# Patient Record
Sex: MALE | Race: WHITE | NOT HISPANIC OR LATINO | Employment: OTHER | URBAN - METROPOLITAN AREA
[De-identification: names, ages, dates, MRNs, and addresses within clinical notes are randomized per-mention and may not be internally consistent; named-entity substitution may affect disease eponyms.]

---

## 2017-08-09 ENCOUNTER — ALLSCRIPTS OFFICE VISIT (OUTPATIENT)
Dept: OTHER | Facility: OTHER | Age: 62
End: 2017-08-09

## 2017-09-26 RX ORDER — ESOMEPRAZOLE MAGNESIUM 40 MG/1
40 CAPSULE, DELAYED RELEASE ORAL
COMMUNITY
End: 2018-05-02 | Stop reason: SDUPTHER

## 2017-09-26 RX ORDER — ATORVASTATIN CALCIUM 40 MG/1
40 TABLET, FILM COATED ORAL DAILY
COMMUNITY
End: 2018-04-26 | Stop reason: SDUPTHER

## 2017-09-26 RX ORDER — ASPIRIN 81 MG/1
81 TABLET ORAL DAILY
COMMUNITY
End: 2018-04-26 | Stop reason: SDUPTHER

## 2017-10-03 ENCOUNTER — ANESTHESIA EVENT (OUTPATIENT)
Dept: PERIOP | Facility: HOSPITAL | Age: 62
End: 2017-10-03
Payer: COMMERCIAL

## 2017-10-03 NOTE — ANESTHESIA PREPROCEDURE EVALUATION
Review of Systems/Medical History  Patient summary reviewed    No history of anesthetic complications     Cardiovascular  Exercise tolerance: good,  Hyperlipidemia,    Pulmonary  Negative pulmonary ROS ,        GI/Hepatic    GERD ,        Negative  ROS        Endo/Other  Negative endo/other ROS      GYN  Negative gynecology ROS          Hematology  Negative hematology ROS      Musculoskeletal  Negative musculoskeletal ROS        Neurology  Negative neurology ROS      Psychology   Negative psychology ROS        finished prep last night @ 1900    Physical Exam    Airway    Mallampati score: II  TM Distance: >3 FB  Neck ROM: full     Dental   No notable dental hx     Cardiovascular  Cardiovascular exam normal    Pulmonary  Pulmonary exam normal     Other Findings        Anesthesia Plan  ASA Score- 2       Anesthesia Type- IV sedation with anesthesia        Induction- intravenous      Informed Consent  Anesthetic plan and risks discussed with patient

## 2017-10-04 ENCOUNTER — GENERIC CONVERSION - ENCOUNTER (OUTPATIENT)
Dept: OTHER | Facility: OTHER | Age: 62
End: 2017-10-04

## 2017-10-04 ENCOUNTER — HOSPITAL ENCOUNTER (OUTPATIENT)
Facility: HOSPITAL | Age: 62
Setting detail: OUTPATIENT SURGERY
Discharge: HOME | End: 2017-10-04
Attending: INTERNAL MEDICINE | Admitting: INTERNAL MEDICINE
Payer: COMMERCIAL

## 2017-10-04 ENCOUNTER — ANESTHESIA (OUTPATIENT)
Dept: PERIOP | Facility: HOSPITAL | Age: 62
End: 2017-10-04
Payer: COMMERCIAL

## 2017-10-04 VITALS
TEMPERATURE: 97.8 F | BODY MASS INDEX: 30.49 KG/M2 | WEIGHT: 212.96 LBS | SYSTOLIC BLOOD PRESSURE: 139 MMHG | HEART RATE: 77 BPM | RESPIRATION RATE: 20 BRPM | OXYGEN SATURATION: 96 % | HEIGHT: 70 IN | DIASTOLIC BLOOD PRESSURE: 63 MMHG

## 2017-10-04 DIAGNOSIS — Z12.11 ENCOUNTER FOR SCREENING FOR MALIGNANT NEOPLASM OF COLON: ICD-10-CM

## 2017-10-04 PROCEDURE — 88305 TISSUE EXAM BY PATHOLOGIST: CPT | Performed by: INTERNAL MEDICINE

## 2017-10-04 RX ORDER — PROPOFOL 10 MG/ML
INJECTION, EMULSION INTRAVENOUS AS NEEDED
Status: DISCONTINUED | OUTPATIENT
Start: 2017-10-04 | End: 2017-10-04 | Stop reason: SURG

## 2017-10-04 RX ORDER — SODIUM CHLORIDE, SODIUM LACTATE, POTASSIUM CHLORIDE, CALCIUM CHLORIDE 600; 310; 30; 20 MG/100ML; MG/100ML; MG/100ML; MG/100ML
125 INJECTION, SOLUTION INTRAVENOUS CONTINUOUS
Status: DISCONTINUED | OUTPATIENT
Start: 2017-10-04 | End: 2017-10-04 | Stop reason: HOSPADM

## 2017-10-04 RX ADMIN — SODIUM CHLORIDE, POTASSIUM CHLORIDE, SODIUM LACTATE AND CALCIUM CHLORIDE: 600; 310; 30; 20 INJECTION, SOLUTION INTRAVENOUS at 08:10

## 2017-10-04 RX ADMIN — PROPOFOL 30 MG: 10 INJECTION, EMULSION INTRAVENOUS at 08:20

## 2017-10-04 RX ADMIN — SODIUM CHLORIDE, POTASSIUM CHLORIDE, SODIUM LACTATE AND CALCIUM CHLORIDE 125 ML/HR: 600; 310; 30; 20 INJECTION, SOLUTION INTRAVENOUS at 07:23

## 2017-10-04 RX ADMIN — PROPOFOL 140 MG: 10 INJECTION, EMULSION INTRAVENOUS at 08:15

## 2017-10-04 NOTE — OP NOTE
**** GI/ENDOSCOPY REPORT ****     PATIENT NAME: Micky Quick ------ VISIT ID:  Patient ID:   [id number] YOB: 1955     INTRODUCTION: Colonoscopy - A 63 male patient presents for an outpatient   Colonoscopy at [facility]  PREVIOUS COLONOSCOPY: 7 yrs     INDICATIONS: Surveillance  Screening for family history of colorectal   cancer, including the patient's mother (diagnosed at age 61)  CONSENT:  The benefits, risks, and alternatives to the procedure were   discussed and informed consent was obtained from the patient  PREPARATION: EKG, pulse, pulse oximetry and blood pressure were monitored   throughout the procedure  The patient was identified by myself both   verbally and by visual inspection of ID band  Airway Assessment   Classification: Airway class 2 - Visualization of the soft palate, fauces   and uvula  ASA Classification: Class 2 - Patient has mild to moderate   systemic disturbance that may or may not be related to the disorder   requiring surgery  MEDICATIONS: Anesthesia-check records     PROCEDURE:  The endoscope was passed without difficulty through the anus   under direct visualization and advanced to the cecum, confirmed by   appendiceal orifice and ileocecal valve  The scope was withdrawn and the   mucosa was carefully examined  The quality of the preparation was   excellent  Cecal Intubation Time: 2 minutes(s) Scope Withdrawal Time: 7   minutes(s)     RECTAL EXAM: Normal rectal exam      FINDINGS:  A single polyp, measuring less than 5 mm in size, was found in   the hepatic flexure  The polyp was completely removed by hot biopsy   polypectomy  The polyp was retrieved  A single diminutive polyp was found   in the rectosigmoid junction  The polyp was completely removed by hot   biopsy polypectomy  The polyp was retrieved  Otherwise, the colon appeared   to be normal      COMPLICATIONS: There were no complications       IMPRESSIONS: A single polyp found in the hepatic flexure; removed by hot   biopsy polypectomy  A single diminutive polyp found in the rectosigmoid   junction; removed by hot biopsy polypectomy  RECOMMENDATIONS: Follow-up on the results of the biopsy specimens  Colonoscopy recommended in 5 years  ESTIMATED BLOOD LOSS: None  PATHOLOGY SPECIMENS: Single polyp completely removed by hot biopsy   polypectomy   Single polyp completely removed by hot biopsy polypectomy   PROCEDURE CODES: : Colonoscopy with removal of tumor(s), polyp(s), or   other lesion(s) by hot biopsy forceps or bipolar cautery     ICD-9 Codes: V16 0 Family history of malignant neoplasm of   gastrointestinal tract 211 3 Benign neoplasm of colon 211 3 Benign   neoplasm of colon     ICD-10 Codes: Z80 0 Family history of malignant neoplasm of digestive   organs K63 5 Polyp of colon K63 5 Polyp of colon     PERFORMED BY: Dr Katie Castleman, M D  [facility]  on 10/04/2017  Version 1, electronically signed by MELINDA Prieto , D O  on   10/04/2017 at 08:38

## 2017-10-04 NOTE — ANESTHESIA POSTPROCEDURE EVALUATION
Post-Op Assessment Note      CV Status:  Stable    Mental Status:  Alert and awake    Hydration Status:  Euvolemic    PONV Controlled:  Controlled    Airway Patency:  Patent    Post Op Vitals Reviewed: Yes          Staff: CRNA           /62 (10/04/17 0828)    Temp      Pulse 69 (10/04/17 0828)   Resp 18 (10/04/17 0828)    SpO2 100 % (10/04/17 0828)

## 2018-01-15 VITALS
SYSTOLIC BLOOD PRESSURE: 118 MMHG | HEART RATE: 76 BPM | BODY MASS INDEX: 31.51 KG/M2 | DIASTOLIC BLOOD PRESSURE: 88 MMHG | HEIGHT: 70 IN | WEIGHT: 220.13 LBS

## 2018-01-15 DIAGNOSIS — R73.01 IMPAIRED FASTING GLUCOSE: ICD-10-CM

## 2018-01-15 DIAGNOSIS — Z12.5 ENCOUNTER FOR SCREENING FOR MALIGNANT NEOPLASM OF PROSTATE: ICD-10-CM

## 2018-01-15 DIAGNOSIS — E78.5 HYPERLIPIDEMIA: ICD-10-CM

## 2018-04-26 DIAGNOSIS — I10 ESSENTIAL HYPERTENSION: Primary | ICD-10-CM

## 2018-04-26 DIAGNOSIS — E78.5 HYPERLIPIDEMIA, UNSPECIFIED HYPERLIPIDEMIA TYPE: Primary | ICD-10-CM

## 2018-04-26 RX ORDER — ATORVASTATIN CALCIUM 40 MG/1
TABLET, FILM COATED ORAL
Qty: 90 TABLET | Refills: 0 | Status: SHIPPED | OUTPATIENT
Start: 2018-04-26 | End: 2018-05-02 | Stop reason: SDUPTHER

## 2018-04-26 RX ORDER — ASPIRIN 81 MG/1
TABLET ORAL
Qty: 90 TABLET | Refills: 2 | Status: SHIPPED | OUTPATIENT
Start: 2018-04-26 | End: 2019-04-29 | Stop reason: SDUPTHER

## 2018-05-02 ENCOUNTER — OFFICE VISIT (OUTPATIENT)
Dept: FAMILY MEDICINE CLINIC | Facility: CLINIC | Age: 63
End: 2018-05-02
Payer: COMMERCIAL

## 2018-05-02 VITALS
BODY MASS INDEX: 31.41 KG/M2 | TEMPERATURE: 96.8 F | OXYGEN SATURATION: 96 % | HEART RATE: 82 BPM | SYSTOLIC BLOOD PRESSURE: 136 MMHG | DIASTOLIC BLOOD PRESSURE: 78 MMHG | HEIGHT: 70 IN | WEIGHT: 219.4 LBS

## 2018-05-02 DIAGNOSIS — Z13.1 DIABETES MELLITUS SCREENING: ICD-10-CM

## 2018-05-02 DIAGNOSIS — R03.0 PREHYPERTENSION: ICD-10-CM

## 2018-05-02 DIAGNOSIS — Z12.5 PROSTATE CANCER SCREENING: ICD-10-CM

## 2018-05-02 DIAGNOSIS — K21.9 GASTROESOPHAGEAL REFLUX DISEASE WITHOUT ESOPHAGITIS: Primary | ICD-10-CM

## 2018-05-02 DIAGNOSIS — Z11.59 NEED FOR HEPATITIS C SCREENING TEST: ICD-10-CM

## 2018-05-02 DIAGNOSIS — E78.5 HYPERLIPIDEMIA, UNSPECIFIED HYPERLIPIDEMIA TYPE: ICD-10-CM

## 2018-05-02 PROCEDURE — 99203 OFFICE O/P NEW LOW 30 MIN: CPT | Performed by: FAMILY MEDICINE

## 2018-05-02 RX ORDER — ESOMEPRAZOLE MAGNESIUM 40 MG/1
40 CAPSULE, DELAYED RELEASE ORAL DAILY
Qty: 90 CAPSULE | Refills: 1 | Status: SHIPPED | OUTPATIENT
Start: 2018-05-02 | End: 2018-05-15 | Stop reason: ALTCHOICE

## 2018-05-02 RX ORDER — ATORVASTATIN CALCIUM 40 MG/1
40 TABLET, FILM COATED ORAL DAILY
Qty: 90 TABLET | Refills: 1 | Status: SHIPPED | OUTPATIENT
Start: 2018-05-02 | End: 2019-01-28 | Stop reason: SDUPTHER

## 2018-05-02 NOTE — PROGRESS NOTES
Assessment/Plan:    No problem-specific Assessment & Plan notes found for this encounter  Diagnoses and all orders for this visit:    Gastroesophageal reflux disease without esophagitis  Stable on chronic medications counseled in regards to side effects of prolong use of PPI  -     esomeprazole (NexIUM) 40 MG capsule; Take 1 capsule (40 mg total) by mouth daily    Hyperlipidemia, unspecified hyperlipidemia type  Stable continue Lipitor   -     atorvastatin (LIPITOR) 40 mg tablet; Take 1 tablet (40 mg total) by mouth daily  -     Lipid panel; Future    Prehypertension  He was advised to keep a low salt diet and also increase diet and exercise and lose weight to maintain normotension  Diabetes mellitus screening  -     Comprehensive metabolic panel; Future  -     HEMOGLOBIN A1C W/ EAG ESTIMATION; Future    Prostate cancer screening  -     PSA Total, Diagnostic; Future      Follow up in 6 months to 1 year or as needed    Subjective:      Patient ID: Halie Trammell is a 66 y o  male  GERD  Paitent complains of heartburn  This has been associated with heartburn  He denies abdominal bloating, chest pain, choking on food, cough, difficulty swallowing and dysphagia  Symptoms have been present for several years  He denies dysphagia  He has not lost weight  He denies melena, hematochezia, hematemesis, and coffee ground emesis  Medical therapy in the past has included proton pump inhibitors  Pre-Hypertension  Patient is here for evaluation of elevated blood pressures  Cardiac symptoms: none  Patient denies chest pain and chest pressure/discomfort  Cardiovascular risk factors: advanced age (older than 54 for men, 72 for women), male gender and obesity (BMI >= 30 kg/m2)  Use of agents associated with hypertension: none  History of target organ damage: none  Hyperlipidemia  Halie Trammell is a 66 y o  male who presents for evaluation of dyslipidemia  A repeat fasting lipid profile was not done   The patient does not use medications that may worsen dyslipidemias (corticosteroids, progestins, anabolic steroids, diuretics, beta-blockers, amiodarone, cyclosporine, olanzapine)  The following portions of the patient's history were reviewed and updated as appropriate:   He  has a past medical history of GERD (gastroesophageal reflux disease); Hyperlipidemia; and Seasonal allergies  He   Patient Active Problem List    Diagnosis Date Noted    Gastroesophageal reflux disease without esophagitis 05/02/2018    Hyperlipidemia 05/02/2018    Prehypertension 05/02/2018    Diabetes mellitus screening 05/02/2018    Prostate cancer screening 05/02/2018     He  has a past surgical history that includes Cholecystectomy; Sinus surgery; EAR SURGERY; Colonoscopy (N/A, 10/4/2017); and Cochlear implant  His family history includes Colon cancer in his mother; Heart attack in his father; Hypertension in his brother, father, and sister; Other in his mother  He  reports that he quit smoking about 43 years ago  He has never used smokeless tobacco  He reports that he drinks about 1 8 oz of alcohol per week   He reports that he does not use drugs  Current Outpatient Prescriptions   Medication Sig Dispense Refill    aspirin (ECOTRIN LOW STRENGTH) 81 mg EC tablet TAKE 1 TABLET DAILY 90 tablet 2    atorvastatin (LIPITOR) 40 mg tablet Take 1 tablet (40 mg total) by mouth daily 90 tablet 1    esomeprazole (NexIUM) 40 MG capsule Take 1 capsule (40 mg total) by mouth daily 90 capsule 1     No current facility-administered medications for this visit        Current Outpatient Prescriptions on File Prior to Visit   Medication Sig    aspirin (ECOTRIN LOW STRENGTH) 81 mg EC tablet TAKE 1 TABLET DAILY    [DISCONTINUED] atorvastatin (LIPITOR) 40 mg tablet TAKE 1 TABLET DAILY    [DISCONTINUED] esomeprazole (NexIUM) 40 MG capsule Take 40 mg by mouth every morning before breakfast     No current facility-administered medications on file prior to visit  He has No Known Allergies       Review of Systems   Constitutional: Negative for activity change, appetite change, fatigue and fever  HENT: Negative for congestion and ear discharge  Respiratory: Negative for cough and shortness of breath  Cardiovascular: Negative for chest pain and palpitations  Gastrointestinal: Negative for diarrhea and nausea  Musculoskeletal: Negative for arthralgias and back pain  Skin: Negative for color change and rash  Neurological: Negative for dizziness and headaches  Psychiatric/Behavioral: Negative for agitation and behavioral problems  Objective:      /78   Pulse 82   Temp (!) 96 8 °F (36 °C)   Ht 5' 10" (1 778 m)   Wt 99 5 kg (219 lb 6 4 oz)   SpO2 96%   BMI 31 48 kg/m²          Physical Exam   Constitutional: He is oriented to person, place, and time  He appears well-developed and well-nourished  No distress  Eyes: Pupils are equal, round, and reactive to light  No scleral icterus  Cardiovascular: Normal rate, regular rhythm and normal heart sounds  No murmur heard  Pulmonary/Chest: Effort normal and breath sounds normal  No respiratory distress  He has no wheezes  Abdominal: Soft  Bowel sounds are normal  He exhibits no distension  There is no tenderness  Neurological: He is alert and oriented to person, place, and time  Skin: Skin is warm and dry  No rash noted  He is not diaphoretic

## 2018-05-09 LAB
ALBUMIN SERPL-MCNC: 4.4 G/DL (ref 3.6–4.8)
ALBUMIN/GLOB SERPL: 2.1 {RATIO} (ref 1.2–2.2)
ALP SERPL-CCNC: 75 IU/L (ref 39–117)
ALT SERPL-CCNC: 40 IU/L (ref 0–44)
AMBIG ABBREV DEFAULT: NORMAL
AMBIG ABBREV DEFAULT: NORMAL
AST SERPL-CCNC: 26 IU/L (ref 0–40)
BILIRUB SERPL-MCNC: 0.7 MG/DL (ref 0–1.2)
BUN SERPL-MCNC: 15 MG/DL (ref 8–27)
BUN/CREAT SERPL: 13 (ref 10–24)
CALCIUM SERPL-MCNC: 9.4 MG/DL (ref 8.6–10.2)
CHLORIDE SERPL-SCNC: 104 MMOL/L (ref 96–106)
CHOLEST SERPL-MCNC: 148 MG/DL (ref 100–199)
CO2 SERPL-SCNC: 25 MMOL/L (ref 18–29)
CREAT SERPL-MCNC: 1.19 MG/DL (ref 0.76–1.27)
EST. AVERAGE GLUCOSE BLD GHB EST-MCNC: 120 MG/DL
GLOBULIN SER-MCNC: 2.1 G/DL (ref 1.5–4.5)
GLUCOSE SERPL-MCNC: 109 MG/DL (ref 65–99)
HBA1C MFR BLD HPLC: 5.8 %
HBA1C MFR BLD: 5.8 % (ref 4.8–5.6)
HCV AB S/CO SERPL IA: <0.1 S/CO RATIO (ref 0–0.9)
HDLC SERPL-MCNC: 40 MG/DL
LDLC SERPL CALC-MCNC: 89 MG/DL (ref 0–99)
POTASSIUM SERPL-SCNC: 4.8 MMOL/L (ref 3.5–5.2)
PROT SERPL-MCNC: 6.5 G/DL (ref 6–8.5)
PSA SERPL-MCNC: 2.1 NG/ML (ref 0–4)
SL AMB COMMENTS: NORMAL
SL AMB EGFR AFRICAN AMERICAN: 76 ML/MIN/1.73
SL AMB EGFR NON AFRICAN AMERICAN: 66 ML/MIN/1.73
SL AMB VLDL CHOLESTEROL CALC: 19 MG/DL (ref 5–40)
SODIUM SERPL-SCNC: 145 MMOL/L (ref 134–144)
TRIGL SERPL-MCNC: 93 MG/DL (ref 0–149)

## 2018-05-10 ENCOUNTER — OFFICE VISIT (OUTPATIENT)
Dept: FAMILY MEDICINE CLINIC | Facility: CLINIC | Age: 63
End: 2018-05-10
Payer: COMMERCIAL

## 2018-05-10 VITALS
BODY MASS INDEX: 31.12 KG/M2 | DIASTOLIC BLOOD PRESSURE: 76 MMHG | OXYGEN SATURATION: 96 % | SYSTOLIC BLOOD PRESSURE: 134 MMHG | TEMPERATURE: 97.9 F | HEIGHT: 70 IN | HEART RATE: 68 BPM | RESPIRATION RATE: 16 BRPM | WEIGHT: 217.4 LBS

## 2018-05-10 DIAGNOSIS — R10.13 EPIGASTRIC PAIN: Primary | ICD-10-CM

## 2018-05-10 PROCEDURE — 99214 OFFICE O/P EST MOD 30 MIN: CPT | Performed by: FAMILY MEDICINE

## 2018-05-10 RX ORDER — RANITIDINE 150 MG/1
150 CAPSULE ORAL 2 TIMES DAILY
Qty: 28 CAPSULE | Refills: 1 | Status: SHIPPED | OUTPATIENT
Start: 2018-05-10 | End: 2018-05-15 | Stop reason: ALTCHOICE

## 2018-05-10 NOTE — PROGRESS NOTES
Assessment/Plan:    No problem-specific Assessment & Plan notes found for this encounter  Diagnoses and all orders for this visit:    Epigastric pain  Unclear etiology, given his distant history of Ulcer and after discussing risks and benefits of medication along with side effects will start Zantac at this time to be taken along Nexium  Blood work and stool studies ordered  Also U/S of abdomen  If symptoms worsen of severe pain, hematemesis develops advised to go to the ER immediately  -     Lipase; Future  -     Amylase; Future  -     H  pylori antigen, stool; Future  -     US abdomen complete; Future  -     Comprehensive metabolic panel; Future  -     CBC and differential; Future  -     ranitidine (ZANTAC) 150 MG capsule; Take 1 capsule (150 mg total) by mouth 2 (two) times a day for 14 days      Follow up in 1 week or as needed    Subjective:      Patient ID: Karthikeyan Saeed is a 66 y o  male  Abdominal Pain  Patient complains of abdominal pain  The pain is described as sharp, shooting and stabbing, and is 6/10 in intensity  The patient is experiencing epigastric pain without radiation  Onset was 3 days ago  Symptoms have been gradually worsening  Aggravating factors: eating  Alleviating factors: none  Associated symptoms: nausea  The patient denies anorexia, arthralgias, belching, chills, diarrhea, dysuria, fever, hematochezia, hematuria and melena  He has a distant history of duodenal ulcers in the past as well as acid reflux disease and takes Nexium daily for his symptoms  The following portions of the patient's history were reviewed and updated as appropriate:   He  has a past medical history of GERD (gastroesophageal reflux disease); Hyperlipidemia; and Seasonal allergies    He   Patient Active Problem List    Diagnosis Date Noted    Epigastric pain 05/10/2018    Gastroesophageal reflux disease without esophagitis 05/02/2018    Hyperlipidemia 05/02/2018    Prehypertension 05/02/2018  Diabetes mellitus screening 05/02/2018    Prostate cancer screening 05/02/2018     He  has a past surgical history that includes Cholecystectomy; Sinus surgery; EAR SURGERY; Colonoscopy (N/A, 10/4/2017); and Cochlear implant  His family history includes Colon cancer in his mother; Heart attack in his father; Hypertension in his brother, father, and sister; Other in his mother  He  reports that he quit smoking about 43 years ago  He has never used smokeless tobacco  He reports that he drinks about 1 8 oz of alcohol per week   He reports that he does not use drugs  Current Outpatient Prescriptions   Medication Sig Dispense Refill    aspirin (ECOTRIN LOW STRENGTH) 81 mg EC tablet TAKE 1 TABLET DAILY 90 tablet 2    atorvastatin (LIPITOR) 40 mg tablet Take 1 tablet (40 mg total) by mouth daily 90 tablet 1    esomeprazole (NexIUM) 40 MG capsule Take 1 capsule (40 mg total) by mouth daily 90 capsule 1    ranitidine (ZANTAC) 150 MG capsule Take 1 capsule (150 mg total) by mouth 2 (two) times a day for 14 days 28 capsule 1     No current facility-administered medications for this visit  Current Outpatient Prescriptions on File Prior to Visit   Medication Sig    aspirin (ECOTRIN LOW STRENGTH) 81 mg EC tablet TAKE 1 TABLET DAILY    atorvastatin (LIPITOR) 40 mg tablet Take 1 tablet (40 mg total) by mouth daily    esomeprazole (NexIUM) 40 MG capsule Take 1 capsule (40 mg total) by mouth daily     No current facility-administered medications on file prior to visit  He has No Known Allergies       Review of Systems   Constitutional: Negative for activity change, appetite change, fatigue and fever  HENT: Negative for congestion and ear discharge  Respiratory: Negative for cough and shortness of breath  Cardiovascular: Negative for chest pain and palpitations  Gastrointestinal: Positive for abdominal pain and nausea   Negative for abdominal distention, blood in stool, constipation, diarrhea and vomiting  Musculoskeletal: Negative for arthralgias and back pain  Skin: Negative for color change and rash  Neurological: Negative for dizziness and headaches  Psychiatric/Behavioral: Negative for agitation and behavioral problems  Objective:      /76 (BP Location: Left arm, Patient Position: Sitting, Cuff Size: Standard)   Pulse 68   Temp 97 9 °F (36 6 °C) (Tympanic)   Resp 16   Ht 5' 10" (1 778 m)   Wt 98 6 kg (217 lb 6 4 oz)   SpO2 96%   BMI 31 19 kg/m²          Physical Exam   Constitutional: He is oriented to person, place, and time  He appears well-developed and well-nourished  No distress  Eyes: Pupils are equal, round, and reactive to light  No scleral icterus  Cardiovascular: Normal rate, regular rhythm and normal heart sounds  No murmur heard  Pulmonary/Chest: Effort normal and breath sounds normal  No respiratory distress  He has no wheezes  Abdominal: Soft  Bowel sounds are normal  He exhibits no distension  There is tenderness  Neurological: He is alert and oriented to person, place, and time  Skin: Skin is warm and dry  No rash noted  He is not diaphoretic  Psychiatric: He has a normal mood and affect

## 2018-05-11 ENCOUNTER — HOSPITAL ENCOUNTER (OUTPATIENT)
Dept: RADIOLOGY | Age: 63
Discharge: HOME | End: 2018-05-11
Payer: COMMERCIAL

## 2018-05-11 DIAGNOSIS — R10.13 EPIGASTRIC PAIN: ICD-10-CM

## 2018-05-11 PROCEDURE — 76700 US EXAM ABDOM COMPLETE: CPT

## 2018-05-15 ENCOUNTER — OFFICE VISIT (OUTPATIENT)
Dept: GASTROENTEROLOGY | Facility: CLINIC | Age: 63
End: 2018-05-15
Payer: COMMERCIAL

## 2018-05-15 VITALS
HEIGHT: 70 IN | HEART RATE: 84 BPM | WEIGHT: 212 LBS | BODY MASS INDEX: 30.35 KG/M2 | DIASTOLIC BLOOD PRESSURE: 70 MMHG | SYSTOLIC BLOOD PRESSURE: 128 MMHG

## 2018-05-15 DIAGNOSIS — R10.30 LOWER ABDOMINAL PAIN: Primary | ICD-10-CM

## 2018-05-15 DIAGNOSIS — R10.13 DYSPEPSIA: ICD-10-CM

## 2018-05-15 DIAGNOSIS — R10.13 EPIGASTRIC PAIN: Primary | ICD-10-CM

## 2018-05-15 DIAGNOSIS — R17 SERUM TOTAL BILIRUBIN ELEVATED: ICD-10-CM

## 2018-05-15 LAB
ALBUMIN SERPL-MCNC: 4.8 G/DL (ref 3.6–4.8)
ALBUMIN/GLOB SERPL: 2.1 {RATIO} (ref 1.2–2.2)
ALP SERPL-CCNC: 89 IU/L (ref 39–117)
ALT SERPL-CCNC: 37 IU/L (ref 0–44)
AMBIG ABBREV DEFAULT: NORMAL
AMYLASE SERPL-CCNC: 64 U/L (ref 31–124)
AST SERPL-CCNC: 24 IU/L (ref 0–40)
BASOPHILS # BLD AUTO: 0 X10E3/UL (ref 0–0.2)
BASOPHILS NFR BLD AUTO: 0 %
BILIRUB SERPL-MCNC: 1.3 MG/DL (ref 0–1.2)
BUN SERPL-MCNC: 13 MG/DL (ref 8–27)
BUN/CREAT SERPL: 12 (ref 10–24)
CALCIUM SERPL-MCNC: 9.3 MG/DL (ref 8.6–10.2)
CHLORIDE SERPL-SCNC: 97 MMOL/L (ref 96–106)
CO2 SERPL-SCNC: 26 MMOL/L (ref 18–29)
CREAT SERPL-MCNC: 1.13 MG/DL (ref 0.76–1.27)
EOSINOPHIL # BLD AUTO: 0.4 X10E3/UL (ref 0–0.4)
EOSINOPHIL NFR BLD AUTO: 3 %
ERYTHROCYTE [DISTWIDTH] IN BLOOD BY AUTOMATED COUNT: 13.8 % (ref 12.3–15.4)
GLOBULIN SER-MCNC: 2.3 G/DL (ref 1.5–4.5)
GLUCOSE SERPL-MCNC: 116 MG/DL (ref 65–99)
H PYLORI AG STL QL IA: NEGATIVE
HCT VFR BLD AUTO: 51.8 % (ref 37.5–51)
HGB BLD-MCNC: 17.3 G/DL (ref 13–17.7)
IMM GRANULOCYTES # BLD: 0 X10E3/UL (ref 0–0.1)
IMM GRANULOCYTES NFR BLD: 0 %
LIPASE SERPL-CCNC: 35 U/L (ref 13–78)
LYMPHOCYTES # BLD AUTO: 2.5 X10E3/UL (ref 0.7–3.1)
LYMPHOCYTES NFR BLD AUTO: 23 %
MCH RBC QN AUTO: 30 PG (ref 26.6–33)
MCHC RBC AUTO-ENTMCNC: 33.4 G/DL (ref 31.5–35.7)
MCV RBC AUTO: 90 FL (ref 79–97)
MONOCYTES # BLD AUTO: 1.2 X10E3/UL (ref 0.1–0.9)
MONOCYTES NFR BLD AUTO: 11 %
NEUTROPHILS # BLD AUTO: 6.7 X10E3/UL (ref 1.4–7)
NEUTROPHILS NFR BLD AUTO: 63 %
PLATELET # BLD AUTO: 277 X10E3/UL (ref 150–379)
POTASSIUM SERPL-SCNC: 4.1 MMOL/L (ref 3.5–5.2)
PROT SERPL-MCNC: 7.1 G/DL (ref 6–8.5)
RBC # BLD AUTO: 5.76 X10E6/UL (ref 4.14–5.8)
SL AMB EGFR AFRICAN AMERICAN: 81 ML/MIN/1.73
SL AMB EGFR NON AFRICAN AMERICAN: 70 ML/MIN/1.73
SODIUM SERPL-SCNC: 139 MMOL/L (ref 134–144)
WBC # BLD AUTO: 10.9 X10E3/UL (ref 3.4–10.8)

## 2018-05-15 PROCEDURE — 99214 OFFICE O/P EST MOD 30 MIN: CPT | Performed by: NURSE PRACTITIONER

## 2018-05-15 RX ORDER — DEXLANSOPRAZOLE 60 MG/1
60 CAPSULE, DELAYED RELEASE ORAL DAILY
Qty: 39 CAPSULE | Refills: 6 | Status: SHIPPED | OUTPATIENT
Start: 2018-05-15 | End: 2018-05-31 | Stop reason: SDUPTHER

## 2018-05-15 RX ORDER — DICYCLOMINE HCL 20 MG
20 TABLET ORAL EVERY 6 HOURS
Qty: 30 TABLET | Refills: 0 | Status: SHIPPED | OUTPATIENT
Start: 2018-05-15 | End: 2019-04-15 | Stop reason: ALTCHOICE

## 2018-05-15 RX ORDER — FAMOTIDINE 40 MG/1
40 TABLET, FILM COATED ORAL DAILY
Qty: 30 TABLET | Refills: 6 | Status: SHIPPED | OUTPATIENT
Start: 2018-05-15 | End: 2018-05-31 | Stop reason: SDUPTHER

## 2018-05-15 NOTE — PATIENT INSTRUCTIONS
Patient with distant history of peptic ulcer and has been on  PPI therapy for 20 + years    He has tried omeprazole and failed, pantoprazole and failed and esomeprazole and failed  He has failed ranitidine and has not trialed pepcid  Elevated bilirubin and dyspepsia- Recent ultrasound showed a gallbladder which he had removed many years ago and so we will repeat  Continue probiotic  He leaves for family vacation for 1 week to [place] on Saturday and will obtain EGD afterwards  GERD diet instructions given

## 2018-05-15 NOTE — PROGRESS NOTES
Assessment/Plan:    Patient with distant history of peptic ulcer and has been on  PPI therapy for 20 + years  He has tried omeprazole and failed, pantoprazole and failed and esomeprazole and failed  He has failed ranitidine and has not trialed pepcid    Elevated bilirubin and dyspepsia- Recent ultrasound showed a gallbladder which he had removed many years ago and so we will repeat ultrasound    Continue probiotic    He leaves for family vacation for 1 week to [place] on Saturday and will obtain EGD afterwards    GERD diet instructions given           Problem List Items Addressed This Visit     Epigastric pain - Primary    Relevant Medications    dexlansoprazole (DEXILANT) 60 MG capsule    famotidine (PEPCID) 40 MG tablet    aluminum hydroxide-magnesium carbonate (GAVISCON)  mg/15 mL oral suspension    Other Relevant Orders    US abdomen complete    Dyspepsia    Relevant Medications    dexlansoprazole (DEXILANT) 60 MG capsule    famotidine (PEPCID) 40 MG tablet    aluminum hydroxide-magnesium carbonate (GAVISCON)  mg/15 mL oral suspension    Other Relevant Orders    US abdomen complete    Serum total bilirubin elevated    Relevant Orders    US abdomen complete            Subjective:      Patient ID: Jaun Delgado is a 66 y o  male  68-year-old male With past medical history of peptic ulcer disease diagnosed when patient was 15, chronic GERD and PPI usage for the past 25 years  He has been on omeprazole, pantoprazole, ranitidine  He has never been on Dexilant  2 weeks patient ago was doing intensive Labor outside during a warm day until he came in for dinner  His dinner consisted of stuffed shells, sausage, meatballs, bread and butter  He then developed epigastric pain, nausea, heartburn, abdominal bloating  He vomited during that time but not coffee-ground  He had taken over-the-counter antacids and Pepto-Bismol without relief   Patient then developed this pain postprandially over the next few days although not as bad as the initial pain and saw his PCP who started him on ranitidine and did an abdominal ultrasound  The ultrasound showed a normal gallbladder but patient had gallbladder removed a few years ago for stones  He also had bloodwork which showed a Mildly elevated WBC of 10 9 with a normal H&H,  and a bilirubin total of 1 3 with normal AST and AST  His amylase and lipase were normal    The patient states that he has a lingering right upper quadrant/epigastric discomfort minutes worsens with food to the point where he has lost 8 pounds in the past 2 weeks  He continues to have some mild nausea throughout the day and in the morning, heartburn and epigastric bloating  He has no diarrhea, constipation, melena, hematochezia  His last colonoscopy was in October 2017 and he had a small benign polyp  He goes to [place] on Saturday for a one-week vacation  He will get an EGD when he comes back and I will repeat the ultrasound before he goes        The following portions of the patient's history were reviewed and updated as appropriate:   He  has a past medical history of GERD (gastroesophageal reflux disease); Hyperlipidemia; and Seasonal allergies  He   Patient Active Problem List    Diagnosis Date Noted    Dyspepsia 05/15/2018    Serum total bilirubin elevated 05/15/2018    Epigastric pain 05/10/2018    Gastroesophageal reflux disease without esophagitis 05/02/2018    Hyperlipidemia 05/02/2018    Prehypertension 05/02/2018    Diabetes mellitus screening 05/02/2018    Prostate cancer screening 05/02/2018     He  has a past surgical history that includes Cholecystectomy; Sinus surgery; EAR SURGERY; Colonoscopy (N/A, 10/4/2017); and Cochlear implant  His family history includes Colon cancer in his mother; Heart attack in his father; Hypertension in his brother, father, and sister; Other in his mother  He  reports that he quit smoking about 43 years ago   He has never used smokeless tobacco  He reports that he drinks about 1 8 oz of alcohol per week   He reports that he does not use drugs  Current Outpatient Prescriptions   Medication Sig Dispense Refill    aspirin (ECOTRIN LOW STRENGTH) 81 mg EC tablet TAKE 1 TABLET DAILY 90 tablet 2    atorvastatin (LIPITOR) 40 mg tablet Take 1 tablet (40 mg total) by mouth daily 90 tablet 1    aluminum hydroxide-magnesium carbonate (GAVISCON)  mg/15 mL oral suspension Take 15 mL by mouth 4 (four) times daily (after meals and at bedtime) 1 Bottle 0    dexlansoprazole (DEXILANT) 60 MG capsule Take 1 capsule (60 mg total) by mouth daily 39 capsule 6    famotidine (PEPCID) 40 MG tablet Take 1 tablet (40 mg total) by mouth daily for 30 days 30 tablet 6     No current facility-administered medications for this visit  Current Outpatient Prescriptions on File Prior to Visit   Medication Sig    aspirin (ECOTRIN LOW STRENGTH) 81 mg EC tablet TAKE 1 TABLET DAILY    atorvastatin (LIPITOR) 40 mg tablet Take 1 tablet (40 mg total) by mouth daily    [DISCONTINUED] esomeprazole (NexIUM) 40 MG capsule Take 1 capsule (40 mg total) by mouth daily    [DISCONTINUED] ranitidine (ZANTAC) 150 MG capsule Take 1 capsule (150 mg total) by mouth 2 (two) times a day for 14 days     No current facility-administered medications on file prior to visit  He has No Known Allergies       Review of Systems   Constitutional: Positive for appetite change and unexpected weight change  Negative for activity change, chills, diaphoresis, fatigue and fever  HENT: Negative for trouble swallowing and voice change  Respiratory: Negative  Cardiovascular: Negative  Gastrointestinal: Positive for abdominal distention, abdominal pain and nausea  Negative for anal bleeding, blood in stool, constipation, diarrhea, rectal pain and vomiting  Musculoskeletal: Negative  Skin: Positive for rash           Objective:      /70   Pulse 84   Ht 5' 10" (1 778 m)   Wt 96 2 kg (212 lb)   BMI 30 42 kg/m²          Physical Exam   Constitutional: He appears well-developed and well-nourished  Eyes: No scleral icterus  Abdominal: He exhibits distension  He exhibits no mass  There is tenderness  There is no rebound and no guarding  Gallbladder surgical scar on abdomen   Lymphadenopathy:     He has no cervical adenopathy  Skin: Skin is warm and dry

## 2018-05-18 ENCOUNTER — TELEPHONE (OUTPATIENT)
Dept: GASTROENTEROLOGY | Facility: CLINIC | Age: 63
End: 2018-05-18

## 2018-05-18 ENCOUNTER — HOSPITAL ENCOUNTER (OUTPATIENT)
Dept: ULTRASOUND IMAGING | Facility: HOSPITAL | Age: 63
Discharge: HOME | End: 2018-05-18

## 2018-05-18 DIAGNOSIS — R10.13 EPIGASTRIC PAIN: ICD-10-CM

## 2018-05-18 DIAGNOSIS — R17 SERUM TOTAL BILIRUBIN ELEVATED: ICD-10-CM

## 2018-05-18 DIAGNOSIS — R10.13 DYSPEPSIA: ICD-10-CM

## 2018-05-30 ENCOUNTER — ANESTHESIA EVENT (OUTPATIENT)
Dept: PERIOP | Facility: HOSPITAL | Age: 63
End: 2018-05-30
Payer: COMMERCIAL

## 2018-05-31 ENCOUNTER — ANESTHESIA (OUTPATIENT)
Dept: PERIOP | Facility: HOSPITAL | Age: 63
End: 2018-05-31
Payer: COMMERCIAL

## 2018-05-31 ENCOUNTER — HOSPITAL ENCOUNTER (OUTPATIENT)
Facility: HOSPITAL | Age: 63
Setting detail: OUTPATIENT SURGERY
Discharge: HOME | End: 2018-05-31
Attending: INTERNAL MEDICINE | Admitting: INTERNAL MEDICINE
Payer: COMMERCIAL

## 2018-05-31 VITALS
DIASTOLIC BLOOD PRESSURE: 73 MMHG | TEMPERATURE: 97.6 F | SYSTOLIC BLOOD PRESSURE: 120 MMHG | WEIGHT: 213 LBS | HEART RATE: 71 BPM | OXYGEN SATURATION: 96 % | BODY MASS INDEX: 30.49 KG/M2 | RESPIRATION RATE: 13 BRPM | HEIGHT: 70 IN

## 2018-05-31 DIAGNOSIS — R10.13 EPIGASTRIC PAIN: ICD-10-CM

## 2018-05-31 DIAGNOSIS — R10.13 DYSPEPSIA: ICD-10-CM

## 2018-05-31 DIAGNOSIS — R10.30 LOWER ABDOMINAL PAIN: ICD-10-CM

## 2018-05-31 PROCEDURE — 88305 TISSUE EXAM BY PATHOLOGIST: CPT | Performed by: PATHOLOGY

## 2018-05-31 PROCEDURE — 43239 EGD BIOPSY SINGLE/MULTIPLE: CPT | Performed by: INTERNAL MEDICINE

## 2018-05-31 PROCEDURE — 88342 IMHCHEM/IMCYTCHM 1ST ANTB: CPT | Performed by: PATHOLOGY

## 2018-05-31 RX ORDER — PROPOFOL 10 MG/ML
INJECTION, EMULSION INTRAVENOUS AS NEEDED
Status: DISCONTINUED | OUTPATIENT
Start: 2018-05-31 | End: 2018-05-31 | Stop reason: SURG

## 2018-05-31 RX ORDER — SODIUM CHLORIDE, SODIUM LACTATE, POTASSIUM CHLORIDE, CALCIUM CHLORIDE 600; 310; 30; 20 MG/100ML; MG/100ML; MG/100ML; MG/100ML
125 INJECTION, SOLUTION INTRAVENOUS CONTINUOUS
Status: DISCONTINUED | OUTPATIENT
Start: 2018-05-31 | End: 2018-05-31 | Stop reason: HOSPADM

## 2018-05-31 RX ORDER — DEXLANSOPRAZOLE 60 MG/1
60 CAPSULE, DELAYED RELEASE ORAL DAILY
Qty: 90 CAPSULE | Refills: 3 | Status: SHIPPED | OUTPATIENT
Start: 2018-05-31 | End: 2019-03-29 | Stop reason: SDUPTHER

## 2018-05-31 RX ORDER — LIDOCAINE HYDROCHLORIDE 10 MG/ML
INJECTION, SOLUTION INFILTRATION; PERINEURAL AS NEEDED
Status: DISCONTINUED | OUTPATIENT
Start: 2018-05-31 | End: 2018-05-31 | Stop reason: SURG

## 2018-05-31 RX ORDER — FAMOTIDINE 40 MG/1
40 TABLET, FILM COATED ORAL DAILY
Qty: 90 TABLET | Refills: 3 | Status: SHIPPED | OUTPATIENT
Start: 2018-05-31 | End: 2019-04-15 | Stop reason: ALTCHOICE

## 2018-05-31 RX ADMIN — LIDOCAINE HYDROCHLORIDE 100 MG: 10 INJECTION, SOLUTION INFILTRATION; PERINEURAL at 09:07

## 2018-05-31 RX ADMIN — PROPOFOL 20 MG: 10 INJECTION, EMULSION INTRAVENOUS at 09:08

## 2018-05-31 RX ADMIN — PROPOFOL 100 MG: 10 INJECTION, EMULSION INTRAVENOUS at 09:07

## 2018-05-31 RX ADMIN — SODIUM CHLORIDE, SODIUM LACTATE, POTASSIUM CHLORIDE, AND CALCIUM CHLORIDE 125 ML/HR: .6; .31; .03; .02 INJECTION, SOLUTION INTRAVENOUS at 08:15

## 2018-05-31 RX ADMIN — PROPOFOL 30 MG: 10 INJECTION, EMULSION INTRAVENOUS at 09:10

## 2018-05-31 NOTE — OP NOTE
**** GI/ENDOSCOPY REPORT ****     PATIENT NAME: Roxanne Lake - VISIT ID:  Patient ID: [id number]   YOB: 1955     INTRODUCTION: Esophagogastroduodenoscopy - A 66 male patient presents for   an outpatient Esophagogastroduodenoscopy at [facility]  INDICATIONS: Pain located in the epigastrium  Dyspepsia  Hx  PUD     CONSENT: The benefits, risks, and alternatives to the procedure were   discussed and informed consent was obtained from the patient  PREPARATION:  EKG, pulse, pulse oximetry and blood pressure were monitored   throughout the procedure  MEDICATIONS:asa 2     PROCEDURE:  The endoscope was passed without difficulty through the mouth   under direct visualization and advanced to the 3rd portion of the   duodenum  The scope was withdrawn and the mucosa was carefully examined  FINDINGS:   Esophagus: The esophagus appeared to be normal  The Z line was   visualized at 41 cm from the entry site  Stomach: Multiple small polyps   was found in the antrum and fundus  A biopsy was taken from the polyps in   the antrum and fundus  The body of the stomach, cardia, incisura, and   pylorus appeared to be normal  A biopsy was taken from the antrum and body   of the stomach  Duodenum: The duodenal bulb, 2nd portion of the duodenum,   and 3rd portion of the duodenum appeared to be normal      COMPLICATIONS: There were no complications  IMPRESSIONS: Normal esophagus  Z line visualized  Polyps found in the   antrum and fundus  Biopsy taken  Normal body of the stomach, cardia,   incisura, and pylorus  Biopsy taken  Normal duodenal bulb, 2nd portion of   the duodenum, and 3rd portion of the duodenum  RECOMMENDATIONS: Follow-up on the results of the biopsy specimens in 1   week  Continue current medications  ESTIMATED BLOOD LOSS: Insignificant  PATHOLOGY SPECIMENS: Biopsy taken from antrum and fundus  Associated   finding: Polyp   Random biopsy taken from the body of the stomach  PROCEDURE CODES: 40313 - EGD flexible; with biopsy     ICD-9 Codes: 789 06 Abdominal pain, epigastric 536 8 Dyspepsia and other   specified disorders of function of stomach 211 1 Benign neoplasm of stomach     ICD-10 Codes: R10 13 Epigastric pain K30 Functional dyspepsia D37 1   Neoplasm of uncertain behavior of stomach     PERFORMED BY: MELINDA Ferrera  on 05/31/2018  Version 1, electronically signed by MELINDA Portillo , D O  on   05/31/2018 at 09:16

## 2018-05-31 NOTE — ANESTHESIA POSTPROCEDURE EVALUATION
Post-Op Assessment Note      CV Status:  Stable    Mental Status:  Lethargic    Hydration Status:  Stable and euvolemic    PONV Controlled:  None    Airway Patency:  Patent    Post Op Vitals Reviewed: Yes          Staff: CRNA       Comments: vss          BP   118/73   Temp      Pulse  74   Resp 18   SpO2 95

## 2018-05-31 NOTE — DISCHARGE INSTRUCTIONS
Upper Endoscopy   WHAT YOU NEED TO KNOW:   An upper endoscopy is also called an upper gastrointestinal (GI) endoscopy, or an esophagogastroduodenoscopy (EGD)  You may feel bloated, gassy, or have some abdominal discomfort after your procedure  Your throat may be sore for 24 to 36 hours  You may burp or pass gas from air that is still inside your body  DISCHARGE INSTRUCTIONS:   Call 911 if:   · You have sudden chest pain or trouble breathing  Seek care immediately if:   · You feel dizzy or faint  · You have trouble swallowing  · You have severe throat pain  · Your bowel movements are very dark or black  · Your abdomen is hard and firm and you have severe pain  · You vomit blood  Contact your healthcare provider if:   · You feel full or bloated and cannot burp or pass gas  · You have not had a bowel movement for 3 days after your procedure  · You have neck pain  · You have a fever or chills  · You have nausea or are vomiting  · You have a rash or hives  · You have questions or concerns about your endoscopy  Relieve a sore throat:  Suck on throat lozenges or crushed ice  Gargle with a small amount of warm salt water  Mix 1 teaspoon of salt and 1 cup of warm water to make salt water  Relieve gas and discomfort from bloating:  Lie on your right side with a heating pad on your abdomen  Take short walks to help pass gas  Eat small meals until bloating is relieved  Rest after your procedure:  Do not drive or make important decisions until the day after your procedure  Return to your normal activity as directed  You can usually return to work the day after your procedure  Follow up with your healthcare provider as directed:  Write down your questions so you remember to ask them during your visits  © 2017 [employer]  Information is for End User's use only and may not be sold, redistributed or otherwise used for commercial purposes   All illustrations and images included in [employer] are the copyrighted property of [employer] , [employer]  or [employer]  The above information is an  only  It is not intended as medical advice for individual conditions or treatments  Talk to your doctor, nurse or pharmacist before following any medical regimen to see if it is safe and effective for you

## 2018-05-31 NOTE — ANESTHESIA PREPROCEDURE EVALUATION
Review of Systems/Medical History  Patient summary reviewed        Cardiovascular  Hyperlipidemia,    Pulmonary  Negative pulmonary ROS        GI/Hepatic    GERD ,        Negative  ROS        Endo/Other  Negative endo/other ROS      GYN  Negative gynecology ROS          Hematology  Negative hematology ROS      Musculoskeletal  Negative musculoskeletal ROS        Neurology  Negative neurology ROS      Psychology   Negative psychology ROS              Physical Exam    Airway    Mallampati score: II  TM Distance: >3 FB  Neck ROM: full     Dental       Cardiovascular  Cardiovascular exam normal    Pulmonary  Pulmonary exam normal     Other Findings        Anesthesia Plan  ASA Score- 2     Anesthesia Type- IV sedation with anesthesia with ASA Monitors  Additional Monitors:   Airway Plan:         Plan Factors-    Induction- intravenous  Postoperative Plan-     Informed Consent- Anesthetic plan and risks discussed with patient  I personally reviewed this patient with the CRNA  Discussed and agreed on the Anesthesia Plan with the CRNA  Jigna Wilburn

## 2018-06-07 ENCOUNTER — TELEPHONE (OUTPATIENT)
Dept: GASTROENTEROLOGY | Facility: CLINIC | Age: 63
End: 2018-06-07

## 2018-09-07 ENCOUNTER — TELEPHONE (OUTPATIENT)
Dept: GASTROENTEROLOGY | Facility: CLINIC | Age: 63
End: 2018-09-07

## 2018-12-04 ENCOUNTER — OFFICE VISIT (OUTPATIENT)
Dept: FAMILY MEDICINE CLINIC | Facility: CLINIC | Age: 63
End: 2018-12-04
Payer: COMMERCIAL

## 2018-12-04 VITALS
HEART RATE: 82 BPM | DIASTOLIC BLOOD PRESSURE: 80 MMHG | OXYGEN SATURATION: 99 % | TEMPERATURE: 98.9 F | SYSTOLIC BLOOD PRESSURE: 138 MMHG | BODY MASS INDEX: 31.92 KG/M2 | WEIGHT: 223 LBS | HEIGHT: 70 IN | RESPIRATION RATE: 18 BRPM

## 2018-12-04 DIAGNOSIS — M54.6 ACUTE RIGHT-SIDED THORACIC BACK PAIN: Primary | ICD-10-CM

## 2018-12-04 PROCEDURE — 3008F BODY MASS INDEX DOCD: CPT | Performed by: FAMILY MEDICINE

## 2018-12-04 PROCEDURE — 99214 OFFICE O/P EST MOD 30 MIN: CPT | Performed by: FAMILY MEDICINE

## 2018-12-04 RX ORDER — TIZANIDINE 4 MG/1
4 TABLET ORAL
Qty: 30 TABLET | Refills: 1 | Status: SHIPPED | OUTPATIENT
Start: 2018-12-04 | End: 2019-04-15 | Stop reason: ALTCHOICE

## 2018-12-04 RX ORDER — IBUPROFEN 600 MG/1
600 TABLET ORAL EVERY 8 HOURS PRN
Qty: 30 TABLET | Refills: 1 | Status: SHIPPED | OUTPATIENT
Start: 2018-12-04 | End: 2019-04-15

## 2018-12-04 NOTE — PROGRESS NOTES
Assessment/Plan:    No problem-specific Assessment & Plan notes found for this encounter  Diagnoses and all orders for this visit:    Acute right-sided thoracic back pain  After discussing risks and benefits of medication along with side effects  Advised to start ibuprofen 600 mg 3 times daily  Along with muscle relaxer   -     ibuprofen (MOTRIN) 600 mg tablet; Take 1 tablet (600 mg total) by mouth every 8 (eight) hours as needed for mild pain or moderate pain  -     tiZANidine (ZANAFLEX) 4 mg tablet; Take 1 tablet (4 mg total) by mouth daily at bedtime      Follow up in 1 month    Subjective:      Patient ID: Robin Isaac is a 66 y o  male  Back Pain  Patient presents for evaluation of low back problems  Symptoms have been present for a few weeks and include pain in right mid back (sharp, shooting and stabbing in character; 10/10 in severity)  Initial inciting event: he was shoveling snow 3 weeks ago  Symptoms are worse in the morning, in the middle of the day and in the afternoon  Alleviating factors, none  Aggravating factors identifiable by the patient are bending backwards, bending forwards, bending sideways, standing and walking  Treatments initiated by the patient: ibuprofen OTC  Previous lower back problems: none  Previous work up: none  The following portions of the patient's history were reviewed and updated as appropriate:   He  has a past medical history of GERD (gastroesophageal reflux disease); Hyperlipidemia; and Seasonal allergies    He   Patient Active Problem List    Diagnosis Date Noted    Acute right-sided thoracic back pain 12/04/2018    Dyspepsia 05/15/2018    Serum total bilirubin elevated 05/15/2018    Epigastric pain 05/10/2018    Gastroesophageal reflux disease without esophagitis 05/02/2018    Hyperlipidemia 05/02/2018    Prehypertension 05/02/2018    Diabetes mellitus screening 05/02/2018    Prostate cancer screening 05/02/2018     He  has a past surgical history that includes Cholecystectomy; Sinus surgery; EAR SURGERY; Colonoscopy (N/A, 10/4/2017); Cochlear implant; and pr esophagogastroduodenoscopy transoral diagnostic (N/A, 5/31/2018)  His family history includes Colon cancer in his mother; Heart attack in his father; Hypertension in his brother, father, and sister; Other in his mother  He  reports that he quit smoking about 43 years ago  He has never used smokeless tobacco  He reports that he drinks about 1 8 oz of alcohol per week   He reports that he does not use drugs  Current Outpatient Prescriptions   Medication Sig Dispense Refill    aspirin (ECOTRIN LOW STRENGTH) 81 mg EC tablet TAKE 1 TABLET DAILY 90 tablet 2    atorvastatin (LIPITOR) 40 mg tablet Take 1 tablet (40 mg total) by mouth daily 90 tablet 1    dexlansoprazole (DEXILANT) 60 MG capsule Take 1 capsule (60 mg total) by mouth daily 90 capsule 3    dicyclomine (BENTYL) 20 mg tablet Take 1 tablet (20 mg total) by mouth every 6 (six) hours 30 tablet 0    aluminum hydroxide-magnesium carbonate (GAVISCON)  mg/15 mL oral suspension Take 15 mL by mouth 4 (four) times daily (after meals and at bedtime) (Patient not taking: Reported on 12/4/2018 ) 3 Bottle 3    famotidine (PEPCID) 40 MG tablet Take 1 tablet (40 mg total) by mouth daily for 90 days 90 tablet 3    ibuprofen (MOTRIN) 600 mg tablet Take 1 tablet (600 mg total) by mouth every 8 (eight) hours as needed for mild pain or moderate pain 30 tablet 1    tiZANidine (ZANAFLEX) 4 mg tablet Take 1 tablet (4 mg total) by mouth daily at bedtime 30 tablet 1     No current facility-administered medications for this visit        Current Outpatient Prescriptions on File Prior to Visit   Medication Sig    aspirin (ECOTRIN LOW STRENGTH) 81 mg EC tablet TAKE 1 TABLET DAILY    atorvastatin (LIPITOR) 40 mg tablet Take 1 tablet (40 mg total) by mouth daily    dexlansoprazole (DEXILANT) 60 MG capsule Take 1 capsule (60 mg total) by mouth daily    dicyclomine (BENTYL) 20 mg tablet Take 1 tablet (20 mg total) by mouth every 6 (six) hours    aluminum hydroxide-magnesium carbonate (GAVISCON)  mg/15 mL oral suspension Take 15 mL by mouth 4 (four) times daily (after meals and at bedtime) (Patient not taking: Reported on 12/4/2018 )    famotidine (PEPCID) 40 MG tablet Take 1 tablet (40 mg total) by mouth daily for 90 days     No current facility-administered medications on file prior to visit  He has No Known Allergies       Review of Systems   Constitutional: Negative for activity change, appetite change, fatigue and fever  HENT: Negative for congestion and ear discharge  Respiratory: Negative for cough and shortness of breath  Cardiovascular: Negative for chest pain and palpitations  Gastrointestinal: Negative for diarrhea and nausea  Musculoskeletal: Positive for arthralgias and back pain  Skin: Negative for color change and rash  Neurological: Negative for dizziness and headaches  Psychiatric/Behavioral: Negative for agitation and behavioral problems  Objective:      /80   Pulse 82   Temp 98 9 °F (37 2 °C) (Tympanic)   Resp 18   Ht 5' 10" (1 778 m)   Wt 101 kg (223 lb)   SpO2 99%   BMI 32 00 kg/m²          Physical Exam   Constitutional: He is oriented to person, place, and time  He appears well-developed and well-nourished  No distress  Eyes: Pupils are equal, round, and reactive to light  No scleral icterus  Cardiovascular: Normal rate, regular rhythm and normal heart sounds  No murmur heard  Pulmonary/Chest: Effort normal and breath sounds normal  No respiratory distress  He has no wheezes  Abdominal: Soft  Bowel sounds are normal  He exhibits no distension  There is no tenderness  Musculoskeletal: He exhibits tenderness  He exhibits no edema or deformity  FROM of lumbar spine at the hip  Tenderness of mid and lumbar spine   Neurological: He is alert and oriented to person, place, and time  Skin: Skin is warm and dry  No rash noted  He is not diaphoretic  Psychiatric: He has a normal mood and affect

## 2018-12-11 ENCOUNTER — TELEPHONE (OUTPATIENT)
Dept: GASTROENTEROLOGY | Facility: CLINIC | Age: 63
End: 2018-12-11

## 2019-01-28 DIAGNOSIS — E78.5 HYPERLIPIDEMIA, UNSPECIFIED HYPERLIPIDEMIA TYPE: ICD-10-CM

## 2019-01-28 RX ORDER — ATORVASTATIN CALCIUM 40 MG/1
TABLET, FILM COATED ORAL
Qty: 90 TABLET | Refills: 1 | Status: SHIPPED | OUTPATIENT
Start: 2019-01-28 | End: 2019-08-07 | Stop reason: SDUPTHER

## 2019-03-28 ENCOUNTER — TELEPHONE (OUTPATIENT)
Dept: GASTROENTEROLOGY | Facility: CLINIC | Age: 64
End: 2019-03-28

## 2019-03-28 NOTE — TELEPHONE ENCOUNTER
Regarding: Prescription Question  Contact: [phone]  ----- Message from [address] [place] [address] [address], Generic sent at 3/27/2019  8:07 PM EDT -----    Could you please write a new prescription for Dexlansoprazole 60 MG capsule "Medically Necessary"  This was the original script you gave me and it worked great  However, [facility] told me that because of the expense they would not cover dexlansoprazole  [facility] called your office and got a script for Lansoprazole Dr  30mg  I take 1 pill first thing in morning and by 8-9pm reflux returns, I need to return to taking the Dexlansoprazole  Thank you      Torie Villarreal  [phone]

## 2019-03-29 DIAGNOSIS — R10.13 EPIGASTRIC PAIN: ICD-10-CM

## 2019-03-29 DIAGNOSIS — R10.13 DYSPEPSIA: ICD-10-CM

## 2019-03-29 RX ORDER — DEXLANSOPRAZOLE 60 MG/1
60 CAPSULE, DELAYED RELEASE ORAL DAILY
Qty: 90 CAPSULE | Refills: 3 | Status: SHIPPED | OUTPATIENT
Start: 2019-03-29 | End: 2019-04-15 | Stop reason: ALTCHOICE

## 2019-04-02 ENCOUNTER — TELEPHONE (OUTPATIENT)
Dept: FAMILY MEDICINE CLINIC | Facility: CLINIC | Age: 64
End: 2019-04-02

## 2019-04-02 DIAGNOSIS — E78.2 MIXED HYPERLIPIDEMIA: Primary | ICD-10-CM

## 2019-04-02 DIAGNOSIS — R73.03 PREDIABETES: ICD-10-CM

## 2019-04-02 DIAGNOSIS — K21.9 GASTROESOPHAGEAL REFLUX DISEASE WITHOUT ESOPHAGITIS: ICD-10-CM

## 2019-04-02 DIAGNOSIS — M54.6 ACUTE RIGHT-SIDED THORACIC BACK PAIN: Primary | ICD-10-CM

## 2019-04-02 DIAGNOSIS — Z13.1 DIABETES MELLITUS SCREENING: ICD-10-CM

## 2019-04-02 DIAGNOSIS — Z12.5 PROSTATE CANCER SCREENING: ICD-10-CM

## 2019-04-04 LAB
ALBUMIN SERPL-MCNC: 4.5 G/DL (ref 3.6–4.8)
ALBUMIN/GLOB SERPL: 2.4 {RATIO} (ref 1.2–2.2)
ALP SERPL-CCNC: 79 IU/L (ref 39–117)
ALT SERPL-CCNC: 40 IU/L (ref 0–44)
AST SERPL-CCNC: 26 IU/L (ref 0–40)
BASOPHILS # BLD AUTO: 0 X10E3/UL (ref 0–0.2)
BASOPHILS NFR BLD AUTO: 0 %
BILIRUB SERPL-MCNC: 0.9 MG/DL (ref 0–1.2)
BUN SERPL-MCNC: 15 MG/DL (ref 8–27)
BUN/CREAT SERPL: 15 (ref 10–24)
CALCIUM SERPL-MCNC: 9.1 MG/DL (ref 8.6–10.2)
CHLORIDE SERPL-SCNC: 103 MMOL/L (ref 96–106)
CHOLEST SERPL-MCNC: 140 MG/DL (ref 100–199)
CO2 SERPL-SCNC: 27 MMOL/L (ref 20–29)
CREAT SERPL-MCNC: 1.02 MG/DL (ref 0.76–1.27)
EOSINOPHIL # BLD AUTO: 0.4 X10E3/UL (ref 0–0.4)
EOSINOPHIL NFR BLD AUTO: 4 %
ERYTHROCYTE [DISTWIDTH] IN BLOOD BY AUTOMATED COUNT: 13.4 % (ref 12.3–15.4)
GLOBULIN SER-MCNC: 1.9 G/DL (ref 1.5–4.5)
GLUCOSE SERPL-MCNC: 115 MG/DL (ref 65–99)
HBA1C MFR BLD: 5.8 % (ref 4.8–5.6)
HCT VFR BLD AUTO: 52.1 % (ref 37.5–51)
HDLC SERPL-MCNC: 41 MG/DL
HGB BLD-MCNC: 17.2 G/DL (ref 13–17.7)
IMM GRANULOCYTES # BLD: 0 X10E3/UL (ref 0–0.1)
IMM GRANULOCYTES NFR BLD: 0 %
LABCORP COMMENT: NORMAL
LDLC SERPL CALC-MCNC: 78 MG/DL (ref 0–99)
LYMPHOCYTES # BLD AUTO: 0.9 X10E3/UL (ref 0.7–3.1)
LYMPHOCYTES NFR BLD AUTO: 9 %
MCH RBC QN AUTO: 30.4 PG (ref 26.6–33)
MCHC RBC AUTO-ENTMCNC: 33 G/DL (ref 31.5–35.7)
MCV RBC AUTO: 92 FL (ref 79–97)
MONOCYTES # BLD AUTO: 1 X10E3/UL (ref 0.1–0.9)
MONOCYTES NFR BLD AUTO: 10 %
NEUTROPHILS # BLD AUTO: 7.6 X10E3/UL (ref 1.4–7)
NEUTROPHILS NFR BLD AUTO: 77 %
PLATELET # BLD AUTO: 208 X10E3/UL (ref 150–379)
POTASSIUM SERPL-SCNC: 4.4 MMOL/L (ref 3.5–5.2)
PROT SERPL-MCNC: 6.4 G/DL (ref 6–8.5)
PSA SERPL-MCNC: 2.1 NG/ML (ref 0–4)
RBC # BLD AUTO: 5.65 X10E6/UL (ref 4.14–5.8)
SL AMB EGFR AFRICAN AMERICAN: 91 ML/MIN/1.73
SL AMB EGFR NON AFRICAN AMERICAN: 78 ML/MIN/1.73
SL AMB VLDL CHOLESTEROL CALC: 21 MG/DL (ref 5–40)
SODIUM SERPL-SCNC: 142 MMOL/L (ref 134–144)
TRIGL SERPL-MCNC: 103 MG/DL (ref 0–149)
WBC # BLD AUTO: 9.8 X10E3/UL (ref 3.4–10.8)

## 2019-04-15 ENCOUNTER — TELEPHONE (OUTPATIENT)
Dept: FAMILY MEDICINE CLINIC | Facility: CLINIC | Age: 64
End: 2019-04-15

## 2019-04-15 ENCOUNTER — OFFICE VISIT (OUTPATIENT)
Dept: FAMILY MEDICINE CLINIC | Facility: CLINIC | Age: 64
End: 2019-04-15
Payer: COMMERCIAL

## 2019-04-15 VITALS
HEIGHT: 70 IN | DIASTOLIC BLOOD PRESSURE: 74 MMHG | WEIGHT: 221 LBS | BODY MASS INDEX: 31.64 KG/M2 | SYSTOLIC BLOOD PRESSURE: 136 MMHG | TEMPERATURE: 96.9 F | OXYGEN SATURATION: 95 % | HEART RATE: 74 BPM

## 2019-04-15 DIAGNOSIS — M25.551 RIGHT HIP PAIN: Primary | ICD-10-CM

## 2019-04-15 DIAGNOSIS — E78.5 HYPERLIPIDEMIA, UNSPECIFIED HYPERLIPIDEMIA TYPE: ICD-10-CM

## 2019-04-15 DIAGNOSIS — R05.9 COUGH: ICD-10-CM

## 2019-04-15 DIAGNOSIS — R73.01 IMPAIRED FASTING GLUCOSE: ICD-10-CM

## 2019-04-15 PROCEDURE — 99214 OFFICE O/P EST MOD 30 MIN: CPT | Performed by: FAMILY MEDICINE

## 2019-04-15 RX ORDER — LORATADINE 10 MG/1
10 TABLET ORAL DAILY
Qty: 20 TABLET | Refills: 1 | Status: SHIPPED | OUTPATIENT
Start: 2019-04-15 | End: 2020-11-02 | Stop reason: ALTCHOICE

## 2019-04-15 RX ORDER — DEXTROMETHORPHAN HYDROBROMIDE AND PROMETHAZINE HYDROCHLORIDE 15; 6.25 MG/5ML; MG/5ML
5 SYRUP ORAL 4 TIMES DAILY PRN
Qty: 118 ML | Refills: 1 | Status: SHIPPED | OUTPATIENT
Start: 2019-04-15 | End: 2019-07-12 | Stop reason: ALTCHOICE

## 2019-04-15 RX ORDER — FLUTICASONE PROPIONATE 50 MCG
1 SPRAY, SUSPENSION (ML) NASAL DAILY
Qty: 1 BOTTLE | Refills: 1 | Status: SHIPPED | OUTPATIENT
Start: 2019-04-15 | End: 2020-11-02 | Stop reason: SDUPTHER

## 2019-04-15 RX ORDER — DEXLANSOPRAZOLE 60 MG/1
60 CAPSULE, DELAYED RELEASE ORAL DAILY
COMMUNITY
End: 2020-03-25 | Stop reason: SDUPTHER

## 2019-04-29 DIAGNOSIS — I10 ESSENTIAL HYPERTENSION: ICD-10-CM

## 2019-04-29 RX ORDER — ASPIRIN 81 MG/1
TABLET, COATED ORAL
Qty: 90 TABLET | Refills: 2 | Status: SHIPPED | OUTPATIENT
Start: 2019-04-29 | End: 2020-03-06

## 2019-07-12 ENCOUNTER — OFFICE VISIT (OUTPATIENT)
Dept: FAMILY MEDICINE CLINIC | Facility: CLINIC | Age: 64
End: 2019-07-12
Payer: COMMERCIAL

## 2019-07-12 VITALS
HEART RATE: 73 BPM | BODY MASS INDEX: 31.32 KG/M2 | HEIGHT: 70 IN | SYSTOLIC BLOOD PRESSURE: 126 MMHG | TEMPERATURE: 99.4 F | WEIGHT: 218.8 LBS | OXYGEN SATURATION: 97 % | DIASTOLIC BLOOD PRESSURE: 80 MMHG

## 2019-07-12 DIAGNOSIS — F41.9 ANXIETY: ICD-10-CM

## 2019-07-12 DIAGNOSIS — R46.89 BEHAVIORAL CHANGE: ICD-10-CM

## 2019-07-12 DIAGNOSIS — R41.3 MEMORY LOSS: Primary | ICD-10-CM

## 2019-07-12 PROCEDURE — 99214 OFFICE O/P EST MOD 30 MIN: CPT | Performed by: FAMILY MEDICINE

## 2019-07-12 PROCEDURE — 3008F BODY MASS INDEX DOCD: CPT | Performed by: FAMILY MEDICINE

## 2019-07-12 RX ORDER — LORAZEPAM 0.5 MG/1
0.5 TABLET ORAL DAILY PRN
Qty: 30 TABLET | Refills: 0 | Status: SHIPPED | OUTPATIENT
Start: 2019-07-12 | End: 2021-04-22 | Stop reason: ALTCHOICE

## 2019-07-12 NOTE — PROGRESS NOTES
BMI Counseling: Body mass index is 31 39 kg/m²  Discussed the patient's BMI with him  The BMI is above average  BMI counseling and education was provided to the patient  Nutrition recommendations include reducing portion sizes and 3-5 servings of fruits/vegetables daily  Exercise recommendations include exercising 3-5 times per week

## 2019-07-12 NOTE — PROGRESS NOTES
Assessment/Plan:    No problem-specific Assessment & Plan notes found for this encounter  Diagnoses and all orders for this visit:    Memory loss  -     MRI brain wo contrast; Future  -     Vitamin B12; Future  -     TSH, 3rd generation with Free T4 reflex; Future  -     [facility]; Future    Anxiety  After discussing risks and benefits of medication along with side effects will start lorazepam as needed at this time  B12 and TSH ordered  Also referral for MRI of the brain   -     MRI brain wo contrast; Future  -     Vitamin B12; Future  -     TSH, 3rd generation with Free T4 reflex; Future  -     Ambulatory referral [facility]; Future  -     LORazepam (ATIVAN) 0 5 mg tablet; Take 1 tablet (0 5 mg total) by mouth daily as needed for anxiety  -     Ambulatory referral to [facility]; Future    Behavioral change  -     MRI brain wo contrast; Future  -     Vitamin B12; Future  -     TSH, 3rd generation with Free T4 reflex; Future  -     Ambulatory referral to [facility]; Future      Follow up in 1 month or as needed    Subjective:      Patient ID: Paul Minors is a 60 y o  male  Patient is here because he has been feeling very anxious recently his mood has been changing  He has been having a little patience with people and situations at home  His wife said that he has mood has been changing recently and he seems to be very short fused recently  He denies any suicidal homicidal ideation  He decided and denies any depression symptoms at this time  He said that his memory has been problematic as well he has been having some recent memory issues as well  The following portions of the patient's history were reviewed and updated as appropriate:   He  has a past medical history of GERD (gastroesophageal reflux disease), Hyperlipidemia, and Seasonal allergies    He   Patient Active Problem List    Diagnosis Date Noted    Memory loss 07/12/2019    Anxiety 07/12/2019    Behavioral change 07/12/2019    Right hip pain 04/15/2019    Impaired fasting glucose 04/15/2019    Cough 04/15/2019    Acute right-sided thoracic back pain 12/04/2018    Dyspepsia 05/15/2018    Serum total bilirubin elevated 05/15/2018    Epigastric pain 05/10/2018    Gastroesophageal reflux disease without esophagitis 05/02/2018    Hyperlipidemia 05/02/2018    Prehypertension 05/02/2018    Diabetes mellitus screening 05/02/2018    Prostate cancer screening 05/02/2018     He  has a past surgical history that includes Cholecystectomy; Sinus surgery; EAR SURGERY; Colonoscopy (N/A, 10/4/2017); Cochlear implant; and pr esophagogastroduodenoscopy transoral diagnostic (N/A, 5/31/2018)  His family history includes Colon cancer in his mother; Heart attack in his father; Hypertension in his brother, father, and sister; Other in his mother  He  reports that he quit smoking about 44 years ago  He has never used smokeless tobacco  He reports that he drinks about 3 0 standard drinks of alcohol per week  He reports that he does not use drugs  Current Outpatient Medications   Medication Sig Dispense Refill    ASPIRIN LOW DOSE 81 MG EC tablet TAKE 1 TABLET DAILY 90 tablet 2    atorvastatin (LIPITOR) 40 mg tablet TAKE 1 TABLET DAILY 90 tablet 1    dexlansoprazole (DEXILANT) 60 MG capsule Take 60 mg by mouth daily      fluticasone (FLONASE) 50 mcg/act nasal spray 1 spray into each nostril daily 1 Bottle 1    loratadine (CLARITIN) 10 mg tablet Take 1 tablet (10 mg total) by mouth daily for 20 days 20 tablet 1    LORazepam (ATIVAN) 0 5 mg tablet Take 1 tablet (0 5 mg total) by mouth daily as needed for anxiety 30 tablet 0     No current facility-administered medications for this visit        Current Outpatient Medications on File Prior to Visit   Medication Sig    ASPIRIN LOW DOSE 81 MG EC tablet TAKE 1 TABLET DAILY    atorvastatin (LIPITOR) 40 mg tablet TAKE 1 TABLET DAILY    dexlansoprazole (DEXILANT) 60 MG capsule Take 60 mg by mouth daily    fluticasone (FLONASE) 50 mcg/act nasal spray 1 spray into each nostril daily    loratadine (CLARITIN) 10 mg tablet Take 1 tablet (10 mg total) by mouth daily for 20 days    [DISCONTINUED] promethazine-dextromethorphan (PHENERGAN-DM) 6 25-15 mg/5 mL oral syrup Take 5 mL by mouth 4 (four) times a day as needed for cough     No current facility-administered medications on file prior to visit  He has No Known Allergies       Review of Systems   Constitutional: Negative for activity change, appetite change, fatigue and fever  HENT: Negative for congestion and ear discharge  Respiratory: Negative for cough and shortness of breath  Cardiovascular: Negative for chest pain and palpitations  Gastrointestinal: Negative for diarrhea and nausea  Musculoskeletal: Negative for arthralgias and back pain  Skin: Negative for color change and rash  Neurological: Negative for dizziness and headaches  Psychiatric/Behavioral: Positive for agitation, behavioral problems and decreased concentration  The patient is nervous/anxious and is hyperactive  Objective:      /80   Pulse 73   Temp 99 4 °F (37 4 °C)   Ht 5' 10" (1 778 m)   Wt 99 2 kg (218 lb 12 8 oz)   SpO2 97%   BMI 31 39 kg/m²          Physical Exam   Constitutional: He is oriented to person, place, and time  He appears well-developed and well-nourished  No distress  Eyes: Pupils are equal, round, and reactive to light  No scleral icterus  Cardiovascular: Normal rate, regular rhythm and normal heart sounds  No murmur heard  Pulmonary/Chest: Effort normal and breath sounds normal  No respiratory distress  He has no wheezes  Abdominal: Soft  Bowel sounds are normal  He exhibits no distension  There is no tenderness  Neurological: He is alert and oriented to person, place, and time  Skin: Skin is warm and dry  No rash noted  He is not diaphoretic  Psychiatric: He has a normal mood and affect

## 2019-07-17 LAB
TSH SERPL DL<=0.005 MIU/L-ACNC: 2.81 UIU/ML (ref 0.45–4.5)
VIT B12 SERPL-MCNC: 780 PG/ML (ref 232–1245)

## 2019-07-26 ENCOUNTER — HOSPITAL ENCOUNTER (OUTPATIENT)
Dept: MRI IMAGING | Facility: HOSPITAL | Age: 64
Discharge: HOME | End: 2019-07-26
Attending: FAMILY MEDICINE
Payer: COMMERCIAL

## 2019-07-26 DIAGNOSIS — R46.89 BEHAVIORAL CHANGE: ICD-10-CM

## 2019-07-26 DIAGNOSIS — R41.3 MEMORY LOSS: ICD-10-CM

## 2019-07-26 DIAGNOSIS — F41.9 ANXIETY: ICD-10-CM

## 2019-07-26 PROCEDURE — 70551 MRI BRAIN STEM W/O DYE: CPT

## 2019-07-29 ENCOUNTER — TELEPHONE (OUTPATIENT)
Dept: FAMILY MEDICINE CLINIC | Facility: CLINIC | Age: 64
End: 2019-07-29

## 2019-07-29 DIAGNOSIS — R41.3 MEMORY LOSS: Primary | ICD-10-CM

## 2019-08-07 DIAGNOSIS — E78.5 HYPERLIPIDEMIA, UNSPECIFIED HYPERLIPIDEMIA TYPE: ICD-10-CM

## 2019-08-07 RX ORDER — ATORVASTATIN CALCIUM 40 MG/1
TABLET, FILM COATED ORAL
Qty: 90 TABLET | Refills: 1 | Status: SHIPPED | OUTPATIENT
Start: 2019-08-07 | End: 2020-01-12

## 2019-08-12 ENCOUNTER — CONSULT (OUTPATIENT)
Dept: NEUROLOGY | Facility: CLINIC | Age: 64
End: 2019-08-12
Payer: COMMERCIAL

## 2019-08-12 VITALS
HEIGHT: 70 IN | WEIGHT: 218 LBS | SYSTOLIC BLOOD PRESSURE: 118 MMHG | HEART RATE: 80 BPM | DIASTOLIC BLOOD PRESSURE: 70 MMHG | BODY MASS INDEX: 31.21 KG/M2

## 2019-08-12 DIAGNOSIS — R41.3 MEMORY DIFFICULTY: Primary | ICD-10-CM

## 2019-08-12 DIAGNOSIS — R41.3 MEMORY LOSS: ICD-10-CM

## 2019-08-12 PROCEDURE — 99244 OFF/OP CNSLTJ NEW/EST MOD 40: CPT | Performed by: PSYCHIATRY & NEUROLOGY

## 2019-08-12 NOTE — PROGRESS NOTES
Theodora Harper is a 60 y o  male  Chief Complaint   Patient presents with    Memory Loss       Assessment:  1  Memory loss          Discussion:  Differential diagnosis discussed with the patient and his wife, I do not think patient has a true cognitive impairment, his memory issues could be related to his stress, but we will need to monitor that in the future, his MRI scan of the brain and routine blood work was unremarkable, will check a Lyme test folic acid and RPR that was not checked, also would recommend patient to go for a detailed neuropsych testing as a baseline test, he will call me after the test to discuss the results, I have advised him mentally stimulating exercises and to avoid stress, he also probably has benign essential tremor, currently not bothersome, we will try to monitor that, he was advised to go to the hospital if has any worsening symptoms and call me otherwise to see me back in 2 months and follow up with family physician  Subjective:    HPI   Patient is here for evaluation of memory difficulty, since he is retired 4 years back, he would forget where he has kept his keys, also he used to work in a highly stressed job and is currently under stress due to his children, he does not have any difficulty managing his finances or driving, he is good with names, his wife is also having some health issues and he is concerned, no headaches, no vision or speech difficulty, at baseline he has some intentional tremor for many years but it is not bothersome, he denies any rest tremor, no difficulty in swallowing, no early morning stiffness, no motor or sensory symptoms in upper or lower extremity, appetite is good weight has been stable, no other complaints      Vitals:    08/12/19 1319   BP: 118/70   BP Location: Left arm   Patient Position: Sitting   Cuff Size: Adult   Pulse: 80   Weight: 98 9 kg (218 lb)   Height: 5' 10" (1 778 m)       Current Medications    Current Outpatient Medications:    ASPIRIN LOW DOSE 81 MG EC tablet, TAKE 1 TABLET DAILY, Disp: 90 tablet, Rfl: 2    atorvastatin (LIPITOR) 40 mg tablet, TAKE 1 TABLET DAILY, Disp: 90 tablet, Rfl: 1    dexlansoprazole (DEXILANT) 60 MG capsule, Take 60 mg by mouth daily, Disp: , Rfl:     fluticasone (FLONASE) 50 mcg/act nasal spray, 1 spray into each nostril daily (Patient taking differently: 1 spray into each nostril daily as needed for rhinitis ), Disp: 1 Bottle, Rfl: 1    loratadine (CLARITIN) 10 mg tablet, Take 1 tablet (10 mg total) by mouth daily for 20 days (Patient taking differently: Take 10 mg by mouth daily as needed for allergies ), Disp: 20 tablet, Rfl: 1    LORazepam (ATIVAN) 0 5 mg tablet, Take 1 tablet (0 5 mg total) by mouth daily as needed for anxiety, Disp: 30 tablet, Rfl: 0      Allergies  Patient has no known allergies  Past Medical History  Past Medical History:   Diagnosis Date    GERD (gastroesophageal reflux disease)     Hyperlipidemia     Seasonal allergies          Past Surgical History:  Past Surgical History:   Procedure Laterality Date    CHOLECYSTECTOMY      COLONOSCOPY N/A 10/4/2017    Procedure: COLONOSCOPY;  Surgeon: Anupama Archibald MD;  Location: [facility]; Service: Gastroenterology    EAR SURGERY      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)    NC ESOPHAGOGASTRODUODENOSCOPY TRANSORAL DIAGNOSTIC N/A 5/31/2018    Procedure: ESOPHAGOGASTRODUODENOSCOPY (EGD); Surgeon: Anupama Archibald MD;  Location: [facility]; Service: Gastroenterology    SINUS SURGERY           Family History:  Family History   Problem Relation Age of Onset    Colon cancer Mother     Other Mother         septicemia    Hypertension Father     Heart attack Father         NSTEMI    Hypertension Sister     Hypertension Brother        Social History:   reports that he quit smoking about 44 years ago  He has never used smokeless tobacco  He reports that he drinks about 3 0 standard drinks of alcohol per week   He reports that he does not use drugs  I have reviewed the past medical history, surgical history, social and family history, current medications, allergies vitals, review of systems, and updated this information as appropriate today  Objective:    Physical Exam    Neurological Exam    GENERAL:  Cooperative in no acute distress  Well-developed and well-nourished    HEAD and NECK   Head is atraumatic normocephalic with no lesions or masses  Neck is supple with full range of motion    CARDIOVASCULAR  Carotid Arteries-no carotid bruits  NEUROLOGIC:  Mental Status-the patient is awake alert and oriented without aphasia or apraxia, [place] is 28/30  Cranial Nerves: Visual fields are full to confrontation  Discs are flat  Limited exam as unable to dilate the pupils Extraocular movements are full without nystagmus  Pupils are 2-1/2 mm and reactive  Face is symmetrical to light touch  Movements of facial expression move symmetrically  Hearing is normal to finger rub bilaterally  Soft palate lifts symmetrically  Shoulder shrug is symmetrical  Tongue is midline without atrophy  Motor: No drift is noted on arm extension  Strength is full in the upper and lower extremities with normal bulk and tone  Sensory: Intact to temperature and vibratory sensation in the upper and lower extremities bilaterally  Cortical function is intact  Coordination: Finger to nose testing is performed accurately  Romberg is negative  Gait reveals a normal base with symmetrical arm swing  Tandem walk is normal   Reflexes:      1+ and symmetrical Toes are downgoing  No spine tenderness          ROS:  Review of Systems   Constitutional: Negative  Negative for appetite change, fatigue and fever  HENT: Negative  Negative for hearing loss, tinnitus, trouble swallowing and voice change  Eyes: Negative  Negative for photophobia and pain  Respiratory: Negative  Negative for shortness of breath and wheezing  Cardiovascular: Negative    Negative for chest pain and palpitations  Gastrointestinal: Negative  Negative for nausea and vomiting  Endocrine: Negative  Negative for cold intolerance and heat intolerance  Genitourinary: Negative  Negative for dysuria, frequency and urgency  Musculoskeletal: Negative  Negative for arthralgias, back pain, gait problem, myalgias, neck pain and neck stiffness  Skin: Negative  Negative for rash  Allergic/Immunologic: Negative  Neurological: Positive for tremors  Negative for dizziness, seizures, syncope, facial asymmetry, speech difficulty, weakness, light-headedness, numbness and headaches  Hematological: Negative  Does not bruise/bleed easily  Psychiatric/Behavioral: Positive for agitation, decreased concentration (Memory) and dysphoric mood  Negative for confusion, hallucinations and sleep disturbance  The patient is nervous/anxious

## 2019-08-20 LAB
B BURGDOR IGG+IGM SER-ACNC: <0.91 ISR (ref 0–0.9)
B BURGDOR IGM SER IA-ACNC: <0.8 INDEX (ref 0–0.79)
FOLATE SERPL-MCNC: 12.4 NG/ML
RPR SER QL: NON REACTIVE

## 2020-01-11 DIAGNOSIS — E78.5 HYPERLIPIDEMIA, UNSPECIFIED HYPERLIPIDEMIA TYPE: ICD-10-CM

## 2020-01-12 RX ORDER — ATORVASTATIN CALCIUM 40 MG/1
TABLET, FILM COATED ORAL
Qty: 90 TABLET | Refills: 0 | Status: SHIPPED | OUTPATIENT
Start: 2020-01-12 | End: 2020-04-10

## 2020-03-06 DIAGNOSIS — I10 ESSENTIAL HYPERTENSION: ICD-10-CM

## 2020-03-06 RX ORDER — ASPIRIN 81 MG/1
TABLET, COATED ORAL
Qty: 90 TABLET | Refills: 3 | Status: SHIPPED | OUTPATIENT
Start: 2020-03-06

## 2020-03-25 DIAGNOSIS — K21.9 GASTROESOPHAGEAL REFLUX DISEASE WITHOUT ESOPHAGITIS: ICD-10-CM

## 2020-03-25 DIAGNOSIS — K21.9 GASTROESOPHAGEAL REFLUX DISEASE WITHOUT ESOPHAGITIS: Primary | ICD-10-CM

## 2020-03-25 RX ORDER — PANTOPRAZOLE SODIUM 20 MG/1
TABLET, DELAYED RELEASE ORAL
Qty: 90 TABLET | Refills: 3 | Status: SHIPPED | OUTPATIENT
Start: 2020-03-25 | End: 2021-01-15

## 2020-03-25 RX ORDER — DEXLANSOPRAZOLE 60 MG/1
60 CAPSULE, DELAYED RELEASE ORAL DAILY
Qty: 30 CAPSULE | Refills: 0 | Status: SHIPPED | OUTPATIENT
Start: 2020-03-25 | End: 2020-03-25

## 2020-03-27 ENCOUNTER — TELEMEDICINE (OUTPATIENT)
Dept: GASTROENTEROLOGY | Facility: CLINIC | Age: 65
End: 2020-03-27
Payer: COMMERCIAL

## 2020-03-27 ENCOUNTER — TELEPHONE (OUTPATIENT)
Dept: GASTROENTEROLOGY | Facility: CLINIC | Age: 65
End: 2020-03-27

## 2020-03-27 DIAGNOSIS — K21.9 GASTROESOPHAGEAL REFLUX DISEASE WITHOUT ESOPHAGITIS: Primary | ICD-10-CM

## 2020-03-27 DIAGNOSIS — K76.0 FATTY LIVER: ICD-10-CM

## 2020-03-27 PROCEDURE — 1036F TOBACCO NON-USER: CPT | Performed by: INTERNAL MEDICINE

## 2020-03-27 PROCEDURE — 3078F DIAST BP <80 MM HG: CPT | Performed by: INTERNAL MEDICINE

## 2020-03-27 PROCEDURE — 3074F SYST BP LT 130 MM HG: CPT | Performed by: INTERNAL MEDICINE

## 2020-03-27 PROCEDURE — 99214 OFFICE O/P EST MOD 30 MIN: CPT | Performed by: INTERNAL MEDICINE

## 2020-03-27 NOTE — PROGRESS NOTES
[facility]      Chief Complaint:  GERD    This is a virtual visit in the patient accepts the limitations on accuracy of diagnosis because of this    HPI:  Lane Phelps is a 63 y o   male who presents with several GI issues  The 1st is a history of chronic GERD  The patient tried many proton pump inhibitors but Rhae Jaramillo is the only thing that keeps and under relative control  He notes that if he eats certain foods and eats too much she still gets reflux especially at night  He has occasional nocturnal symptomatology  He has no nausea or vomiting  He has no dysphagia  He has no melena or hematochezia  If he takes Tums it helps a bit  Patient has no exertional symptomatology  We spoke about using over-the-counter Pepcid at night  EGD done in June 2018 showed small polyps but was otherwise unremarkable  Most recent patient's A1c is 5 8  Liver functions were normal but an ultrasound showed a mild fatty liver  We also discussed the use of a low-carbohydrate diet  Patient will continue his other current medications  He has no other GI complaints  No chest pain dizziness or loss of consciousness  No seizures  He does have some mild memory issues         Review of Systems:   Constitutional: No fever or chills, feels well, no tiredness, no recent weight gain or weight loss  HENT: No complaints of earache, no hearing loss, no nosebleeds, no nasal discharge, no sore throat, no hoarseness  Eyes: No complaints of eye pain, no red eyes, no discharge from eyes, no itchy eyes  Cardiovascular: No complaints of slow heart rate, no fast heart rate, no chest pain, no palpitations, no leg claudication, no lower extremity edema  Respiratory: No complaints of shortness of breath, no wheezing, no cough, no SOB on exertion, no orthopnea  Gastrointestinal: As noted in HPI  Genitourinary: No complaints of dysuria, no incontinence, no hesitancy, no nocturia     Musculoskeletal: No complaints of arthralgia, no myalgias, no joint swelling or stiffness, no limb pain or swelling  Neurological:  As per HPI  Skin: No complaints of skin rash or skin lesions, no itching, no skin wound, no dry skin  Hematological/Lymphatic: No complaints of swollen glands, does not bleed easy  Allergic/Immunologic: No immunocompromised state  Endocrine:  No complaints of polyuria, no polydipsia  Psychiatric/Behavioral: is not suicidal, no sleep disturbances, no anxiety or depression, no change in personality, no emotional problems  Historical Information   Past Medical History:   Diagnosis Date    GERD (gastroesophageal reflux disease)     Hyperlipidemia     Seasonal allergies      Past Surgical History:   Procedure Laterality Date    CHOLECYSTECTOMY      COLONOSCOPY N/A 10/4/2017    Procedure: COLONOSCOPY;  Surgeon: Ajith Augustine MD;  Location: [facility]; Service: Gastroenterology    EAR SURGERY      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)    DE ESOPHAGOGASTRODUODENOSCOPY TRANSORAL DIAGNOSTIC N/A 2018    Procedure: ESOPHAGOGASTRODUODENOSCOPY (EGD); Surgeon: Ajith Augustine MD;  Location: [facility];   Service: Gastroenterology    SINUS SURGERY       Social History   Social History     Substance and Sexual Activity   Alcohol Use Yes    Alcohol/week: 3 0 standard drinks    Types: 3 Glasses of wine per week     Social History     Substance and Sexual Activity   Drug Use No     Social History     Tobacco Use   Smoking Status Former Smoker    Last attempt to quit: Replaced by [facility] Years since quittin 2   Smokeless Tobacco Never Used     Family History   Problem Relation Age of Onset    Colon cancer Mother     Other Mother         septicemia    Hypertension Father     Heart attack Father         NSTEMI    Hypertension Sister     Hypertension Brother          Current Medications: has a current medication list which includes the following prescription(s): aspirin low dose, atorvastatin, fluticasone, loratadine, lorazepam, and pantoprazole  Vital Signs: There were no vitals taken for this visit  Physical Exam:   Virtual visit, no physical exam          Labs:  Lab Results   Component Value Date    ALT 40 04/03/2019    AST 26 04/03/2019    BUN 15 04/03/2019    CALCIUM 9 6 12/12/2016     04/03/2019    CHOL 184 12/12/2016    CO2 27 04/03/2019    CREATININE 1 02 04/03/2019    HDL 41 04/03/2019    HCT 52 1 (H) 04/03/2019    HGB 17 2 04/03/2019    HGBA1C 5 8 (H) 04/03/2019    MG 2 1 04/27/2016     04/03/2019    K 4 4 04/03/2019    PSA 2 1 04/03/2019     12/12/2016    TRIG 103 04/03/2019    WBC 9 8 04/03/2019         X-Rays & Procedures:   No orders to display           ______________________________________________________________________      Assessment & Plan:     Diagnoses and all orders for this visit:    Gastroesophageal reflux disease without esophagitis    Fatty liver  -     Hepatic function panel; Future      Patient will be sent a copy of a low-carbohydrate diet  Repeat liver functions in 6 months time  He is taking Dexilant and will continue to do so  He will take over-the-counter Pepcid 20 mg as a rescue drug which he frequently needs at night  He will see me in 1 years time

## 2020-03-30 DIAGNOSIS — K21.9 GASTROESOPHAGEAL REFLUX DISEASE WITHOUT ESOPHAGITIS: Primary | ICD-10-CM

## 2020-03-30 RX ORDER — DEXLANSOPRAZOLE 60 MG/1
60 CAPSULE, DELAYED RELEASE ORAL DAILY
Qty: 90 CAPSULE | Refills: 3 | Status: SHIPPED | OUTPATIENT
Start: 2020-03-30 | End: 2021-04-22

## 2020-04-02 ENCOUNTER — TELEPHONE (OUTPATIENT)
Dept: GASTROENTEROLOGY | Facility: CLINIC | Age: 65
End: 2020-04-02

## 2020-04-10 DIAGNOSIS — E78.5 HYPERLIPIDEMIA, UNSPECIFIED HYPERLIPIDEMIA TYPE: ICD-10-CM

## 2020-04-10 RX ORDER — ATORVASTATIN CALCIUM 40 MG/1
TABLET, FILM COATED ORAL
Qty: 90 TABLET | Refills: 3 | Status: SHIPPED | OUTPATIENT
Start: 2020-04-10 | End: 2021-04-05

## 2020-09-22 ENCOUNTER — TELEMEDICINE (OUTPATIENT)
Dept: FAMILY MEDICINE CLINIC | Facility: CLINIC | Age: 65
End: 2020-09-22
Payer: COMMERCIAL

## 2020-09-22 DIAGNOSIS — Z01.84 IMMUNITY STATUS TESTING: Primary | ICD-10-CM

## 2020-09-22 PROCEDURE — 99441 PR PHYS/QHP TELEPHONE EVALUATION 5-10 MIN: CPT | Performed by: FAMILY MEDICINE

## 2020-10-26 ENCOUNTER — IMMUNIZATIONS (OUTPATIENT)
Dept: FAMILY MEDICINE CLINIC | Facility: CLINIC | Age: 65
End: 2020-10-26
Payer: COMMERCIAL

## 2020-10-26 DIAGNOSIS — Z23 FLU VACCINE NEED: Primary | ICD-10-CM

## 2020-10-26 PROCEDURE — 90471 IMMUNIZATION ADMIN: CPT

## 2020-10-26 PROCEDURE — 90682 RIV4 VACC RECOMBINANT DNA IM: CPT

## 2020-11-01 ENCOUNTER — NURSE TRIAGE (OUTPATIENT)
Dept: OTHER | Facility: OTHER | Age: 65
End: 2020-11-01

## 2020-11-02 ENCOUNTER — TELEMEDICINE (OUTPATIENT)
Dept: FAMILY MEDICINE CLINIC | Facility: CLINIC | Age: 65
End: 2020-11-02
Payer: COMMERCIAL

## 2020-11-02 DIAGNOSIS — J01.00 ACUTE NON-RECURRENT MAXILLARY SINUSITIS: Primary | ICD-10-CM

## 2020-11-02 DIAGNOSIS — R05.9 COUGH: ICD-10-CM

## 2020-11-02 PROBLEM — R10.13 EPIGASTRIC PAIN: Status: RESOLVED | Noted: 2018-05-10 | Resolved: 2020-11-02

## 2020-11-02 PROCEDURE — 99214 OFFICE O/P EST MOD 30 MIN: CPT | Performed by: FAMILY MEDICINE

## 2020-11-02 RX ORDER — AMOXICILLIN AND CLAVULANATE POTASSIUM 875; 125 MG/1; MG/1
1 TABLET, FILM COATED ORAL EVERY 12 HOURS SCHEDULED
Qty: 10 TABLET | Refills: 0 | Status: SHIPPED | OUTPATIENT
Start: 2020-11-02 | End: 2020-11-07

## 2020-11-02 RX ORDER — FLUTICASONE PROPIONATE 50 MCG
1 SPRAY, SUSPENSION (ML) NASAL DAILY
Qty: 1 BOTTLE | Refills: 1 | Status: SHIPPED | OUTPATIENT
Start: 2020-11-02 | End: 2021-01-12 | Stop reason: SDUPTHER

## 2020-11-09 DIAGNOSIS — Z01.84 IMMUNITY STATUS TESTING: ICD-10-CM

## 2020-11-09 PROCEDURE — U0003 INFECTIOUS AGENT DETECTION BY NUCLEIC ACID (DNA OR RNA); SEVERE ACUTE RESPIRATORY SYNDROME CORONAVIRUS 2 (SARS-COV-2) (CORONAVIRUS DISEASE [COVID-19]), AMPLIFIED PROBE TECHNIQUE, MAKING USE OF HIGH THROUGHPUT TECHNOLOGIES AS DESCRIBED BY CMS-2020-01-R: HCPCS | Performed by: FAMILY MEDICINE

## 2020-11-11 LAB — SARS-COV-2 RNA SPEC QL NAA+PROBE: NOT DETECTED

## 2021-01-12 DIAGNOSIS — R05.9 COUGH: ICD-10-CM

## 2021-01-12 RX ORDER — FLUTICASONE PROPIONATE 50 MCG
1 SPRAY, SUSPENSION (ML) NASAL DAILY
Qty: 2 BOTTLE | Refills: 3 | Status: SHIPPED | OUTPATIENT
Start: 2021-01-12 | End: 2021-04-22

## 2021-01-14 ENCOUNTER — TELEPHONE (OUTPATIENT)
Dept: GASTROENTEROLOGY | Facility: MEDICAL CENTER | Age: 66
End: 2021-01-14

## 2021-01-14 NOTE — TELEPHONE ENCOUNTER
Phone call from [facility] is not formulary and they would like a call back at [phone] to discuss alternatives     Ref # [id number]

## 2021-01-15 DIAGNOSIS — K21.9 GASTROESOPHAGEAL REFLUX DISEASE WITHOUT ESOPHAGITIS: Primary | ICD-10-CM

## 2021-01-15 RX ORDER — PANTOPRAZOLE SODIUM 40 MG/1
40 TABLET, DELAYED RELEASE ORAL EVERY MORNING
Qty: 90 TABLET | Refills: 3 | Status: SHIPPED | OUTPATIENT
Start: 2021-01-15 | End: 2021-12-16 | Stop reason: SDUPTHER

## 2021-01-15 NOTE — TELEPHONE ENCOUNTER
Please tell him they are the exact same class of drug  Dexilant is a 24 hour medication, and Protonix usually lasts about this long also  Nexium also makes 24 hour preparation    He is to try the Protonix and let me know his progress on it

## 2021-04-05 DIAGNOSIS — E78.5 HYPERLIPIDEMIA, UNSPECIFIED HYPERLIPIDEMIA TYPE: ICD-10-CM

## 2021-04-05 RX ORDER — ATORVASTATIN CALCIUM 40 MG/1
TABLET, FILM COATED ORAL
Qty: 90 TABLET | Refills: 3 | Status: SHIPPED | OUTPATIENT
Start: 2021-04-05 | End: 2022-05-02

## 2021-04-13 DIAGNOSIS — Z23 ENCOUNTER FOR IMMUNIZATION: ICD-10-CM

## 2021-04-19 ENCOUNTER — TELEPHONE (OUTPATIENT)
Dept: FAMILY MEDICINE CLINIC | Facility: CLINIC | Age: 66
End: 2021-04-19

## 2021-04-19 NOTE — TELEPHONE ENCOUNTER
Pt calls has palpitations has appt with you on Thursday would like to have labs drawn prior  Can you please order routine and whatever you feel he could need     Fyi: he is going for his 2nd [address] tomorrow,

## 2021-04-20 DIAGNOSIS — E78.5 HYPERLIPIDEMIA, UNSPECIFIED HYPERLIPIDEMIA TYPE: Primary | ICD-10-CM

## 2021-04-20 DIAGNOSIS — Z12.5 PROSTATE CANCER SCREENING: ICD-10-CM

## 2021-04-20 DIAGNOSIS — R73.01 IMPAIRED FASTING GLUCOSE: ICD-10-CM

## 2021-04-20 DIAGNOSIS — R53.83 FATIGUE, UNSPECIFIED TYPE: ICD-10-CM

## 2021-04-22 ENCOUNTER — OFFICE VISIT (OUTPATIENT)
Dept: FAMILY MEDICINE CLINIC | Facility: CLINIC | Age: 66
End: 2021-04-22
Payer: COMMERCIAL

## 2021-04-22 VITALS
SYSTOLIC BLOOD PRESSURE: 136 MMHG | WEIGHT: 225 LBS | DIASTOLIC BLOOD PRESSURE: 78 MMHG | OXYGEN SATURATION: 96 % | TEMPERATURE: 98.4 F | HEART RATE: 96 BPM | RESPIRATION RATE: 18 BRPM | BODY MASS INDEX: 32.21 KG/M2 | HEIGHT: 70 IN

## 2021-04-22 DIAGNOSIS — F41.9 ANXIETY: ICD-10-CM

## 2021-04-22 DIAGNOSIS — I49.1 PAC (PREMATURE ATRIAL CONTRACTION): ICD-10-CM

## 2021-04-22 DIAGNOSIS — R00.2 PALPITATION: Primary | ICD-10-CM

## 2021-04-22 PROBLEM — M54.6 ACUTE RIGHT-SIDED THORACIC BACK PAIN: Status: RESOLVED | Noted: 2018-12-04 | Resolved: 2021-04-22

## 2021-04-22 PROBLEM — Z01.84 IMMUNITY STATUS TESTING: Status: RESOLVED | Noted: 2020-09-22 | Resolved: 2021-04-22

## 2021-04-22 PROBLEM — J01.00 ACUTE NON-RECURRENT MAXILLARY SINUSITIS: Status: RESOLVED | Noted: 2020-11-02 | Resolved: 2021-04-22

## 2021-04-22 LAB
ALBUMIN SERPL-MCNC: 4.5 G/DL (ref 3.8–4.8)
ALBUMIN/GLOB SERPL: 2.5 {RATIO} (ref 1.2–2.2)
ALP SERPL-CCNC: 79 IU/L (ref 39–117)
ALT SERPL-CCNC: 36 IU/L (ref 0–44)
AST SERPL-CCNC: 23 IU/L (ref 0–40)
BASOPHILS # BLD AUTO: 0 X10E3/UL (ref 0–0.2)
BASOPHILS NFR BLD AUTO: 0 %
BILIRUB SERPL-MCNC: 1 MG/DL (ref 0–1.2)
BUN SERPL-MCNC: 13 MG/DL (ref 8–27)
BUN/CREAT SERPL: 11 (ref 10–24)
CALCIUM SERPL-MCNC: 9.4 MG/DL (ref 8.6–10.2)
CHLORIDE SERPL-SCNC: 103 MMOL/L (ref 96–106)
CHOLEST SERPL-MCNC: 171 MG/DL (ref 100–199)
CO2 SERPL-SCNC: 28 MMOL/L (ref 20–29)
CREAT SERPL-MCNC: 1.19 MG/DL (ref 0.76–1.27)
EOSINOPHIL # BLD AUTO: 0.4 X10E3/UL (ref 0–0.4)
EOSINOPHIL NFR BLD AUTO: 4 %
ERYTHROCYTE [DISTWIDTH] IN BLOOD BY AUTOMATED COUNT: 12.6 % (ref 11.6–15.4)
GLOBULIN SER-MCNC: 1.8 G/DL (ref 1.5–4.5)
GLUCOSE SERPL-MCNC: 113 MG/DL (ref 65–99)
HBA1C MFR BLD: 6.1 % (ref 4.8–5.6)
HCT VFR BLD AUTO: 51.2 % (ref 37.5–51)
HDLC SERPL-MCNC: 44 MG/DL
HGB BLD-MCNC: 17.2 G/DL (ref 13–17.7)
IMM GRANULOCYTES # BLD: 0 X10E3/UL (ref 0–0.1)
IMM GRANULOCYTES NFR BLD: 0 %
LDLC SERPL CALC-MCNC: 106 MG/DL (ref 0–99)
LYMPHOCYTES # BLD AUTO: 1.1 X10E3/UL (ref 0.7–3.1)
LYMPHOCYTES NFR BLD AUTO: 12 %
MCH RBC QN AUTO: 30.3 PG (ref 26.6–33)
MCHC RBC AUTO-ENTMCNC: 33.6 G/DL (ref 31.5–35.7)
MCV RBC AUTO: 90 FL (ref 79–97)
MONOCYTES # BLD AUTO: 1 X10E3/UL (ref 0.1–0.9)
MONOCYTES NFR BLD AUTO: 11 %
NEUTROPHILS # BLD AUTO: 7 X10E3/UL (ref 1.4–7)
NEUTROPHILS NFR BLD AUTO: 73 %
PLATELET # BLD AUTO: 233 X10E3/UL (ref 150–450)
POTASSIUM SERPL-SCNC: 4.9 MMOL/L (ref 3.5–5.2)
PROT SERPL-MCNC: 6.3 G/DL (ref 6–8.5)
PSA SERPL-MCNC: 1.9 NG/ML (ref 0–4)
RBC # BLD AUTO: 5.68 X10E6/UL (ref 4.14–5.8)
SL AMB EGFR AFRICAN AMERICAN: 74 ML/MIN/1.73
SL AMB EGFR NON AFRICAN AMERICAN: 64 ML/MIN/1.73
SL AMB REFLEX CRITERIA: NORMAL
SL AMB VLDL CHOLESTEROL CALC: 21 MG/DL (ref 5–40)
SODIUM SERPL-SCNC: 142 MMOL/L (ref 134–144)
TRIGL SERPL-MCNC: 117 MG/DL (ref 0–149)
WBC # BLD AUTO: 9.6 X10E3/UL (ref 3.4–10.8)

## 2021-04-22 PROCEDURE — 99214 OFFICE O/P EST MOD 30 MIN: CPT | Performed by: FAMILY MEDICINE

## 2021-04-22 PROCEDURE — 93000 ELECTROCARDIOGRAM COMPLETE: CPT | Performed by: FAMILY MEDICINE

## 2021-04-22 RX ORDER — CARVEDILOL 6.25 MG/1
6.25 TABLET ORAL 2 TIMES DAILY WITH MEALS
Qty: 60 TABLET | Refills: 1 | Status: SHIPPED | OUTPATIENT
Start: 2021-04-22 | End: 2021-05-26 | Stop reason: ALTCHOICE

## 2021-04-22 RX ORDER — ALPRAZOLAM 0.25 MG/1
0.25 TABLET ORAL DAILY PRN
Qty: 30 TABLET | Refills: 2 | Status: SHIPPED | OUTPATIENT
Start: 2021-04-22 | End: 2022-04-25 | Stop reason: SDUPTHER

## 2021-04-22 NOTE — PROGRESS NOTES
BMI Counseling: Body mass index is 32 28 kg/m²  The BMI is above normal  Nutrition recommendations include reducing portion sizes and decreasing overall calorie intake  Exercise recommendations include moderate aerobic physical activity for 150 minutes/week

## 2021-04-22 NOTE — PROGRESS NOTES
Assessment/Plan:    No problem-specific Assessment & Plan notes found for this encounter  Diagnoses and all orders for this visit:    Palpitation  -     POCT ECG, occasional PAC's  After discussing risks and benefits of medication along with side effects will start the following:  -     carvedilol (COREG) 6 25 mg tablet; Take 1 tablet (6 25 mg total) by mouth 2 (two) times a day with meals  Has an appt with Dr Donya Olmedo in 1 month    PAC (premature atrial contraction)  -     carvedilol (COREG) 6 25 mg tablet; Take 1 tablet (6 25 mg total) by mouth 2 (two) times a day with meals    Anxiety  After discussing risks and benefits of medication along with side effects will start the following:  -     ALPRAZolam (XANAX) 0 25 mg tablet; Take 1 tablet (0 25 mg total) by mouth daily as needed for anxiety  Controlled substance agreement signed today  Follow up in 1 month        Subjective:      Patient ID: Katalina Jones is a 61 y o  male  Palpitations  Patient complains of palpitations  The symptoms are moderate, occur intermittently, and last several minutes per episode  They tend to occur while sitting or walking  Cardiac risk factors include: advanced age (older than 54 for men, 72 for women), hypertension, male gender and obesity (BMI >= 30 kg/m2)  Aggravating factors: stress/anxiety  He and his wife currently have their grandkids living with them  Alleviating factors: spontaneous  Associated symptoms: rapid heart beat  Patient denies: abdominal pain, chest pain and shortness of breath  The following portions of the patient's history were reviewed and updated as appropriate:   He  has a past medical history of GERD (gastroesophageal reflux disease), Hyperlipidemia, and Seasonal allergies    He   Patient Active Problem List    Diagnosis Date Noted    Palpitation 04/22/2021    PAC (premature atrial contraction) 04/22/2021    Memory difficulty 08/12/2019    Memory loss 07/12/2019    Anxiety 07/12/2019    Behavioral change 07/12/2019    Right hip pain 04/15/2019    Impaired fasting glucose 04/15/2019    Dyspepsia 05/15/2018    Serum total bilirubin elevated 05/15/2018    Gastroesophageal reflux disease without esophagitis 05/02/2018    Hyperlipidemia 05/02/2018    Prehypertension 05/02/2018    Diabetes mellitus screening 05/02/2018    Prostate cancer screening 05/02/2018     He  has a past surgical history that includes Cholecystectomy; Sinus surgery; EAR SURGERY; Colonoscopy (N/A, 10/4/2017); and pr esophagogastroduodenoscopy transoral diagnostic (N/A, 5/31/2018)  His family history includes Colon cancer in his mother; Heart attack in his father; Hypertension in his brother, father, and sister; Other in his mother  He  reports that he quit smoking about 46 years ago  He has never used smokeless tobacco  He reports current alcohol use of about 3 0 standard drinks of alcohol per week  He reports that he does not use drugs  Current Outpatient Medications   Medication Sig Dispense Refill    ASPIRIN LOW DOSE 81 MG EC tablet TAKE 1 TABLET DAILY 90 tablet 3    atorvastatin (LIPITOR) 40 mg tablet TAKE 1 TABLET DAILY 90 tablet 3    pantoprazole (PROTONIX) 40 mg tablet Take 1 tablet (40 mg total) by mouth every morning 90 tablet 3    ALPRAZolam (XANAX) 0 25 mg tablet Take 1 tablet (0 25 mg total) by mouth daily as needed for anxiety 30 tablet 2    carvedilol (COREG) 6 25 mg tablet Take 1 tablet (6 25 mg total) by mouth 2 (two) times a day with meals 60 tablet 1     No current facility-administered medications for this visit        Current Outpatient Medications on File Prior to Visit   Medication Sig    ASPIRIN LOW DOSE 81 MG EC tablet TAKE 1 TABLET DAILY    atorvastatin (LIPITOR) 40 mg tablet TAKE 1 TABLET DAILY    pantoprazole (PROTONIX) 40 mg tablet Take 1 tablet (40 mg total) by mouth every morning    [DISCONTINUED] LORazepam (ATIVAN) 0 5 mg tablet Take 1 tablet (0 5 mg total) by mouth daily as needed for anxiety    [DISCONTINUED] dexlansoprazole (DEXILANT) 60 MG capsule Take 1 capsule (60 mg total) by mouth daily    [DISCONTINUED] fluticasone (FLONASE) 50 mcg/act nasal spray 1 spray into each nostril daily     No current facility-administered medications on file prior to visit  He has No Known Allergies       Review of Systems   Constitutional: Negative for activity change, appetite change, fatigue and fever  HENT: Negative for congestion and ear discharge  Respiratory: Negative for cough and shortness of breath  Cardiovascular: Positive for palpitations  Negative for chest pain  Gastrointestinal: Negative for diarrhea and nausea  Musculoskeletal: Negative for arthralgias and back pain  Skin: Negative for color change and rash  Neurological: Negative for dizziness and headaches  Psychiatric/Behavioral: Negative for agitation and behavioral problems  The patient is nervous/anxious and is hyperactive  Objective:      /78 (BP Location: Left arm, Patient Position: Sitting, Cuff Size: Adult)   Pulse 96   Temp 98 4 °F (36 9 °C)   Resp 18   Ht 5' 10" (1 778 m)   Wt 102 kg (225 lb)   SpO2 96%   BMI 32 28 kg/m²          Physical Exam  Constitutional:       General: He is not in acute distress  Appearance: He is well-developed  He is not diaphoretic  Eyes:      General: No scleral icterus  Pupils: Pupils are equal, round, and reactive to light  Cardiovascular:      Rate and Rhythm: Normal rate and regular rhythm  Heart sounds: Normal heart sounds  No murmur  Pulmonary:      Effort: Pulmonary effort is normal  No respiratory distress  Breath sounds: Normal breath sounds  No wheezing  Abdominal:      General: Bowel sounds are normal  There is no distension  Palpations: Abdomen is soft  Tenderness: There is no abdominal tenderness  Skin:     General: Skin is warm and dry  Findings: No rash     Neurological:      Mental Status: He is alert and oriented to person, place, and time

## 2021-04-26 ENCOUNTER — OFFICE VISIT (OUTPATIENT)
Dept: DERMATOLOGY | Facility: CLINIC | Age: 66
End: 2021-04-26
Payer: COMMERCIAL

## 2021-04-26 VITALS — BODY MASS INDEX: 32.57 KG/M2 | WEIGHT: 227 LBS | TEMPERATURE: 97.7 F

## 2021-04-26 DIAGNOSIS — D48.5 NEOPLASM OF UNCERTAIN BEHAVIOR OF SKIN: Primary | ICD-10-CM

## 2021-04-26 PROCEDURE — 11103 TANGNTL BX SKIN EA SEP/ADDL: CPT | Performed by: DERMATOLOGY

## 2021-04-26 PROCEDURE — 88305 TISSUE EXAM BY PATHOLOGIST: CPT | Performed by: STUDENT IN AN ORGANIZED HEALTH CARE EDUCATION/TRAINING PROGRAM

## 2021-04-26 PROCEDURE — 99204 OFFICE O/P NEW MOD 45 MIN: CPT | Performed by: DERMATOLOGY

## 2021-04-26 PROCEDURE — 11102 TANGNTL BX SKIN SINGLE LES: CPT | Performed by: DERMATOLOGY

## 2021-04-26 NOTE — PATIENT INSTRUCTIONS
SKIN SHAVE BIOPSY  Rationale for Procedure  A skin shave biopsy allows the dermatologist to further examine a lesion or rash under the microscope to potentially obtain a more specific diagnosis  It usually involves numbing the area with numbing medication and removing a small piece of skin  Usually, the area will be closed with sutures at the end of the procedure  Sutures are not usually needed  Description of Procedure  We would like to perform a skin shave biopsy today  A local anesthetic, similar to the kind that a dentist uses when filling a cavity, will be injected with a very small needle into the skin area to be sampled  The injected skin and tissue underneath should go to sleep and become numbed so that no further pain should be felt  An instrument shaped like a tiny "razor blade" (i e , the shave biopsy instrument) will be used to cut a small round piece of skin and tissue from the area so that the sample may be taken and examined more closely under the microscope  A slight amount of bleeding will occur, but it is usually stopped with direct pressure, chemical cautery, and/or a pressure bandage; rarely, electrocautery and other means of intervention may be necessary to help stop the bleeding  Sutures are not usually needed  Surgical Vaseline-type ointment will also applied after the procedure to help create a barrier between the wound and the outside world      Risks and Potential Complications  While the advantage of a skin shave biopsy is that it allows us to potentially examine the skin more closely under the microscope, there are some risks and potential complications that include but are not limited to the following:  - Bleeding  - Infection  - Pain  - Scar/keloid  - Skin discoloration  - Incomplete removal of the lesion or rash being sampled (in other words, this procedure is intended as a sampling and is not considered a definitive treatment)  - Recurrence of the lesion or rash being sampled  - Nerve Damage/Numbness/Loss of Function  - Allergic Reaction to Anesthesia  - Biopsies are diagnostic procedures and, based on findings, additional treatment or evaluation may be required (in other words, this procedure is intended as a sampling and is not considered a definitive treatment)  - Loss or destruction of the sample specimen could result in no additional findings  - The person at the microscope may not be able to provide additional information other than what we already know or suspect  What You Will Need to Do After the Procedure  1  Keep the area clean and dry  Try NOT to remove the bandage for the first 24 hours  2  Gently clean the area and apply Vaseline ointment (this is over the counter and not a prescription) to the biopsy site for up to 2 weeks  3  Generally, sutures are not needed  If any sutures were placed, return for suture removal as instructed (generally 1 week for the face, 2 weeks for the body)  4  Take Acetaminophen (Tylenol) for discomfort, if no contraindications  Do NOT take Ibuprofen or aspirin unless specifically told to do so by your Dermatologist because these medications can make bleeding worse  5  Call our office immediately for signs of infection: fever, chills, increased redness, warmth, tenderness, discomfort/pain, or pus or foul smell coming from the wound  If a small amount of bleeding is noticed, place a clean cloth over the area and apply firm pressure for ten minutes  Check the wound ONLY after 10 minutes of direct pressure; do not cheat and sneak a peak, as that does not count  If bleeding persists after 10 minutes of legitimate direct pressure, then try one more round of direct pressure for an additional 10 minutes to the area  Should the bleeding become heavier or not stop after the second attempt, call [facility] directly at [phone] (SKIN) or, if after hours, go to your local Emergency Room/Emergency Department

## 2021-04-26 NOTE — PROGRESS NOTES
[facility] Note     Patient Name: Xu Chung  Encounter Date: 04/26/2021     Have you been cared for by a [facility]ologist in the last 3 years and, if so, which one? YES, Dr Johnston    · Have you traveled outside of the [facility] in the past 3 months or outside of the [facility] area in the last 2 weeks? No     May we call your Preferred Phone number to discuss your specific medical information? Yes     May we leave a detailed message that includes your specific medical information? Yes      Today's Chief Concerns:   Concern #1:  Lesion on left neck   Concern #2:  Bump on chest    Past Medical History:  Have you personally ever had or currently have any of the following? · Skin cancer (such as Melanoma, Basal Cell Carcinoma, Squamous Cell Carcinoma? (If Yes, please provide more detail)- No  · Eczema: No  · Psoriasis: No  · HIV/AIDS: No  · Hepatitis B or C: No  · Tuberculosis: No  · Systemic Immunosuppression such as Diabetes, Biologic or Immunotherapy, Chemotherapy, Organ Transplantation, Bone Marrow Transplantation (If YES, please provide more detail): No  · Radiation Treatment (If YES, please provide more detail): No  · Any other major medical conditions/concerns? 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 Family History:  Have any of your "first degree relatives" (parent, brother, sister, or child) had any of the following       · Skin cancer such as Melanoma or Merkel Cell Carcinoma or Pancreatic Cancer? YES, brother basal cell-nose and sister-basal cell back  · Multiple nevi:sister,father, mother  · Eczema, Asthma, Hay Fever or Seasonal Allergies: No  · Psoriasis or Psoriatic Arthritis: No  · Do any other medical conditions seem to run in your family? If Yes, what condition and which relatives?   YES, colon cancer-mother, hypertension, father    Current Medications:   (please update all dermatological medications before printing patient's AVS!)      Current Outpatient Medications:     ALPRAZolam (XANAX) 0 25 mg tablet, Take 1 tablet (0 25 mg total) by mouth daily as needed for anxiety, Disp: 30 tablet, Rfl: 2    ASPIRIN LOW DOSE 81 MG EC tablet, TAKE 1 TABLET DAILY, Disp: 90 tablet, Rfl: 3    atorvastatin (LIPITOR) 40 mg tablet, TAKE 1 TABLET DAILY, Disp: 90 tablet, Rfl: 3    carvedilol (COREG) 6 25 mg tablet, Take 1 tablet (6 25 mg total) by mouth 2 (two) times a day with meals, Disp: 60 tablet, Rfl: 1    pantoprazole (PROTONIX) 40 mg tablet, Take 1 tablet (40 mg total) by mouth every morning, Disp: 90 tablet, Rfl: 3      Review of Systems:  Have you recently had or currently have any of the following? If YES, what are you doing for the problem? · Fever, chills or unintended weight loss: No  · Sudden loss or change in your vision: No  · Nausea, vomiting or blood in your stool: No  · Painful or swollen joints: No  · Wheezing or cough: No  · Changing mole or non-healing wound: No  · Nosebleeds: No  · Excessive sweating: No  · Easy or prolonged bleeding? No  · Over the last 2 weeks, how often have you been bothered by the following problems? · Taking little interest or pleasure in doing things: 1 - Not at All  · Feeling down, depressed, or hopeless: 1 - Not at All  · Rapid heartbeat with epinephrine:  No    · FEMALES ONLY:    · Are you pregnant or planning to become pregnant? N/A  · Are you currently or planning to be nursing or breast feeding? N/A    · Any known allergies? No Known Allergies      Physical Exam:     Was a chaperone (Derm Clinical Assistant) present throughout the entire Physical Exam? Yes     Did the Dermatology Team specifically  the patient on the importance of a Full Skin Exam to be sure that nothing is missed clinically?  Yes}  o Did the patient ultimately request or accept a Full Skin Exam?  NO  o Did the patient specifically refuse to have the areas "under-the-bra" examined by the Dermatologist? Keshav Lantigua  o Did the patient specifically refuse to have the areas "under-the-underwear" examined by the Dermatologist? YES    CONSTITUTIONAL:   Vitals:    04/26/21 1411   Temp: 97 7 °F (36 5 °C)   Weight: 103 kg (227 lb)         PSYCH: Normal mood and affect  EYES: Normal conjunctiva  ENT: Normal lips and oral mucosa  CARDIOVASCULAR: No edema  RESPIRATORY: Normal respirations  HEME/LYMPH/IMMUNO:  No regional lymphadenopathy except as noted below in "ASSESSMENT AND PLAN BY DIAGNOSIS"    SKIN:  FULL ORGAN SYSTEM EXAM    Face    Neck Viewed areas Normal except as noted below in Assessment   Right Arm/Hand/Fingers    Left Arm/Hand/Fingers    Chest Viewed areas Normal except as noted below in Assessment/has Holter monitor    Abdomen, Umbilicus    Back/Spine    Groin/Genitalia/Buttocks    Right Leg, Foot, Toes    Left Leg, Foot, Toes         Assessment and Plan by Diagnosis:    History of Present Condition:     Duration:  How long has this been an issue for you?    o  1 year   Location Affected:  Where on the body is this affecting you?    o  left neck   Quality:  Is there any bleeding, pain, itch, burning/irritation, or redness associated with the skin lesion? o  bleeding   Severity:  Describe any bleeding, pain, itch, burning/irritation, or redness on a scale of 1 to 10 (with 10 being the worst)  o  0   Timing:  Does this condition seem to be there pretty constantly or do you notice it more at specific times throughout the day?     o  consistent    Context:  Have you ever noticed that this condition seems to be associated with specific activities you do?    o  denies   Modifying Factors:    o Anything that seems to make the condition worse?    -  denies  o What have you tried to do to make the condition better?    -  denies   Associated Signs and Symptoms:  Does this skin lesion seem to be associated with any of the following:  o  SL AMB DERM SIGNS AND SYMPTOMS: Redness   NEOPLASM OF UNCERTAIN BEHAVIOR OF SKIN    Physical Exam:   (Anatomic Location); (Size and Morphological Description); (Differential Diagnosis):  o A:Left chest, 0 8 x 0 8 cm ;skin color papule Rule out:atypical nevi   o B:Left neck,0 8 x 0 5; pink scaly papule Basal cell carcinoma versus actinic keratosis   Pertinent Positives:   Pertinent Negatives: Additional History of Present Condition:  Patient states the lesion on chest does get caught and become irritated  Patient lesion on left neck does get irritated when he shaves near it  Patient does have a history of atypical nevi  Patient does see Dr Johnston  Assessment and Plan:   I have discussed with the patient that a sample of skin via a "skin biopsy would be potentially helpful to further make a specific diagnosis under the microscope   Based on a thorough discussion of this condition and the management approach to it (including a comprehensive discussion of the known risks, side effects and potential benefits of treatment), the patient (family) agrees to implement the following specific plan:    o Procedure:  Skin Biopsy  After a thorough discussion of treatment options and risk/benefits/alternatives (including but not limited to local pain, scarring, dyspigmentation, blistering, possible superinfection, and inability to confirm a diagnosis via histopathology), verbal and written consent were obtained and portion of the rash was biopsied for tissue sample  See below for consent that was obtained from patient and subsequent Procedure Note      PROCEDURE SHAVE BIOPSY NOTE:A     Performing Physician: Shima Patel Anatomic Location; Clinical Description with size (cm); Pre-Op Diagnosis:   o A:Left chest, 0 8 x 0 8 cm ;skin color papule Rule out:atypical nevi    Post-op diagnosis: Same      Local anesthesia: 1% xylocaine with epi       Topical anesthesia: None     Hemostasis: Aluminum chloride     PROCEDURE SHAVE BIOPSY NOTE:B     Performing Physician: Sunita Prado   Anatomic Location; Clinical Description with size (cm); Pre-Op Diagnosis:   o B:Left neck,0 8 x 0 5; pink scaly papule Basal cell carcinoma versus actinic keratosis   Post-op diagnosis: Same      Local anesthesia: 1% xylocaine with epi       Topical anesthesia: None     Hemostasis: Aluminum chloride       After obtaining informed consent  at which time there was a discussion about the purpose of biopsy  and low risks of infection and bleeding  The area was prepped and draped in the usual fashion  Anesthesia was obtained with 1% lidocaine with epinephrine  A shave biopsy to an appropriate sampling depth was obtained with a sterile blade (such as a 15-blade or DermaBlade)  The resulting wound was covered with surgical ointment and bandaged appropriately  The patient tolerated the procedure well without complications and was without signs of functional compromise  Specimen has been sent for review by Dermatopathology  Standard post-procedure care has been explained and has been included in written form within the patient's copy of Informed Consent  INFORMED CONSENT DISCUSSION AND POST-OPERATIVE INSTRUCTIONS FOR PATIENT    I   RATIONALE FOR PROCEDURE  I understand that a skin biopsy allows the Dermatologist to test a lesion or rash under the microscope to obtain a diagnosis  It usually involves numbing the area with numbing medication and removing a small piece of skin; sometimes the area will be closed with sutures  In this specific procedure, sutures are not usually needed  If any sutures are placed, then they are usually need to be removed in 2 weeks or less  I understand that my Dermatologist recommends that a skin "shave" biopsy be performed today    A local anesthetic, similar to the kind that a dentist uses when filling a cavity, will be injected with a very small needle into the skin area to be sampled  The injected skin and tissue underneath "will go to sleep and become numb so no pain should be felt afterwards  An instrument shaped like a tiny "razor blade" (shave biopsy instrument) will be used to cut a small piece of tissue and skin from the area so that a sample of tissue can be taken and examined more closely under the microscope  A slight amount of bleeding will occur, but it will be stopped with direct pressure and a pressure bandage and any other appropriate methods  I understands that a scar will form where the wound was created  Surgical ointment will be applied to help protect the wound  Sutures are not usually needed  II   RISKS AND POTENTIAL COMPLICATIONS   I understand the risks and potential complications of a skin biopsy include but are not limited to the following:   Bleeding   Infection   Pain   Scar/keloid   Skin discoloration   Incomplete Removal   Recurrence   Nerve Damage/Numbness/Loss of Function   Allergic Reaction to Anesthesia   Biopsies are diagnostic procedures and based on findings additional treatment or evaluation may be required   Loss or destruction of specimen resulting in no additional findings    My Dermatologist has explained to me the nature of the condition, the nature of the procedure, and the benefits to be reasonably expected compared with alternative approaches  My Dermatologist has discussed the likelihood of major risks or complications of this procedure including the specific risks listed above, such as bleeding, infection, and scarring/keloid  I understand that a scar is expected after this procedure  I understand that my physician cannot predict if the scar will form a "keloid," which extends beyond the borders of the wound that is created  A keloid is a thick, painful, and bumpy scar    A keloid can be difficult to treat, as it does not always respond well to therapy, which includes injecting cortisone directly into the keloid every few weeks  While this usually reduces the pain and size of the scar, it does not eliminate it  I understand that photographs may be taken before and after the procedure  These will be maintained as part of the medical providers confidential records and may not be made available to me  I further authorize the medical provider to use the photographs for teaching purposes or to illustrate scientific papers, books, or lectures if in his/her judgment, medical research, education, or science may benefit from its use  I have had an opportunity to fully inquire about the risks and benefits of this procedure and its alternatives  I have been given ample time and opportunity to ask questions and to seek a second opinion if I wished to do so  I acknowledge that there have specifically been no guarantees as to the cosmetic results from the procedure  I am aware that with any procedure there is always the possibility of an unexpected complication  III  POST-PROCEDURAL CARE (WHAT YOU WILL NEED TO DO "AFTER THE BIOPSY" TO OPTIMIZE HEALING)     Keep the area clean and dry  Try NOT to remove the bandage or get it wet for the first 24 hours   Gently clean the area and apply surgical ointment (such as Vaseline petrolatum ointment, which is available "over the counter" and not a prescription) to the biopsy site for up to 2 weeks straight  This acts to protect the wound from the outside world   Sutures are not usually placed in this procedure  If any sutures were placed, return for suture removal as instructed (generally 1 week for the face, 2 weeks for the body)   Take Acetaminophen (Tylenol) for discomfort, if no contraindications  Ibuprofen or aspirin could make bleeding worse   Call our office immediately for signs of infection: fever, chills, increased redness, warmth, tenderness, discomfort/pain, or pus or foul smell coming from the wound      WHAT TO DO IF THERE IS ANY BLEEDING? If a small amount of bleeding is noticed, place a clean cloth over the area and apply firm pressure for ten minutes  Check the wound after 10 minutes of direct pressure  If bleeding persists, try one more time for an additional 10 minutes of direct pressure on the area  If the bleeding becomes heavier or does not stop after the second attempt, or if you have any other questions about this procedure, then please call your [facility] Dermatologist by calling [phone] (SKIN)  I hereby acknowledge that I have reviewed and verified the site with my Dermatologist and have requested and authorized my Dermatologist to proceed with the procedure  R/O BCC on left neck, shave obtained, well tolerated, will contact with final results  R/O neuroma vs irritated nevus on left chest, shave obtained, well tolerated, will contact with final results      Scribe Attestation    I,:  [employer] am acting as a scribe while in the presence of the attending physician :       I,:  Kamaljit Pearl MD personally performed the services described in this documentation    as scribed in my presence :

## 2021-04-27 ENCOUNTER — CLINICAL SUPPORT (OUTPATIENT)
Dept: FAMILY MEDICINE CLINIC | Facility: CLINIC | Age: 66
End: 2021-04-27
Payer: COMMERCIAL

## 2021-04-27 DIAGNOSIS — R00.2 PALPITATION: ICD-10-CM

## 2021-04-27 PROCEDURE — 93244 EXT ECG>48HR<7D REV&INTERPJ: CPT | Performed by: FAMILY MEDICINE

## 2021-04-29 ENCOUNTER — TELEPHONE (OUTPATIENT)
Dept: DERMATOLOGY | Facility: CLINIC | Age: 66
End: 2021-04-29

## 2021-04-29 NOTE — TELEPHONE ENCOUNTER
Pre- operative Mohs Telephone Scheduling Note    Do you have a pacemaker or defibrillator? no    Do you take antibiotics before skin or dental procedures? no  If yes, will likely require pre-operative antibiotics  Ask  the patient why they take the antibiotics (usually because of joint replacement)  Do you have a history of a joint replacements within the past 2 years? no   If yes, will likely require pre-operative antibiotics  Ask if orthopaedic surgeon has prescribed pre-operative antibiotics to take before procedures/dental work? Do you take any OTC medications that thin your blood (Aspirin, Aleve, Ibuprofen) or supplements that thin your blood (fish oil, garlic, vitamin E, Ginko Biloba)? yes: Aspirin 81mg    Do you take any prescribed medications that thin your blood (Coumadin, Plavix, Xarelto, Eliquis or another prescribed blood thinner)? no    Do you have an allergy to lidocaine or epinephrine? no    Do you have an allergy to shellfish? no    Do you smoke? no      If yes,  patient to try and stop 2 days before surgery and 7 days after the surgery  Minimizing smoking as much as possible during this time will improve healing and the cosmetic result after surgery  Date scheduled: 05/04/2021 with Dr Itz Reyes of Care with other provider (Ynes Garcia, ENT) required? no   IF YES, PLEASE FORWARD TO APPROPRIATE PERSONNEL TO HELP COORDINATE  Are there remaining tumors to be scheduled?  no

## 2021-05-04 ENCOUNTER — PROCEDURE VISIT (OUTPATIENT)
Dept: DERMATOLOGY | Facility: CLINIC | Age: 66
End: 2021-05-04
Payer: COMMERCIAL

## 2021-05-04 VITALS
TEMPERATURE: 97.5 F | DIASTOLIC BLOOD PRESSURE: 60 MMHG | HEIGHT: 70 IN | RESPIRATION RATE: 14 BRPM | WEIGHT: 225 LBS | BODY MASS INDEX: 32.21 KG/M2 | HEART RATE: 77 BPM | SYSTOLIC BLOOD PRESSURE: 120 MMHG

## 2021-05-04 DIAGNOSIS — D04.4 SQUAMOUS CELL CARCINOMA IN SITU (SCCIS) OF SKIN OF NECK: Primary | ICD-10-CM

## 2021-05-04 PROCEDURE — 12041 INTMD RPR N-HF/GENIT 2.5CM/<: CPT | Performed by: DERMATOLOGY

## 2021-05-04 PROCEDURE — 17311 MOHS 1 STAGE H/N/HF/G: CPT | Performed by: DERMATOLOGY

## 2021-05-04 NOTE — PATIENT INSTRUCTIONS
Mohs Microscopic Surgery After Care    WOUND CARE AFTER SURGERY:    1  Do NOT to remove the pressure bandage for 48 hours  Keep the area clean and dry while this bandage is on  2  After removing the bandage for the first time, gently clean the area with soap and water  If the bandage is difficult to remove, getting the bandage wet in the shower will sometimes help soften the adhesive and allow it to be removed more easily  3  You will now need to cleanse this area daily in the shower with gentle soap  There is no need to scrub the area  Apply plain Vaseline ointment (this is over the counter and not a prescription) to the site followed by a clean appropriately sized bandage to area  Non stick dressing and paper tape (or Hypafix) are recommended for sensitive skin but a bandaid is fine if it covers the area well  4  You will need to continue the above daily wound care until you return for suture removal in 14 days (generally 7 days for the face, 10-14 days off the face)      RESTRICTIONS:     For two DAYS:   - You will need to take it very easy as this time is highest risk for bleeding  Being a "couch potato" during these two days is generally recommended  - For surgeries on the face/neck/scalp: Avoid leaning down to pick things up off the floor as this brings blood up to your head  Instead, squat down to pick things up  For two WEEKS:   - No heavy lifting (anything greater than 10 pounds)   - You can start to do slow, gentle activities such as slow walking but nothing to increase your heart rate and blood pressure too much (such as cardiovascular exercise)  It is important to take it easy as there is still a risk for bleeding and a high risk popping of stitches open during this time  If we did surgery near the eyes (including the nose, forehead, front part of your scalp, cheeks): It is VERY common to get a large amount of swelling around your eyes (puffy eyes)   Although less frequent, this can be enough to swell your eyes shut and can also come along with bruising  This should not hurt and is very expected and normal  It is typically worst at ~ 3 days out from your surgery and dramatically better 1 week post-operatively  MANAGING YOUR PAIN AFTER SURGERY     You can expect to have some pain after surgery  This is normal  The pain is typically worse the first two days after surgery, and quickly begins to get better  The best strategy for controlling your pain after surgery is around the clock pain control  You can take over the counter Acetaminophen (Tylenol) for discomfort, if no contraindications  If you are taking this at the maximum dose, you can alternate this with Motrin (ibuprofen or Advil) as well  Alternating these medications with each other allows you to maximize your pain control  In addition to Tylenol and Motrin, you can use heating pads or ice packs on your incisions to help reduce your pain  How will I alternate your regular strength over-the-counter pain medication? You will take a dose of pain medication every three hours   Start by taking 650 mg of Tylenol (2 pills of 325 mg)   3 hours later take 600 mg of Motrin (3 pills of 200 mg)   3 hours after taking the Motrin take 650 mg of Tylenol   3 hours after that take 600 mg of Motrin  See example - if your first dose of Tylenol is at 12:00 PM     12:00 PM  Tylenol 650 mg (2 pills of 325 mg)    3:00 PM  Motrin 600 mg (3 pills of 200 mg)    6:00 PM  Tylenol 650 mg (2 pills of 325 mg)    9:00 PM  Motrin 600 mg (3 pills of 200 mg)    Continue alternating every 3 hours      Important:   Do not take more than 4000mg of Tylenol or 3200mg of Motrin in a 24-hour period  What if I still have pain? If you have pain that is not controlled with the over-the-counter pain medications (Tylenol and Motrin or Advil), don't hesitate to call our staff using the number provided   We will help make sure you are managing your pain in the best way possible, and if necessary, we can provide a prescription for additional pain medication  CALL OUR OFFICE IMMEDIATELY FOR ANY SIGNS OF INFECTION:    This includes fever, chills, increased redness, warmth, tenderness, severe discomfort/pain, or pus or foul smell coming from the wound  [facility] directly at [phone] (SKIN)    IF BLEEDING IS NOTICED:    Place a clean cloth over the area and apply firm pressure for thirty minutes  Check the wound ONLY after 30 minutes of direct pressure; do not cheat and sneak a peak, as that does not count  If bleeding persists after 30 minutes of legitimate direct pressure, then try one more round of direct pressure to the area  Should the bleeding become heavier or not stop after the second attempt, call [facility] directly at [phone] (SKIN)  Your call will get routed to the dermatology surgeon on call even after hours

## 2021-05-04 NOTE — PROGRESS NOTES
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      Past Medical History:   Diagnosis Date    Basal cell carcinoma     GERD (gastroesophageal reflux disease)     Hyperlipidemia     Seasonal allergies     Squamous cell skin cancer 2021    Left neck SCCIS       Past Surgical History:   Procedure Laterality Date    CHOLECYSTECTOMY      COLONOSCOPY N/A 10/4/2017    Procedure: COLONOSCOPY;  Surgeon: Rosanne Nolasco MD;  Location: [facility]; Service: Gastroenterology    EAR SURGERY      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)    MOHS SURGERY  2021    left neck     AK ESOPHAGOGASTRODUODENOSCOPY TRANSORAL DIAGNOSTIC N/A 2018    Procedure: ESOPHAGOGASTRODUODENOSCOPY (EGD); Surgeon: Rosanne Nolasco MD;  Location: [facility];   Service: Gastroenterology    SINUS SURGERY           Current Outpatient Medications:     ALPRAZolam (XANAX) 0 25 mg tablet, Take 1 tablet (0 25 mg total) by mouth daily as needed for anxiety, Disp: 30 tablet, Rfl: 2    ASPIRIN LOW DOSE 81 MG EC tablet, TAKE 1 TABLET DAILY, Disp: 90 tablet, Rfl: 3    atorvastatin (LIPITOR) 40 mg tablet, TAKE 1 TABLET DAILY, Disp: 90 tablet, Rfl: 3    carvedilol (COREG) 6 25 mg tablet, Take 1 tablet (6 25 mg total) by mouth 2 (two) times a day with meals, Disp: 60 tablet, Rfl: 1    pantoprazole (PROTONIX) 40 mg tablet, Take 1 tablet (40 mg total) by mouth every morning, Disp: 90 tablet, Rfl: 3    No Known Allergies    Physical Exam:   Vitals:    21 0920   BP: 120/60   Pulse: 77   Resp: 14   Temp: 97 5 °F (36 4 °C)     General: Awake, Alert, Oriented x 3, Mood and affect appropriate  Respiratory: Respirations even and unlabored  Cardiovascular: Peripheral pulses intact; no edema  Musculoskeletal Exam: N/A    Assessment: 0 8cm x 0 6cm pink scar, biopsy proven SCCIS     Plan: MOHS     MOHS Procedure Timeout      Most Recent Value   Timeout:  0924   Patient Identity Verified:  Yes   Correct Site Verified:  Yes   Correct Procedure Verified:  Yes          MOHS Diagnosis/Indication/Location/ID      Most Recent Value   Pathology Type  Squamous cell carcinoma   Anatomic Site  left neck   Indications for MOHS  tumor location   MOHS ID  [id number]          MOHS Site/Accession/Pre-Post      Most Recent Value   Original Site Identified (as submitted by referring clinician)  Photo   Biopsy Accession/Specimen # (as submitted by referring clincian)  [id number]   Pre-MOHS Size Length (cm)  0 8   Pre-MOHS Size Width (cm)  0 6   Post-MOHS Size-Length (cm)  1 5   Post MOHS Size-Width (cm)  1   Repair Type  Intermediate layered closure   Suture Type  Monocryl plus, Prolene   Monocryl Plus Suture Size  4   Prolene Suture Size  4   Final repair length (cm):  2 2   Anesthetic Used  1% Lidocaine with epinephrine          MOHS Tumor Stage 1 Information      Most Recent Value   Tissue Sections (blocks)  2   Microscopic Exam Section 1:  No tumor identified in section  Microscopic Exam Section 2:  No tumor identified in section  Tumor Clear After Stage I? Yes                    Patient identified, procedure verified, site identified and verified  Time out completed  Surgical removal of the lesion discussed with the patient (risks and benefits, including possibility of scarring, infection, recurrence or potential for further treatment)  I have specifically identified the site with the patient  I have discussed the fact that the patient will have a scar after the procedure regardless of granulation or repair with sutures  I have discussed that the repair options can range from granulation in some cases to linear or curvilinear closures to larger flaps or grafts    There are sometimes flaps or grafts used that require multiples stages of surgery and will not be completed today, rather be completed over a series of appointments  I have discussed that occasionally due to location, size or depth of the lesion I may recommend consultation with and transfer of care for further removal or the reconstruction to another provider such as ophthalmology surgery, plastic surgery, ENT surgery, or surgical oncology  There are cases in which other testing such as imaging with MRI or CT scan or testing of lymph nodes is recommended because of the nature/depth/location of tumor seen during the removal  There is a risk of injury to nerves causing temporary or permanent numbness or the inability to move muscles full such as the inability to lift eyebrows  Questions answered and verbal and written consent was obtained  The tumor qualifies for Mohs based on AUC criteria  Dr Pj Hinton served as the surgeon and pathologist during the procedure  SCCIS cleared with 1 stage of mohs and repaired with 2 2cm closure  Well tolerated  COVID precautions taken  S/R in 2 weeks      Scribe Attestation    I,:  Tena Rocha MA am acting as a scribe while in the presence of the attending physician :       I,:  Gabriella Hogan MD personally performed the services described in this documentation    as scribed in my presence :

## 2021-05-05 DIAGNOSIS — I48.0 PAROXYSMAL ATRIAL FIBRILLATION (HCC): Primary | ICD-10-CM

## 2021-05-13 ENCOUNTER — HOSPITAL ENCOUNTER (OUTPATIENT)
Dept: NON INVASIVE DIAGNOSTICS | Facility: CLINIC | Age: 66
Discharge: HOME | End: 2021-05-13
Payer: COMMERCIAL

## 2021-05-13 DIAGNOSIS — I48.0 PAROXYSMAL ATRIAL FIBRILLATION (HCC): ICD-10-CM

## 2021-05-13 PROCEDURE — 93306 TTE W/DOPPLER COMPLETE: CPT | Performed by: INTERNAL MEDICINE

## 2021-05-13 PROCEDURE — 93306 TTE W/DOPPLER COMPLETE: CPT

## 2021-05-18 ENCOUNTER — OFFICE VISIT (OUTPATIENT)
Dept: DERMATOLOGY | Facility: CLINIC | Age: 66
End: 2021-05-18
Payer: COMMERCIAL

## 2021-05-18 DIAGNOSIS — D48.9 NEOPLASM OF UNCERTAIN BEHAVIOR: Primary | ICD-10-CM

## 2021-05-18 DIAGNOSIS — Z48.02 ENCOUNTER FOR REMOVAL OF SUTURES: ICD-10-CM

## 2021-05-18 PROCEDURE — 11102 TANGNTL BX SKIN SINGLE LES: CPT | Performed by: DERMATOLOGY

## 2021-05-18 PROCEDURE — 88305 TISSUE EXAM BY PATHOLOGIST: CPT | Performed by: STUDENT IN AN ORGANIZED HEALTH CARE EDUCATION/TRAINING PROGRAM

## 2021-05-18 NOTE — PATIENT INSTRUCTIONS
INFORMED CONSENT DISCUSSION AND POST-OPERATIVE INSTRUCTIONS FOR PATIENT    I   RATIONALE FOR PROCEDURE  I understand that a skin biopsy allows the Dermatologist to test a lesion or rash under the microscope to obtain a diagnosis  It usually involves numbing the area with numbing medication and removing a small piece of skin; sometimes the area will be closed with sutures  In this specific procedure, sutures are not usually needed  If any sutures are placed, then they are usually need to be removed in 2 weeks or less  I understand that my Dermatologist recommends that a skin "shave" biopsy be performed today  A local anesthetic, similar to the kind that a dentist uses when filling a cavity, will be injected with a very small needle into the skin area to be sampled  The injected skin and tissue underneath "will go to sleep and become numb so no pain should be felt afterwards  An instrument shaped like a tiny "razor blade" (shave biopsy instrument) will be used to cut a small piece of tissue and skin from the area so that a sample of tissue can be taken and examined more closely under the microscope  A slight amount of bleeding will occur, but it will be stopped with direct pressure and a pressure bandage and any other appropriate methods  I understands that a scar will form where the wound was created  Surgical ointment will be applied to help protect the wound  Sutures are not usually needed      II   RISKS AND POTENTIAL COMPLICATIONS   I understand the risks and potential complications of a skin biopsy include but are not limited to the following:   Bleeding   Infection   Pain   Scar/keloid   Skin discoloration   Incomplete Removal   Recurrence   Nerve Damage/Numbness/Loss of Function   Allergic Reaction to Anesthesia   Biopsies are diagnostic procedures and based on findings additional treatment or evaluation may be required   Loss or destruction of specimen resulting in no additional findings    My Dermatologist has explained to me the nature of the condition, the nature of the procedure, and the benefits to be reasonably expected compared with alternative approaches  My Dermatologist has discussed the likelihood of major risks or complications of this procedure including the specific risks listed above, such as bleeding, infection, and scarring/keloid  I understand that a scar is expected after this procedure  I understand that my physician cannot predict if the scar will form a "keloid," which extends beyond the borders of the wound that is created  A keloid is a thick, painful, and bumpy scar  A keloid can be difficult to treat, as it does not always respond well to therapy, which includes injecting cortisone directly into the keloid every few weeks  While this usually reduces the pain and size of the scar, it does not eliminate it  I understand that photographs may be taken before and after the procedure  These will be maintained as part of the medical providers confidential records and may not be made available to me  I further authorize the medical provider to use the photographs for teaching purposes or to illustrate scientific papers, books, or lectures if in his/her judgment, medical research, education, or science may benefit from its use  I have had an opportunity to fully inquire about the risks and benefits of this procedure and its alternatives  I have been given ample time and opportunity to ask questions and to seek a second opinion if I wished to do so  I acknowledge that there have specifically been no guarantees as to the cosmetic results from the procedure  I am aware that with any procedure there is always the possibility of an unexpected complication  III  POST-PROCEDURAL CARE (WHAT YOU WILL NEED TO DO "AFTER THE BIOPSY" TO OPTIMIZE HEALING)     Keep the area clean and dry    Try NOT to remove the bandage or get it wet for the first 24 hours      Gently clean the area and apply surgical ointment (such as Vaseline petrolatum ointment, which is available "over the counter" and not a prescription) to the biopsy site for up to 2 weeks straight  This acts to protect the wound from the outside world   Sutures are not usually placed in this procedure  If any sutures were placed, return for suture removal as instructed (generally 1 week for the face, 2 weeks for the body)   Take Acetaminophen (Tylenol) for discomfort, if no contraindications  Ibuprofen or aspirin could make bleeding worse   Call our office immediately for signs of infection: fever, chills, increased redness, warmth, tenderness, discomfort/pain, or pus or foul smell coming from the wound  WHAT TO DO IF THERE IS ANY BLEEDING? If a small amount of bleeding is noticed, place a clean cloth over the area and apply firm pressure for ten minutes  Check the wound after 10 minutes of direct pressure  If bleeding persists, try one more time for an additional 10 minutes of direct pressure on the area  If the bleeding becomes heavier or does not stop after the second attempt, or if you have any other questions about this procedure, then please call your [facility]'s Dermatologist by calling [phone] (SKIN)  I hereby acknowledge that I have reviewed and verified the site with my Dermatologist and have requested and authorized my Dermatologist to proceed with the procedure

## 2021-05-18 NOTE — PROGRESS NOTES
Suture removal    Date/Time: 5/18/2021 11:53 AM  Performed by: Unique Bashir RN  Authorized by: Andrei Drake MD   Universal Protocol:  Consent: Verbal consent obtained  Written consent not obtained  Risks and benefits: risks, benefits and alternatives were discussed  Consent given by: patient  Patient understanding: patient states understanding of the procedure being performed  Patient consent: the patient's understanding of the procedure matches consent given  Procedure consent: procedure consent matches procedure scheduled  Relevant documents: relevant documents present and verified  Test results: test results available and properly labeled  Site marked: the operative site was not marked  Radiology Images displayed and confirmed  If images not available, report reviewed: imaging studies not available  Patient identity confirmed: verbally with patient        Patient location:  Clinic  Location:     Laterality:  Left    Location:  [address] location:  Neck  Procedure details: Tools used:  Suture removal kit    Wound appearance:  No sign(s) of infection, good wound healing and clean    Number of sutures removed:  1 (running suture)  Post-procedure details:     Post-removal:  No dressing applied    Patient tolerance of procedure: Tolerated well, no immediate complications  Comments:      Patient has full body exam scheduled with Dr Nancy Chavez on 6/7/21  NEOPLASM OF UNCERTAIN BEHAVIOR OF SKIN    Physical Exam:   (Anatomic Location); (Size and Morphological Description); (Differential Diagnosis):  o Left lateral shin; 0 8 cm x 0 5 cm scaly pink verrucous papule; DIF:  Rule Out Verrucous papule versus  Other       Additional History of Present Condition:  Patient states this has been present for a few months  Patient states it started as a scab, has fallen off, and has come back       Assessment and Plan:   I have discussed with the patient that a sample of skin via a "skin biopsy would be potentially helpful to further make a specific diagnosis under the microscope   Based on a thorough discussion of this condition and the management approach to it (including a comprehensive discussion of the known risks, side effects and potential benefits of treatment), the patient (family) agrees to implement the following specific plan:    o Procedure:  Skin Biopsy  After a thorough discussion of treatment options and risk/benefits/alternatives (including but not limited to local pain, scarring, dyspigmentation, blistering, possible superinfection, and inability to confirm a diagnosis via histopathology), verbal and written consent were obtained and portion of the rash was biopsied for tissue sample  See below for consent that was obtained from patient and subsequent Procedure Note  PROCEDURE SHAVE BIOPSY NOTE:     Performing Physician: Ej White   Anatomic Location; Clinical Description with size (cm); Pre-Op Diagnosis:   o Left lateral shin; 0 8 cm x 0 5 cm scaly pink verrucous papule; DIF:  Rule Out Verrucous papule versus  Other    Post-op diagnosis: Same      Local anesthesia: 1% xylocaine with epi       Topical anesthesia: None     Hemostasis: Aluminum chloride       After obtaining informed consent  at which time there was a discussion about the purpose of biopsy  and low risks of infection and bleeding  The area was prepped and draped in the usual fashion  Anesthesia was obtained with 1% lidocaine with epinephrine  A shave biopsy to an appropriate sampling depth was obtained with a sterile blade (such as a 15-blade or DermaBlade)  The resulting wound was covered with surgical ointment and bandaged appropriately  The patient tolerated the procedure well without complications and was without signs of functional compromise  Specimen has been sent for review by Dermatopathology      Standard post-procedure care has been explained and has been included in written form within the patient's copy of Informed Consent  INFORMED CONSENT DISCUSSION AND POST-OPERATIVE INSTRUCTIONS FOR PATIENT    I   RATIONALE FOR PROCEDURE  I understand that a skin biopsy allows the Dermatologist to test a lesion or rash under the microscope to obtain a diagnosis  It usually involves numbing the area with numbing medication and removing a small piece of skin; sometimes the area will be closed with sutures  In this specific procedure, sutures are not usually needed  If any sutures are placed, then they are usually need to be removed in 2 weeks or less  I understand that my Dermatologist recommends that a skin "shave" biopsy be performed today  A local anesthetic, similar to the kind that a dentist uses when filling a cavity, will be injected with a very small needle into the skin area to be sampled  The injected skin and tissue underneath "will go to sleep and become numb so no pain should be felt afterwards  An instrument shaped like a tiny "razor blade" (shave biopsy instrument) will be used to cut a small piece of tissue and skin from the area so that a sample of tissue can be taken and examined more closely under the microscope  A slight amount of bleeding will occur, but it will be stopped with direct pressure and a pressure bandage and any other appropriate methods  I understands that a scar will form where the wound was created  Surgical ointment will be applied to help protect the wound  Sutures are not usually needed      II   RISKS AND POTENTIAL COMPLICATIONS   I understand the risks and potential complications of a skin biopsy include but are not limited to the following:   Bleeding   Infection   Pain   Scar/keloid   Skin discoloration   Incomplete Removal   Recurrence   Nerve Damage/Numbness/Loss of Function   Allergic Reaction to Anesthesia   Biopsies are diagnostic procedures and based on findings additional treatment or evaluation may be required   Loss or destruction of specimen resulting in no additional findings    My Dermatologist has explained to me the nature of the condition, the nature of the procedure, and the benefits to be reasonably expected compared with alternative approaches  My Dermatologist has discussed the likelihood of major risks or complications of this procedure including the specific risks listed above, such as bleeding, infection, and scarring/keloid  I understand that a scar is expected after this procedure  I understand that my physician cannot predict if the scar will form a "keloid," which extends beyond the borders of the wound that is created  A keloid is a thick, painful, and bumpy scar  A keloid can be difficult to treat, as it does not always respond well to therapy, which includes injecting cortisone directly into the keloid every few weeks  While this usually reduces the pain and size of the scar, it does not eliminate it  I understand that photographs may be taken before and after the procedure  These will be maintained as part of the medical providers confidential records and may not be made available to me  I further authorize the medical provider to use the photographs for teaching purposes or to illustrate scientific papers, books, or lectures if in his/her judgment, medical research, education, or science may benefit from its use  I have had an opportunity to fully inquire about the risks and benefits of this procedure and its alternatives  I have been given ample time and opportunity to ask questions and to seek a second opinion if I wished to do so  I acknowledge that there have specifically been no guarantees as to the cosmetic results from the procedure  I am aware that with any procedure there is always the possibility of an unexpected complication  III  POST-PROCEDURAL CARE (WHAT YOU WILL NEED TO DO "AFTER THE BIOPSY" TO OPTIMIZE HEALING)     Keep the area clean and dry    Try NOT to remove the bandage or get it wet for the first 24 hours   Gently clean the area and apply surgical ointment (such as Vaseline petrolatum ointment, which is available "over the counter" and not a prescription) to the biopsy site for up to 2 weeks straight  This acts to protect the wound from the outside world   Sutures are not usually placed in this procedure  If any sutures were placed, return for suture removal as instructed (generally 1 week for the face, 2 weeks for the body)   Take Acetaminophen (Tylenol) for discomfort, if no contraindications  Ibuprofen or aspirin could make bleeding worse   Call our office immediately for signs of infection: fever, chills, increased redness, warmth, tenderness, discomfort/pain, or pus or foul smell coming from the wound  WHAT TO DO IF THERE IS ANY BLEEDING? If a small amount of bleeding is noticed, place a clean cloth over the area and apply firm pressure for ten minutes  Check the wound after 10 minutes of direct pressure  If bleeding persists, try one more time for an additional 10 minutes of direct pressure on the area  If the bleeding becomes heavier or does not stop after the second attempt, or if you have any other questions about this procedure, then please call your [facility]'s Dermatologist by calling [phone] (SKIN)  I hereby acknowledge that I have reviewed and verified the site with my Dermatologist and have requested and authorized my Dermatologist to proceed with the procedure  R/O SCC Vs SK vs wart left lateral shin, shave obtained, well tolerated  Will contact with final results  Well healing scar on neck, sutures removed    Scribe Attestation    I,:  Hugo Campuzano RN am acting as a scribe while in the presence of the attending physician :       I,:  Radha Zuleta MD personally performed the services described in this documentation    as scribed in my presence :

## 2021-05-26 ENCOUNTER — OFFICE VISIT (OUTPATIENT)
Dept: CARDIOLOGY CLINIC | Facility: CLINIC | Age: 66
End: 2021-05-26
Payer: COMMERCIAL

## 2021-05-26 VITALS
WEIGHT: 226 LBS | DIASTOLIC BLOOD PRESSURE: 82 MMHG | HEART RATE: 73 BPM | HEIGHT: 70 IN | SYSTOLIC BLOOD PRESSURE: 138 MMHG | OXYGEN SATURATION: 96 % | BODY MASS INDEX: 32.35 KG/M2

## 2021-05-26 DIAGNOSIS — E78.2 MIXED HYPERLIPIDEMIA: ICD-10-CM

## 2021-05-26 DIAGNOSIS — I49.1 PAC (PREMATURE ATRIAL CONTRACTION): ICD-10-CM

## 2021-05-26 DIAGNOSIS — R00.2 PALPITATION: Primary | ICD-10-CM

## 2021-05-26 DIAGNOSIS — I47.1 SVT (SUPRAVENTRICULAR TACHYCARDIA) (HCC): ICD-10-CM

## 2021-05-26 PROCEDURE — 99203 OFFICE O/P NEW LOW 30 MIN: CPT | Performed by: INTERNAL MEDICINE

## 2021-05-26 RX ORDER — METOPROLOL SUCCINATE 25 MG/1
25 TABLET, EXTENDED RELEASE ORAL DAILY
Qty: 30 TABLET | Refills: 5 | Status: SHIPPED | OUTPATIENT
Start: 2021-05-26 | End: 2021-07-01 | Stop reason: SDUPTHER

## 2021-05-26 NOTE — PROGRESS NOTES
Cardiology Consultation     Charissa Monday  [id number]  1955  [address] [place] [id number]      Patient presents for cardiology consultation and evaluation  Patient has been having symptoms of palpitations for the past few months  Patient was evaluated by primary care physician with the bio Tel monitor for few days as well as echocardiogram   Patient does not have any history of documented arrhythmias in the past   Patient also denies any history of coronary artery disease or MI  He states that he has been compliant with all his present medications  Patient is on beta-blockers as well as some statins for hyperlipidemia  Patient also has a tendency for anxiety  Patient denies any history of dizziness presyncope syncope  He states that he has been trying to lose weight  1  Palpitation  Stress test only, exercise    metoprolol succinate (TOPROL-XL) 25 mg 24 hr tablet   2  Mixed hyperlipidemia     3  PAC (premature atrial contraction)     4   SVT (supraventricular tachycardia) (HCC)  Stress test only, exercise    metoprolol succinate (TOPROL-XL) 25 mg 24 hr tablet     Patient Active Problem List   Diagnosis    Gastroesophageal reflux disease without esophagitis    Hyperlipidemia    Prehypertension    Diabetes mellitus screening    Prostate cancer screening    Dyspepsia    Serum total bilirubin elevated    Right hip pain    Impaired fasting glucose    Memory loss    Anxiety    Behavioral change    Memory difficulty    Palpitation    PAC (premature atrial contraction)     Past Medical History:   Diagnosis Date    Abnormal ECG 5/2021    Had fluttering of my heart    Basal cell carcinoma     GERD (gastroesophageal reflux disease)     Hyperlipidemia     Seasonal allergies     Squamous cell skin cancer 04/26/2021    Left neck SCCIS     Social History Socioeconomic History    Marital status: /Civil Union     Spouse name: Not on file    Number of children: Not on file    Years of education: Not on file    Highest education level: Not on file   Occupational History    Not on file   Social Needs    Financial resource strain: Not on file    Food insecurity     Worry: Not on file     Inability: Not on file    Transportation needs     Medical: Not on file     Non-medical: Not on file   Tobacco Use    Smoking status: Former Smoker     Packs/day: 1 00     Years: 3 00     Pack years: 3 00     Types: Cigarettes     Start date: 1970     Quit date:      Years since quittin 4    Smokeless tobacco: Never Used   Substance and Sexual Activity    Alcohol use:  Yes     Alcohol/week: 3 0 standard drinks     Types: 3 Glasses of wine per week    Drug use: No    Sexual activity: Yes     Partners: Female     Birth control/protection: Female Sterilization   Lifestyle    Physical activity     Days per week: Not on file     Minutes per session: Not on file    Stress: Not on file   Relationships    Social connections     Talks on phone: Not on file     Gets together: Not on file     Attends Orthodoxy service: Not on file     Active member of club or organization: Not on file     Attends meetings of clubs or organizations: Not on file     Relationship status: Not on file    Intimate partner violence     Fear of current or ex partner: Not on file     Emotionally abused: Not on file     Physically abused: Not on file     Forced sexual activity: Not on file   Other Topics Concern    Not on file   Social History Narrative    Not on file      Family History   Problem Relation Age of Onset    Colon cancer Mother     Other Mother         septicemia    Hypertension Father     Heart attack Father         NSTEMI    Hypertension Sister     Basal cell carcinoma Sister     Hypertension Brother         Has defibulator and pace maker     Past Surgical History:   Procedure Laterality Date    CHOLECYSTECTOMY      COLONOSCOPY N/A 10/4/2017    Procedure: COLONOSCOPY;  Surgeon: Lio Tai MD;  Location: [facility]; Service: Gastroenterology    EAR SURGERY      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)    MOHS SURGERY  05/04/2021    left neck     CA ESOPHAGOGASTRODUODENOSCOPY TRANSORAL DIAGNOSTIC N/A 5/31/2018    Procedure: ESOPHAGOGASTRODUODENOSCOPY (EGD); Surgeon: Lio Tai MD;  Location: [facility];   Service: Gastroenterology    SINUS SURGERY         Current Outpatient Medications:     ALPRAZolam (XANAX) 0 25 mg tablet, Take 1 tablet (0 25 mg total) by mouth daily as needed for anxiety, Disp: 30 tablet, Rfl: 2    ASPIRIN LOW DOSE 81 MG EC tablet, TAKE 1 TABLET DAILY, Disp: 90 tablet, Rfl: 3    atorvastatin (LIPITOR) 40 mg tablet, TAKE 1 TABLET DAILY, Disp: 90 tablet, Rfl: 3    pantoprazole (PROTONIX) 40 mg tablet, Take 1 tablet (40 mg total) by mouth every morning, Disp: 90 tablet, Rfl: 3    metoprolol succinate (TOPROL-XL) 25 mg 24 hr tablet, Take 1 tablet (25 mg total) by mouth daily, Disp: 30 tablet, Rfl: 5  No Known Allergies  Vitals:    05/26/21 0920   BP: 138/82   Pulse: 73   SpO2: 96%   Weight: 103 kg (226 lb)   Height: 5' 10" (1 778 m)       Labs:  Office Visit on 05/18/2021   Component Date Value    Case Report 05/18/2021                      Value:Surgical Pathology Report                         Case: [id number]                                   Authorizing Provider:  Sean Roldan MD         Collected:           05/18/2021 1429              Ordering Location:     [facility]             Received:            05/18/2021 [facility]                                                         Pathologist:           Fe Appiah MD                                                           Specimen:    Skin, Other, Left Lateral [facility]  Final Diagnosis 05/18/2021                      Value: This result contains rich text formatting which cannot be displayed here   Additional Information 05/18/2021                      Value: This result contains rich text formatting which cannot be displayed here  [facility] Description 05/18/2021                      Value: This result contains rich text formatting which cannot be displayed here   Clinical Information 05/18/2021                      Value:A: Left lateral shin; Ski; Shave bx; 61year old male with a  0 8 cm x 0 5 cm; scaly pink verrucous  papule  DIIF:  Rule Out Verrucous papule versus  Other   Office Visit on 04/26/2021   Component Date Value    Case Report 04/26/2021                      Value:Surgical Pathology Report                         Case: [id number]                                   Authorizing Provider:  Jaime Correa MD         Collected:           04/26/2021 1446              Ordering Location[facility]     [facility]      Received:            04/26/2021 [address] [place] [address]                                                                       Pathologist:           Luda Wild MD                                                           Specimens:   A) - Skin, Other, Left chest                                                                        B) - Skin, Other, Left neck                                                                Final Diagnosis 04/26/2021                      Value: This result contains rich text formatting which cannot be displayed here   Additional Information 04/26/2021                      Value: This result contains rich text formatting which cannot be displayed here  [facility] Description 04/26/2021                      Value: This result contains rich text formatting which cannot be displayed here      Clinical Information 04/26/2021                      Value:A:Left chest,skin; shave biopsy; 66 year old male with a 0 8 x 0 8 cm ;skin color papule Rule out:atypical nevi   B:Left neck ;skin; shave biopsy:,61year old male with a 0 8 x 0 5; pink scaly papule Basal cell carcinoma versus actinic keratosis    ATTENTION:DR XIAO   Orders Only on 04/21/2021   Component Date Value    White Blood Cell Count 04/21/2021 9 6     Red Blood Cell Count 04/21/2021 5 68     Hemoglobin 04/21/2021 17 2     HCT 04/21/2021 51 2*    MCV 04/21/2021 90     MCH 04/21/2021 30 3     MCHC 04/21/2021 33 6     RDW 04/21/2021 12 6     Platelet Count 16/87/9554 233     Neutrophils 04/21/2021 73     Lymphocytes 04/21/2021 12     Monocytes 04/21/2021 11     Eosinophils 04/21/2021 4     Basophils PCT 04/21/2021 0     Neutrophils (Absolute) 04/21/2021 7 0     Lymphocytes (Absolute) 04/21/2021 1 1     Monocytes (Absolute) 04/21/2021 1 0*    Eosinophils (Absolute) 04/21/2021 0 4     Basophils ABS 04/21/2021 0 0     Immature Granulocytes 04/21/2021 0     Immature Granulocytes (A* 04/21/2021 0 0     Glucose, Random 04/21/2021 113*    BUN 04/21/2021 13     Creatinine 04/21/2021 1 19     eGFR Non  04/21/2021 64     eGFR  04/21/2021 74     SL AMB BUN/CREATININE RA* 04/21/2021 11     Sodium 04/21/2021 142     Potassium 04/21/2021 4 9     Chloride 04/21/2021 103     CO2 04/21/2021 28     CALCIUM 04/21/2021 9 4     Protein, Total 04/21/2021 6 3     Albumin 04/21/2021 4 5     Globulin, Total 04/21/2021 1 8     Albumin/Globulin Ratio 04/21/2021 2 5*    TOTAL BILIRUBIN 04/21/2021 1 0     Alk Phos Isoenzymes 04/21/2021 79     AST 04/21/2021 23     ALT 04/21/2021 36     Cholesterol, Total 04/21/2021 171     Triglycerides 04/21/2021 117     HDL 04/21/2021 44     VLDL Cholesterol Calcula* 04/21/2021 21     LDL Calculated 04/21/2021 106*    Prostate Specific Antige* 04/21/2021 1 9     Reflex Criteria 04/21/2021 Comment     Hemoglobin A1C 04/21/2021 6 1*     Imaging: No results found     Review of Systems:  Review of Systems   REVIEW OF SYSTEMS:  Constitutional:  Denies fever or chills   Eyes:  Denies change in visual acuity   HENT:  Denies nasal congestion or sore throat   Respiratory:  Denies cough or shortness of breath   Cardiovascular:  Denies chest pain or edema   GI:  Denies abdominal pain, nausea, vomiting, bloody stools or diarrhea   :  Denies dysuria, frequency, difficulty in micturition and nocturia  Musculoskeletal:  Denies back pain or joint pain   Neurologic:  Denies headache, focal weakness or sensory changes   Endocrine:  Denies polyuria or polydipsia   Lymphatic:  Denies swollen glands   Psychiatric: anxiety     Physical Exam:  Physical Exam   PHYSICAL EXAM:  General:  Patient is not in acute distress   Head: Normocephalic, Atraumatic  HEENT:  Both pupils normal-size atraumatic, normocephalic, nonicteric  Neck:  JVP not raised  Trachea central  No carotid bruit  Respiratory:  normal breath sounds no crackles  no rhonchi  Cardiovascular:  Regular rate and rhythm no S3 no murmurs  GI:  Abdomen soft nontender  No organomegaly  Lymphatic:  No cervical or inguinal lymphadenopathy  Neurologic:  Patient is awake alert, oriented   Grossly nonfocal  Extremities no edema     recent echocardiogram showed normal ejection fraction with no significant valvular abnormalities  Bio Tel monitoring done through primary care physician showed multiple PACs and brief episodes of SVT /PAT    Discussion/Summary:   patient with symptoms of palpitations of unclear etiology  Possible etiologies were discussed with the patient  Thyroid blood work done a few years ago was unremarkable  Results of echocardiogram as well as bio Tel monitor done through primary care physician reviewed  Patient still has some episodes of palpitations on Coreg  Will switch to Toprol-XL 25 mg daily and see how he does    Patient will be scheduled for a diagnostic stress test to assess for exercise capacity as well as ischemia  Symptoms to watch out from cardiac standpoint which would indicate the need for further cardiac evaluation also discussed with patient  Follow-up in 4 to 6 weeks or earlier as needed  Follow-up with primary care physician

## 2021-06-07 ENCOUNTER — OFFICE VISIT (OUTPATIENT)
Dept: DERMATOLOGY | Facility: CLINIC | Age: 66
End: 2021-06-07
Payer: COMMERCIAL

## 2021-06-07 VITALS — WEIGHT: 224 LBS | BODY MASS INDEX: 33.18 KG/M2 | HEIGHT: 69 IN | TEMPERATURE: 98 F

## 2021-06-07 DIAGNOSIS — D22.70 MULTIPLE BENIGN MELANOCYTIC NEVI OF UPPER AND LOWER EXTREMITIES AND TRUNK: ICD-10-CM

## 2021-06-07 DIAGNOSIS — L57.0 ACTINIC KERATOSIS: ICD-10-CM

## 2021-06-07 DIAGNOSIS — D22.5 MULTIPLE BENIGN MELANOCYTIC NEVI OF UPPER AND LOWER EXTREMITIES AND TRUNK: ICD-10-CM

## 2021-06-07 DIAGNOSIS — D22.60 MULTIPLE BENIGN MELANOCYTIC NEVI OF UPPER AND LOWER EXTREMITIES AND TRUNK: ICD-10-CM

## 2021-06-07 DIAGNOSIS — Z85.89 HISTORY OF SQUAMOUS CELL CARCINOMA: Primary | ICD-10-CM

## 2021-06-07 PROCEDURE — 17000 DESTRUCT PREMALG LESION: CPT | Performed by: DERMATOLOGY

## 2021-06-07 PROCEDURE — 17003 DESTRUCT PREMALG LES 2-14: CPT | Performed by: DERMATOLOGY

## 2021-06-07 PROCEDURE — 99213 OFFICE O/P EST LOW 20 MIN: CPT | Performed by: DERMATOLOGY

## 2021-06-07 NOTE — PATIENT INSTRUCTIONS
1  HISTORY OF SQUAMOUS CELL CARCINOMA     Assessment and Plan:  Based on a thorough discussion of this condition and the management approach to it (including a comprehensive discussion of the known risks, side effects and potential benefits of treatment), the patient (family) agrees to implement the following specific plan:   Monitor at routine skin exams    How can SCC be prevented? The most important way to prevent SCC is to avoid sunburn  This is especially important in childhood and early life  Fair skinned individuals and those with a personal or family history of BCC should protect their skin from sun exposure daily, year-round and lifelong   Stay indoors or under the shade in the middle of the day    Wear covering clothing    Apply high protection factor SPF50+ broad-spectrum sunscreens generously to exposed skin if outdoors    Avoid indoor tanning (sun beds, solaria)      What is the outlook for SCC? Most SCC are cured by treatment  Cure is most likely if treatment is undertaken when the lesion is small  A small percent of SCC's can spread to lymph  nodes and long term monitoring is indicated  They are also at increased risk of other skin cancers, especially melanoma  Regular self-skin examinations and long-term annual skin checks by an experienced health professional are recommended  2  ACTINIC KERATOSIS    Assessment and Plan:  Based on a thorough discussion of this condition and the management approach to it (including a comprehensive discussion of the known risks, side effects and potential benefits of treatment), the patient (family) agrees to implement the following specific plan:     Liquid nitrogen was applied for 10-12 seconds to the skin lesion and the expected blistering or scabbing reaction explained  Do not pick at the area  Patient reminded to expect hypopigmented scars from the procedure  Return if lesion fails to fully resolve     Apply sunscreen 30SPF+ or higher daily when getting sun exposer   Add Heliocare in morning along with the sunscreen   For preventive maintenance for sun exposer can take over the  counter Niacinamide 500 mg twice daily     Actinic keratoses are very common on sites repeatedly exposed to the sun, especially the backs of the hands and the face, most often affecting the ears, nose, cheeks, upper lip, vermilion of the lower lip, temples, forehead and balding scalp  In severely chronically sun-damaged individuals, they may also be found on the upper trunk, upper and lower limbs, and dorsum of feet  We discussed the theoretical premalignant (pre-cancerous) nature and etiology of these growths  We discussed the prevailing notion that actinic keratoses are a reflection of abnormal skin cell development due to DNA damage by short wavelength UVB  They are more likely to appear if the immune function is poor, due to aging, recent sun exposure, predisposing disease or certain drugs  We discussed that the main concern is that actinic keratoses may predispose to squamous cell carcinoma  It is rare for a solitary actinic keratosis to evolve to squamous cell carcinoma (SCC), but the risk of SCC occurring at some stage in a patient with more than 10 actinic keratoses is thought to be about 10 to 15%  A tender, thickened, ulcerated or enlarging actinic keratosis is suspicious of SCC  Actinic keratoses may be prevented by strict sun protection  If already present, keratoses may improve with a very high sun protection factor (50+) broad-spectrum sunscreen applied at least daily to affected areas, year-round  We recommend that UPF-rated clothing and hats and sunglasses be worn whenever possible and that a sunscreen-moisturizer combination product such as Neutrogena Daily Defense be applied at least three times a day      We performed a thorough discussion of treatment options and specific risk/benefits/alternatives including but not limited to [facility] treatment with medications such as the following:     Topical field area medications such as 5-fluorouracil or Aldara (specifically, the trouble with long-term compliance, blistering and local skin reaction versus the convenience of at-home therapy and that field therapy gets what is not yet seen)   Cryotherapy (specifically, local pain, scarring, dyspigmentation, blistering, possible superinfection, and treats only what we see versus directed treatment today)   Photodynamic therapy (specifically, local pain, scarring, dyspigmentation, blistering, possible superinfection, need to schedule for a later date, and time spent in the office versus field therapy that gets what is not yet seen)  3  MELANOCYTIC NEVI ("Moles")    Assessment and Plan:  Based on a thorough discussion of this condition and the management approach to it (including a comprehensive discussion of the known risks, side effects and potential benefits of treatment), the patient (family) agrees to implement the following specific plan:   Reassured benign     Melanocytic Nevi  Melanocytic nevi ("moles") are caused by collections of the color producing skin cells, or melanocytes, in 1 area in the skin  They can range in color from pink to dark brown and be either raised or flat  Some moles are present at birth (I e , "congenital nevi"), while others come up later in life (i e , "acquired nevi")  Zannie Rook exposure also stimulates the body to make more moles, ie the more sun you get the more moles you'll grow  Clinically distinguishing a healthy mole from melanoma may be difficult  The "ABCDE's" of moles have been suggested as a means of helping to alert a person to a suspicious mole and the possible increased risk of melanoma  Asymmetry: Healthy moles tend to be symmetric, while melanomas are often asymmetric    Asymmetry means if you draw a line through the mole, the two halves do not match in color, size, shape, or surface texture Any mole that starts to demonstrate "asymmetry" should be examined promptly by a board certified dermatologist      Nicole Aaron: Healthy moles tend to have discrete, even borders  The border of a melanoma often blends into the normal skin and does not sharply delineate the mole from normal skin  Any mole that starts to demonstrate "uneven borders" should be examined promptly     Color: Healthy moles tend to be one color throughout  Melanomas tend to be made up of different colors ranging from dark black, blue, white, or red  Any mole that demonstrates a color change should be examined promptly    Diameter: Healthy moles tend to be smaller than 0 6 cm in size; an exception are "congenital nevi" that can be larger  Melanomas tend to grow and can often be greater than 0 6 cm (1/4 of an inch, or the size of a pencil eraser)  This is only a guideline, and many normal moles may be larger than 0 6 cm without being unhealthy  Any mole that starts to change in size (small to bigger or bigger to smaller) should be examined promptly    Evolving: Healthy moles tend to "stay the same "  Melanomas may often show signs of change or evolution such as a change in size, shape, color, or elevation  Any mole that starts to itch, bleed, crust, burn, hurt, or ulcerate or demonstrate a change or evolution should be examined promptly by a board certified dermatologist       What are atypical moles or dysplastic nevi? Dysplastic moles are moles that have some of the ABCDE  changes listed above but  are not cancerous  Sometimes a biopsy and microscopic examination are needed to determine the difference  They may indicate an increased risk of melanoma in that person, especially if there is a family history of melanoma  What is a Melanoma? The main concern when looking at a new or changing mole it to evaluate whether it may be a melanoma   The appearance of a "new mole" remains one of the most reliable methods for identifying a malignant melanoma  A melanoma is a type of skin cancer that can be deadly if it spreads throughout the body  The prognosis of a Melanoma depends on how deep it has penetrated in the skin  If caught early, they generally will not have had time to grow into the deeper layers of the skin and they cure rate is then very high  Once the melanoma grows deeper into the skin, the cure rate drops dramatically  Therefore, early detection and removal of a malignant melanoma results in a much better chance of complete cure

## 2021-06-07 NOTE — PROGRESS NOTES
[facility] Follow Up Note    Patient Name: Yasmani Goodwin  Encounter Date: 06/07/21    Today's Chief Concerns:  Reina Willams Concern #1:  follow up skin check      Current Medications:    Current Outpatient Medications:     ALPRAZolam (XANAX) 0 25 mg tablet, Take 1 tablet (0 25 mg total) by mouth daily as needed for anxiety, Disp: 30 tablet, Rfl: 2    ASPIRIN LOW DOSE 81 MG EC tablet, TAKE 1 TABLET DAILY, Disp: 90 tablet, Rfl: 3    atorvastatin (LIPITOR) 40 mg tablet, TAKE 1 TABLET DAILY, Disp: 90 tablet, Rfl: 3    metoprolol succinate (TOPROL-XL) 25 mg 24 hr tablet, Take 1 tablet (25 mg total) by mouth daily, Disp: 30 tablet, Rfl: 5    pantoprazole (PROTONIX) 40 mg tablet, Take 1 tablet (40 mg total) by mouth every morning, Disp: 90 tablet, Rfl: 3    CONSTITUTIONAL:   Vitals:    06/07/21 1411   Temp: 98 °F (36 7 °C)   TempSrc: Temporal   Weight: 102 kg (224 lb)   Height: 5' 9" (1 753 m)         Specific Alerts:    Have you been seen by a [facility] Dermatologist in the last 3 years? YES    Are you pregnant or planning to become pregnant? N/A    Are you currently or planning to be nursing or breast feeding? N/A    No Known Allergies    May we call your Preferred Phone number to discuss your specific medical information? YES    May we leave a detailed message that includes your specific medical information? YES    Have you traveled outside of the [facility] in the past 3 months? No    Do you currently have a pacemaker or defibrillator? No    Do you have any artificial heart valves, joints, plates, screws, rods, stents, pins, etc? No   - If Yes, were any placed within the last 2 years? Do you require any medications prior to a surgical procedure? No    Are you taking any medications that cause you to bleed more easily ("blood thinners") YES    Have you ever experienced a rapid heartbeat with epinephrine? No    Have you ever been treated with "gold" (gold sodium thiomalate) therapy? No    [facility] can help with wrinkles, "laugh lines," facial volume loss, "double chin," "love handles," age spots, and more  Are you interested in learning today about some of the skin enhancement procedures that we offer? (If Yes, please provide more detail) No    Review of Systems:  Have you recently had or currently have any of the following? · Fever or chills: No  · Night Sweats: No  · Headaches: No  · Weight Gain: No  · Weight Loss: No  · Blurry Vision: No  · Nausea: No  · Vomiting: No  · Diarrhea: No  · Blood in Stool: No  · Abdominal Pain: No  · Itchy Skin: No  · Painful Joints: No  · Swollen Joints: No  · Muscle Pain: No  · Irregular Mole: No  · Sun Burn: No  · Dry Skin: No  · Skin Color Changes: No  · Scar or Keloid: No  · Cold Sores/Fever Blisters: No  · Bacterial Infections/MRSA: No  · Anxiety: No  · Depression: No  · Suicidal or Homicidal Thoughts: No      PSYCH: Normal mood and affect  EYES: Normal conjunctiva  ENT: Normal lips and oral mucosa  CARDIOVASCULAR: No edema  RESPIRATORY: Normal respirations  HEME/LYMPH/IMMUNO:  No regional lymphadenopathy except as noted below in ASSESSMENT AND PLAN BY DIAGNOSIS    FULL ORGAN SYSTEM SKIN EXAM (SKIN)   Hair, Scalp, Ears, Face Normal except as noted below in Assessment   Neck, Cervical Chain Nodes Normal except as noted below in Assessment   Right Arm/Hand/Fingers Normal except as noted below in Assessment   Left Arm/Hand/Fingers Normal except as noted below in Assessment   Chest/Breasts/Axillae Viewed areas Normal except as noted below in Assessment   Abdomen, Umbilicus Normal except as noted below in Assessment   Back/Spine Normal except as noted below in Assessment   Groin/Genitalia/Buttocks Viewed areas Normal except as noted below in Assessment   Right Leg, Foot, Toes Normal except as noted below in Assessment   Left Leg, Foot, Toes Normal except as noted below in Assessment       1   HISTORY OF SQUAMOUS CELL CARCINOMA     Physical Exam:   Anatomic Location Affected:  Left neck   Morphological Description of Scar:  Well healed scar   Suspected Recurrence: no   Regional adenopathy: no    Additional History of Present Condition:  patient had mohs surgery done on Left neck on 5/21/21 [id number]; biopsy proven Squamous Cell Carcinoma by Dr Wyatt Aburto and Plan:  Based on a thorough discussion of this condition and the management approach to it (including a comprehensive discussion of the known risks, side effects and potential benefits of treatment), the patient (family) agrees to implement the following specific plan:   Monitor at routine skin exams    How can SCC be prevented? The most important way to prevent SCC is to avoid sunburn  This is especially important in childhood and early life  Fair skinned individuals and those with a personal or family history of BCC should protect their skin from sun exposure daily, year-round and lifelong   Stay indoors or under the shade in the middle of the day    Wear covering clothing    Apply high protection factor SPF50+ broad-spectrum sunscreens generously to exposed skin if outdoors    Avoid indoor tanning (sun beds, solaria)      What is the outlook for SCC? Most SCC are cured by treatment  Cure is most likely if treatment is undertaken when the lesion is small  A small percent of SCC's can spread to lymph  nodes and long term monitoring is indicated  They are also at increased risk of other skin cancers, especially melanoma  Regular self-skin examinations and long-term annual skin checks by an experienced health professional are recommended      2  ACTINIC KERATOSIS    Physical Exam:   Anatomic Location Affected:  Scalp, forehead   Morphological Description:  Dry crusted macules    Additional History of Present Condition:  Patient noticed a spot on left forehead; comes and goes    Assessment and Plan:  Based on a thorough discussion of this condition and the management approach to it (including a comprehensive discussion of the known risks, side effects and potential benefits of treatment), the patient (family) agrees to implement the following specific plan:     Liquid nitrogen was applied for 10-12 seconds to the skin lesion and the expected blistering or scabbing reaction explained  Do not pick at the area  Patient reminded to expect hypopigmented scars from the procedure  Return if lesion fails to fully resolve   Apply sunscreen 30SPF+ or higher daily when getting sun exposer   Add Heliocare in morning along with the sunscreen   For preventive maintenance for sun exposer can take over the  counter Niacinamide 500 mg twice daily     Actinic keratoses are very common on sites repeatedly exposed to the sun, especially the backs of the hands and the face, most often affecting the ears, nose, cheeks, upper lip, vermilion of the lower lip, temples, forehead and balding scalp  In severely chronically sun-damaged individuals, they may also be found on the upper trunk, upper and lower limbs, and dorsum of feet  We discussed the theoretical premalignant (pre-cancerous) nature and etiology of these growths  We discussed the prevailing notion that actinic keratoses are a reflection of abnormal skin cell development due to DNA damage by short wavelength UVB  They are more likely to appear if the immune function is poor, due to aging, recent sun exposure, predisposing disease or certain drugs  We discussed that the main concern is that actinic keratoses may predispose to squamous cell carcinoma  It is rare for a solitary actinic keratosis to evolve to squamous cell carcinoma (SCC), but the risk of SCC occurring at some stage in a patient with more than 10 actinic keratoses is thought to be about 10 to 15%  A tender, thickened, ulcerated or enlarging actinic keratosis is suspicious of SCC  Actinic keratoses may be prevented by strict sun protection   If already present, keratoses may improve with a very high sun protection factor (50+) broad-spectrum sunscreen applied at least daily to affected areas, year-round  We recommend that UPF-rated clothing and hats and sunglasses be worn whenever possible and that a sunscreen-moisturizer combination product such as Neutrogena Daily Defense be applied at least three times a day  We performed a thorough discussion of treatment options and specific risk/benefits/alternatives including but not limited to medical field treatment with medications such as the following:     Topical field area medications such as 5-fluorouracil or Aldara (specifically, the trouble with long-term compliance, blistering and local skin reaction versus the convenience of at-home therapy and that field therapy gets what is not yet seen)   Cryotherapy (specifically, local pain, scarring, dyspigmentation, blistering, possible superinfection, and treats only what we see versus directed treatment today)   Photodynamic therapy (specifically, local pain, scarring, dyspigmentation, blistering, possible superinfection, need to schedule for a later date, and time spent in the office versus field therapy that gets what is not yet seen)  PROCEDURE:  DESTRUCTION OF PRE-MALIGNANT LESIONS  After a thorough discussion of treatment options and risk/benefits/alternatives (including but not limited to local pain, scarring, dyspigmentation, blistering, and possible superinfection), verbal and written consent were obtained and the aforementioned lesions were treated on with cryotherapy using liquid nitrogen x 1 cycle for 5-10 seconds   TOTAL NUMBER of  7 pre-malignant lesions were treated today on the ANATOMIC LOCATION: scalp and forehead  The patient tolerated the procedure well, and after-care instructions were provided      3  MELANOCYTIC NEVI ("Moles")    Physical Exam:   Anatomic Location Affected:   Mostly on sun-exposed areas of the trunk and extremeties   Morphological Description:  Scattered, 1-4mm round to ovoid, symmetrical-appearing, even bordered, skin colored to dark brown macules/papules, mostly in sun-exposed areas   Pertinent Positives:   Pertinent Negatives: Additional History of Present Condition:  Patient has had for many years; does see some new ones but nothing concerning, had several removed in teens and all were normal    Assessment and Plan:  Based on a thorough discussion of this condition and the management approach to it (including a comprehensive discussion of the known risks, side effects and potential benefits of treatment), the patient (family) agrees to implement the following specific plan:   Reassaured benign     Melanocytic Nevi  Melanocytic nevi ("moles") are caused by collections of the color producing skin cells, or melanocytes, in 1 area in the skin  They can range in color from pink to dark brown and be either raised or flat  Some moles are present at birth (I e , "congenital nevi"), while others come up later in life (i e , "acquired nevi")  Karla Malady exposure also stimulates the body to make more moles, ie the more sun you get the more moles you'll grow  Clinically distinguishing a healthy mole from melanoma may be difficult  The "ABCDE's" of moles have been suggested as a means of helping to alert a person to a suspicious mole and the possible increased risk of melanoma  Asymmetry: Healthy moles tend to be symmetric, while melanomas are often asymmetric  Asymmetry means if you draw a line through the mole, the two halves do not match in color, size, shape, or surface texture Any mole that starts to demonstrate "asymmetry" should be examined promptly by a board certified dermatologist      Border: Healthy moles tend to have discrete, even borders  The border of a melanoma often blends into the normal skin and does not sharply delineate the mole from normal skin    Any mole that starts to demonstrate "uneven borders" should be examined promptly     Color: Healthy moles tend to be one color throughout  Melanomas tend to be made up of different colors ranging from dark black, blue, white, or red  Any mole that demonstrates a color change should be examined promptly    Diameter: Healthy moles tend to be smaller than 0 6 cm in size; an exception are "congenital nevi" that can be larger  Melanomas tend to grow and can often be greater than 0 6 cm (1/4 of an inch, or the size of a pencil eraser)  This is only a guideline, and many normal moles may be larger than 0 6 cm without being unhealthy  Any mole that starts to change in size (small to bigger or bigger to smaller) should be examined promptly    Evolving: Healthy moles tend to "stay the same "  Melanomas may often show signs of change or evolution such as a change in size, shape, color, or elevation  Any mole that starts to itch, bleed, crust, burn, hurt, or ulcerate or demonstrate a change or evolution should be examined promptly by a board certified dermatologist       What are atypical moles or dysplastic nevi? Dysplastic moles are moles that have some of the ABCDE  changes listed above but  are not cancerous  Sometimes a biopsy and microscopic examination are needed to determine the difference  They may indicate an increased risk of melanoma in that person, especially if there is a family history of melanoma  What is a Melanoma? The main concern when looking at a new or changing mole it to evaluate whether it may be a melanoma  The appearance of a "new mole" remains one of the most reliable methods for identifying a malignant melanoma  A melanoma is a type of skin cancer that can be deadly if it spreads throughout the body  The prognosis of a Melanoma depends on how deep it has penetrated in the skin  If caught early, they generally will not have had time to grow into the deeper layers of the skin and they cure rate is then very high   Once the melanoma grows deeper into the skin, the cure rate drops dramatically  Therefore, early detection and removal of a malignant melanoma results in a much better chance of complete cure         Scribe Attestation    I,:  Gurpreet Velazco am acting as a scribe while in the presence of the attending physician :       I,:  Cali Leo MD personally performed the services described in this documentation    as scribed in my presence :

## 2021-06-17 ENCOUNTER — HOSPITAL ENCOUNTER (OUTPATIENT)
Dept: NON INVASIVE DIAGNOSTICS | Facility: HOSPITAL | Age: 66
Discharge: HOME | End: 2021-06-17
Attending: INTERNAL MEDICINE
Payer: COMMERCIAL

## 2021-06-17 DIAGNOSIS — I47.1 SVT (SUPRAVENTRICULAR TACHYCARDIA) (HCC): ICD-10-CM

## 2021-06-17 DIAGNOSIS — R00.2 PALPITATION: ICD-10-CM

## 2021-06-17 LAB
CHEST PAIN STATEMENT: NORMAL
MAX DIASTOLIC BP: 78 MMHG
MAX HEART RATE: 144 BPM
MAX PREDICTED HEART RATE: 156 BPM
MAX. SYSTOLIC BP: 148 MMHG
PROTOCOL NAME: NORMAL
REASON FOR TERMINATION: NORMAL
TARGET HR FORMULA: NORMAL
TEST INDICATION: NORMAL
TIME IN EXERCISE PHASE: NORMAL

## 2021-06-17 PROCEDURE — 93018 CV STRESS TEST I&R ONLY: CPT | Performed by: INTERNAL MEDICINE

## 2021-06-17 PROCEDURE — 93016 CV STRESS TEST SUPVJ ONLY: CPT | Performed by: INTERNAL MEDICINE

## 2021-06-17 PROCEDURE — 93017 CV STRESS TEST TRACING ONLY: CPT

## 2021-07-01 DIAGNOSIS — R00.2 PALPITATION: ICD-10-CM

## 2021-07-01 DIAGNOSIS — I47.1 SVT (SUPRAVENTRICULAR TACHYCARDIA) (HCC): ICD-10-CM

## 2021-07-01 RX ORDER — METOPROLOL SUCCINATE 25 MG/1
25 TABLET, EXTENDED RELEASE ORAL DAILY
Qty: 90 TABLET | Refills: 3 | Status: SHIPPED | OUTPATIENT
Start: 2021-07-01 | End: 2022-06-09

## 2021-07-15 ENCOUNTER — OFFICE VISIT (OUTPATIENT)
Dept: CARDIOLOGY CLINIC | Facility: CLINIC | Age: 66
End: 2021-07-15
Payer: COMMERCIAL

## 2021-07-15 VITALS
OXYGEN SATURATION: 95 % | HEIGHT: 69 IN | SYSTOLIC BLOOD PRESSURE: 124 MMHG | BODY MASS INDEX: 33.27 KG/M2 | DIASTOLIC BLOOD PRESSURE: 70 MMHG | HEART RATE: 69 BPM | WEIGHT: 224.6 LBS

## 2021-07-15 DIAGNOSIS — I47.1 SVT (SUPRAVENTRICULAR TACHYCARDIA) (HCC): Primary | ICD-10-CM

## 2021-07-15 DIAGNOSIS — E78.2 MIXED HYPERLIPIDEMIA: ICD-10-CM

## 2021-07-15 DIAGNOSIS — R00.2 PALPITATION: ICD-10-CM

## 2021-07-15 PROCEDURE — 99213 OFFICE O/P EST LOW 20 MIN: CPT | Performed by: INTERNAL MEDICINE

## 2021-07-15 NOTE — PROGRESS NOTES
[facility] Phuong Eckert [address] [facility] [place] [id number]  Cardiology Follow Up    Catherine Blake  1955  [id number]      1  SVT (supraventricular tachycardia) (HCC)     2  Palpitation     3  Mixed hyperlipidemia         Chief Complaint   Patient presents with    Follow-up     6 week follow up/med change       Interval History:   Patient presents for follow-up visit  Patient denies any history of chest pain shortness of breath  Patient denies any history of leg edema or orthopnea PND  No history of presyncope syncope  Patient states compliance with the present list of medications  Patient's symptoms of palpitations are much better after switching to metoprolol succinate      Patient Active Problem List   Diagnosis    Gastroesophageal reflux disease without esophagitis    Hyperlipidemia    Prehypertension    Diabetes mellitus screening    Prostate cancer screening    Dyspepsia    Serum total bilirubin elevated    Right hip pain    Impaired fasting glucose    Memory loss    Anxiety    Behavioral change    Memory difficulty    Palpitation    PAC (premature atrial contraction)     Past Medical History:   Diagnosis Date    Abnormal ECG 2021    Had fluttering of my heart    Basal cell carcinoma     GERD (gastroesophageal reflux disease)     Hyperlipidemia     Seasonal allergies     Squamous cell skin cancer 2021    Left neck SCCIS     Social History     Socioeconomic History    Marital status: /Civil Union     Spouse name: Not on file    Number of children: Not on file    Years of education: Not on file    Highest education level: Not on file   Occupational History    Not on file   Tobacco Use    Smoking status: Former Smoker     Packs/day: 1 00     Years: 3 00     Pack years: 3 00     Types: Cigarettes     Start date: 1970     Quit date:      Years since quittin 5    Smokeless tobacco: Never Used   Vaping Use    Vaping Use: Never used Substance and Sexual Activity    Alcohol use: Yes     Alcohol/week: 3 0 standard drinks     Types: 3 Glasses of wine per week    Drug use: No    Sexual activity: Yes     Partners: Female     Birth control/protection: Female Sterilization   Other Topics Concern    Not on file   Social History Narrative    Not on file     Social Determinants of Health     Financial Resource Strain:     Difficulty of Paying Living Expenses:    Food Insecurity:     Worried About Running Out of Food in the Last Year:     [address] in the Last Year:    Transportation Needs:     Lack of Transportation (Medical):  Lack of Transportation (Non-Medical):    Physical Activity:     Days of Exercise per Week:     Minutes of Exercise per Session:    Stress:     Feeling of Stress :    Social Connections:     Frequency of Communication with Friends and Family:     Frequency of Social Gatherings with Friends and Family:     Attends Alevism Services:     Active Member of Clubs or Organizations:     Attends Club or Organization Meetings:     Marital Status:    Intimate Partner Violence:     Fear of Current or Ex-Partner:     Emotionally Abused:     Physically Abused:     Sexually Abused:       Family History   Problem Relation Age of Onset    Colon cancer Mother     Other Mother         septicemia    Hypertension Father     Heart attack Father         NSTEMI    Hypertension Sister     Squamous cell carcinoma Sister     Hypertension Brother         Has defibulator and pace maker    Cancer Brother     Basal cell carcinoma Brother      Past Surgical History:   Procedure Laterality Date    CHOLECYSTECTOMY      COLONOSCOPY N/A 10/4/2017    Procedure: COLONOSCOPY;  Surgeon: Abebe Lobo MD;  Location[facility];   Service: Gastroenterology    EAR SURGERY      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)    MOHS SURGERY  05/04/2021    left neck     SC ESOPHAGOGASTRODUODENOSCOPY TRANSORAL DIAGNOSTIC N/A 5/31/2018    Procedure: ESOPHAGOGASTRODUODENOSCOPY (EGD); Surgeon: Davide Clements MD;  Location[facility]; Service: Gastroenterology    SINUS SURGERY      SKIN BIOPSY         Current Outpatient Medications:     ALPRAZolam (XANAX) 0 25 mg tablet, Take 1 tablet (0 25 mg total) by mouth daily as needed for anxiety, Disp: 30 tablet, Rfl: 2    ASPIRIN LOW DOSE 81 MG EC tablet, TAKE 1 TABLET DAILY, Disp: 90 tablet, Rfl: 3    atorvastatin (LIPITOR) 40 mg tablet, TAKE 1 TABLET DAILY, Disp: 90 tablet, Rfl: 3    metoprolol succinate (TOPROL-XL) 25 mg 24 hr tablet, Take 1 tablet (25 mg total) by mouth daily, Disp: 90 tablet, Rfl: 3    pantoprazole (PROTONIX) 40 mg tablet, Take 1 tablet (40 mg total) by mouth every morning, Disp: 90 tablet, Rfl: 3  No Known Allergies    Labs:  Hospital Outpatient Visit on 06/17/2021   Component Date Value    Protocol Name 06/17/2021 TOPHER     Time In Exercise Phase 06/17/2021 00:10:00     MAX   SYSTOLIC BP 76/29/7382 604     Max Diastolic Bp 90/80/3146 78     Max Heart Rate 06/17/2021 144     Max Predicted Heart Rate 06/17/2021 156     Reason for Termination 06/17/2021 Maximal exercise (symptom limited)     Test Indication 06/17/2021 Palpitation SVT     Target Hr Formular 06/17/2021 (220 - Age)*85%     Chest Pain Statement 06/17/2021 none    Office Visit on 05/18/2021   Component Date Value    Case Report 05/18/2021                      Value:Surgical Pathology Report                         Case: [id number]                                   Authorizing Provider:  Sydni Kirkland MD         Collected:           05/18/2021 1429              Ordering Location:     [facility]             Received:            05/18/2021 [facility]                                                         Pathologist:           Letitia Schneider MD                                                           Specimen:    Skin, Other, Left Lateral Sally Simmons Final Diagnosis 2021                      Value: This result contains rich text formatting which cannot be displayed here   Additional Information 2021                      Value: This result contains rich text formatting which cannot be displayed here  Elvia Danielle Gross Description 2021                      Value: This result contains rich text formatting which cannot be displayed here   Clinical Information 2021                      Value:A: Left lateral shin; Ski; Shave bx; 61year old male with a  0 8 cm x 0 5 cm; scaly pink verrucous  papule  DIIF:  Rule Out Verrucous papule versus  Other     Imaging: Stress test only, exercise    Result Date: 2021  Narrative: [facility] [address] [phone] Stress Electrocardiography during Exercise Name: Ishaan Gilbert MR #: [id number] Account #: [de-identified] Study date: 2021 : 1955 Age: 61 years Gender: Male Height: 69 in Weight: 224 lb BSA: 2 17 mï¾² Allergies: NO KNOWN ALLERGIES Diagnosis: R00 2 - Palpitations Primary Physician:  Antoinette iSm MD RN:  Jannette Srivastava RN Referring Physician:  Sasha Luna MD Interpreting Physician:  Sasha Luna MD Group:  [facility] Report Prepared By[de-identified]  Jannette Srivastava RN CLINICAL QUESTION: Detection of coronary artery disease  HISTORY: The patient is a 61year old  male  Chest pain status: no chest pain  Other symptoms: palpitations  Coronary artery disease risk factors: dyslipidemia, hypertension, family history of premature coronary artery disease ( father had MI), and diabetes mellitus  Cardiovascular history: arrhythmia/ PAC's  Medications: a beta blocker, aspirin, and a lipid lowering agent  Previous test results: abnormal ECG  PHYSICAL EXAM: Baseline physical exam screening: no wheezes audible, no loud murmur   REST ECG: Normal sinus rhythm  RBBB  PACs  PROCEDURE: Treadmill exercise testing was performed, using the Reg protocol  Stress electrocardiographic evaluation was performed  REG PROTOCOL: HR bpm SBP mmHg DBP mmHg Symptoms Baseline 62 128 78 none Stage 1 99 128 78 none Stage 2 113 140 72 none Stage 3 129 144 78 mild dyspnea Stage 4 139 -- -- mild dyspnea, moderate fatigue Recovery 1 101 148 78 subsiding Recovery 2 89 142 76 none Recovery 3 86 126 80 none No medications or fluids given  STRESS SUMMARY: Duration of exercise was 10 min and 0 sec  The patient exercised to protocol stage 4  Maximal work rate was 13 1 METs  Maximal heart rate during stress was 144 bpm ( 92 % of maximal predicted heart rate)  The rate-pressure product for the peak heart rate and blood pressure was 93998  There was no chest pain during stress  The stress test was terminated due to achievement of maximal (symptom limited) exercise  The stress ECG was equivocal for ischemia  1mm ST segment depression in leads V2 and V3 but less specific given RBBB  Arrhythmia during stress: short episode of supraventricular tachycardia  SUMMARY: -  Stress results: Duration of exercise was 10 min and 0 sec  Target heart rate was achieved  There was no chest pain during stress  -  ECG conclusions: The stress ECG was equivocal for ischemia  1mm ST segment depression in leads V2 and V3 but less specific given RBBB  Arrhythmia during stress: short episode of supraventricular tachycardia  IMPRESSIONS: Normal study at the work load achieved   Prepared and signed by Aguila Rajput MD Signed 06/17/2021 15:22:10     Stress strip    Result Date: 6/17/2021  Narrative: Confirmed by Diana Rodriguez ([id number]),  Salma Martinez ([id number]) on 6/17/2021 2:07:10 PM      Review of Systems:  Review of Systems     REVIEW OF SYSTEMS:  Constitutional:  Denies fever or chills   Eyes:  Denies change in visual acuity   HENT:  Denies nasal congestion or sore throat   Respiratory:  Denies cough or shortness of breath   Cardiovascular:  Denies chest pain or edema   GI:  Denies abdominal pain, nausea, vomiting, bloody stools or diarrhea   :  Denies dysuria, frequency, difficulty in micturition and nocturia  Musculoskeletal:  Denies back pain or joint pain   Neurologic:  Denies headache, focal weakness or sensory changes   Endocrine:  Denies polyuria or polydipsia   Lymphatic:  Denies swollen glands   Psychiatric:  Denies depression or anxiety     Physical Exam:    /70   Pulse 69   Ht 5' 9" (1 753 m)   Wt 102 kg (224 lb 9 6 oz)   SpO2 95%   BMI 33 17 kg/m²     Physical Exam   PHYSICAL EXAM:  General:  Patient is not in acute distress   Head: Normocephalic, Atraumatic  HEENT:  Both pupils normal-size atraumatic, normocephalic, nonicteric  Neck:  JVP not raised  Trachea central  No carotid bruit  Respiratory:  normal breath sounds no crackles  no rhonchi  Cardiovascular:  Regular rate and rhythm no S3 no murmurs  GI:  Abdomen soft nontender  No organomegaly  Lymphatic:  No cervical or inguinal lymphadenopathy  Neurologic:  Patient is awake alert, oriented   Grossly nonfocal    Extremities no edema    Discussion/Summary:   patient overall doing well from a cardiovascular standpoint  Patient doing much better in terms of palpitations  Patient had diagnostic stress test which was good with excellent functional be capacity  Symptoms to watch out from cardiac standpoint which would indicate the need for further cardiac evaluation discussed  Patient does have history of anxiety  Emotional support provided to the patient with history of anxiety  Follow-up in 6 to 8 months or earlier as needed  Follow-up with primary care physician  Patient is agreeable with the plan of care

## 2021-08-30 ENCOUNTER — TELEPHONE (OUTPATIENT)
Dept: FAMILY MEDICINE CLINIC | Facility: CLINIC | Age: 66
End: 2021-08-30

## 2021-08-30 DIAGNOSIS — R05.9 COUGH: Primary | ICD-10-CM

## 2021-08-30 PROCEDURE — U0003 INFECTIOUS AGENT DETECTION BY NUCLEIC ACID (DNA OR RNA); SEVERE ACUTE RESPIRATORY SYNDROME CORONAVIRUS 2 (SARS-COV-2) (CORONAVIRUS DISEASE [COVID-19]), AMPLIFIED PROBE TECHNIQUE, MAKING USE OF HIGH THROUGHPUT TECHNOLOGIES AS DESCRIBED BY CMS-2020-01-R: HCPCS | Performed by: FAMILY MEDICINE

## 2021-08-30 PROCEDURE — U0005 INFEC AGEN DETEC AMPLI PROBE: HCPCS | Performed by: FAMILY MEDICINE

## 2021-08-30 NOTE — TELEPHONE ENCOUNTER
Pt called requesting covid swab- he is experiencing a cough, congestion, body aches, heaviness in chest, and a sore throat beginning on Thursday   Please call pt when test is ordered- thank you

## 2021-08-31 LAB — SARS-COV-2 RNA RESP QL NAA+PROBE: POSITIVE

## 2021-09-01 ENCOUNTER — TELEPHONE (OUTPATIENT)
Dept: FAMILY MEDICINE CLINIC | Facility: CLINIC | Age: 66
End: 2021-09-01

## 2021-09-01 ENCOUNTER — TELEMEDICINE (OUTPATIENT)
Dept: FAMILY MEDICINE CLINIC | Facility: CLINIC | Age: 66
End: 2021-09-01
Payer: COMMERCIAL

## 2021-09-01 DIAGNOSIS — U07.1 COVID-19 VIRUS INFECTION: Primary | ICD-10-CM

## 2021-09-01 PROCEDURE — 99213 OFFICE O/P EST LOW 20 MIN: CPT | Performed by: FAMILY MEDICINE

## 2021-09-01 RX ORDER — ONDANSETRON 2 MG/ML
4 INJECTION INTRAMUSCULAR; INTRAVENOUS ONCE AS NEEDED
Status: CANCELLED | OUTPATIENT
Start: 2021-09-02

## 2021-09-01 RX ORDER — FLUTICASONE PROPIONATE 50 MCG
1 SPRAY, SUSPENSION (ML) NASAL DAILY
Qty: 11.1 ML | Refills: 1 | Status: SHIPPED | OUTPATIENT
Start: 2021-09-01 | End: 2022-01-26

## 2021-09-01 RX ORDER — ACETAMINOPHEN 325 MG/1
650 TABLET ORAL ONCE AS NEEDED
Status: CANCELLED | OUTPATIENT
Start: 2021-09-02

## 2021-09-01 RX ORDER — LORATADINE 10 MG/1
10 TABLET ORAL DAILY
Qty: 10 TABLET | Refills: 2 | Status: SHIPPED | OUTPATIENT
Start: 2021-09-01 | End: 2022-04-07 | Stop reason: ALTCHOICE

## 2021-09-01 RX ORDER — ALBUTEROL SULFATE 90 UG/1
3 AEROSOL, METERED RESPIRATORY (INHALATION) ONCE AS NEEDED
Status: CANCELLED | OUTPATIENT
Start: 2021-09-02

## 2021-09-01 RX ORDER — SODIUM CHLORIDE 9 MG/ML
20 INJECTION, SOLUTION INTRAVENOUS ONCE
Status: CANCELLED | OUTPATIENT
Start: 2021-09-02

## 2021-09-01 NOTE — PROGRESS NOTES
COVID-19 Outpatient Progress Note    Assessment/Plan:    Problem List Items Addressed This Visit        Other    COVID-19 virus infection - Primary         Disposition:     I recommended self-quarantine for 10 days and to watch for symptoms until 14 days after exposure  If patient were to develop symptoms, they should self isolate and call our office for further guidance  Patient is at increased risk of progressing towards severe COVID-19 due to the following high risk criteria:   - Obesity or being overweight    Patient meets criteria for Casirivimab/Imdevimab administration for the treatment of COVID-19  They were counseled in regards to risks, benefits, and side effects of this infusion  Casirivimab and imdevimab are investigational medicines used to treat mild to moderate symptoms of COVID-19 in non-hospitalized adults and adolescents (15years of age and older who weigh at least 80 pounds (40 kg)), and who are at high risk for developing severe COVID-19 symptoms or the need for hospitalization  Casirivimab and imdevimab are investigational because they are still being studied  There is limited information known about the safety and effectiveness of using casirivimab and imdevimab to treat people with COVID-19  The FDA has authorized the emergency use of casirivimab and imdevimab for the treatment of COVID-19 under an Emergency Use Authorization (EUA)  Possible side effects of casirivimab and imdevimab: Allergic reactions can happen during and after infusion with casirivimab and imdevimab  Possible reactions include: fever, chills, nausea, headache, shortness of breath, low blood pressure, wheezing, swelling of your lips, face, or throat, rash including hives, itching, muscle aches, and dizziness  The side effects of getting any medicine by vein may include brief pain, bleeding, bruising of the skin, soreness, swelling, and possible infection at the infusion site      These are not all the possible side effects of casirivimab and imdevimab  Not a lot of people have been given casirivimab and imdevimab  Serious and unexpected side effects may happen  Casirivimab and imdevimab are still being studied so it is possible that all of the risks are not known at this time  It is possible that casirivimab and imdevimab could interfere with your body's own ability to fight off a future infection of SARS-CoV-2  Similarly, casirivimab and imdevimab may reduce your body's immune response to a vaccine for SARS-CoV-2  Specific studies have not been conducted to address these possible risks  Emergency Use Authorization:    The [facility] FDA has made casirivimab and imdevimab available under an emergency access mechanism called an EUA  The EUA is supported by a  of Health and Human Service ([facility]) declaration that circumstances exist to justify the emergency use of drugs and biological products during the COVID-19 pandemic  Casirivimab and imdevimab have not undergone the same type of review as an FDA-approved or cleared product  The FDA may issue an EUA when certain criteria are met, which includes that there are no adequate, approved, available alternatives  In addition, the FDA decision is based on the totality of scientific evidence available showing that it is reasonable to believe that the product meets certain criteria for safety, performance, and labeling and may be effective in treatment of patients during the COVID-19 pandemic  All of these criteria must be met to allow for the product to be used in the treatment of patients during the COVID-19 pandemic  The EUA for casirivimab and imdevimab is in effect for the duration of the COVID-19 declaration justifying emergency use of these products, unless terminated or revoked (after which the products may no longer be used)       Regarding COVID-19 Vaccination:    Currently there is no data or safety or efficacy of COVID-19 vaccination in persons who received monoclonal antibodies as part of COVID-19 treatment  Treatment should be deferred for at least 90 days to avoid interference of the treatment with vaccine-induced immune responses (this is based on estimated half-life of therapies and evidence suggesting reinfection is uncommon within 90 days of initial infection)  The patient consents to proceed with casirivimab and imdevimab administration  I have spent 12 minutes directly with the patient  Verification of patient location:    Patient is located in the following state in which I hold an active license [place]    Encounter provider Jorge Majano MD    Provider located at [place] [address]  [address] [place] [id number]    Recent Visits  Date Type Provider Dept   08/30/21 Telephone [address] recent visits within past 7 days and meeting all other requirements  Today's Visits  Date Type Provider Dept   09/01/21 Telemedicine Jorge Majano MD [address] today's visits and meeting all other requirements  Future Appointments  No visits were found meeting these conditions  Showing future appointments within next 150 days and meeting all other requirements     This virtual check-in was done via [employer] and patient was informed that this is a secure, HIPAA-compliant platform  He agrees to proceed  Patient agrees to participate in a virtual check in via telephone or video visit instead of presenting to the office to address urgent/immediate medical needs  Patient is aware this is a billable service  After connecting through [facility], the patient was identified by name and date of birth  Tien Roth was informed that this was a telemedicine visit and that the exam was being conducted confidentially over secure lines  My office door was closed  No one else was in the room   Tien Roth acknowledged consent and understanding of privacy and security of the telemedicine visit  I informed the patient that I have reviewed his record in Epic and presented the opportunity for him to ask any questions regarding the visit today  The patient agreed to participate  Subjective:   Sukhi James is a 61 y o  male who is concerned about COVID-19  Patient's symptoms include fever, chills, nasal congestion, anosmia, loss of taste and cough  Patient denies fatigue, rhinorrhea, sore throat, shortness of breath, abdominal pain, nausea, vomiting, diarrhea, myalgias and headaches       Date of symptom onset: 8/26/2021  COVID-19 vaccination status: Fully vaccinated    Exposure:   Contact with a person who is under investigation (PUI) for or who is positive for COVID-19 within the last 14 days?: No    Hospitalized recently for fever and/or lower respiratory symptoms?: No      Currently a healthcare worker that is involved in direct patient care?: No      Works in a special setting where the risk of COVID-19 transmission may be high? (this may include long-term care, correctional and shelter facilities; homeless shelters; assisted-living facilities and group homes ): No      Resident in a special setting where the risk of COVID-19 transmission may be high? (this may include long-term care, correctional and shelter facilities; homeless shelters; assisted-living facilities and group homes ): No      Lab Results   Component Value Date    SARSCOV2 Positive (A) 08/30/2021    Siricosmo Lujan Not Detected 11/09/2020     Past Medical History:   Diagnosis Date    Abnormal ECG 5/2021    Had fluttering of my heart    Basal cell carcinoma     GERD (gastroesophageal reflux disease)     Hyperlipidemia     Seasonal allergies     Squamous cell skin cancer 04/26/2021    Left neck SCCIS     Past Surgical History:   Procedure Laterality Date    CHOLECYSTECTOMY      COLONOSCOPY N/A 10/4/2017    Procedure: COLONOSCOPY;  Surgeon: Manuel Lin MD; Location: [facility]; Service: Gastroenterology    EAR SURGERY      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)    MOHS SURGERY  05/04/2021    left neck     [place] ESOPHAGOGASTRODUODENOSCOPY TRANSORAL DIAGNOSTIC N/A 5/31/2018    Procedure: ESOPHAGOGASTRODUODENOSCOPY (EGD); Surgeon: Gregory Santos MD;  [facility]; Service: Gastroenterology    SINUS SURGERY      SKIN BIOPSY       Current Outpatient Medications   Medication Sig Dispense Refill    ALPRAZolam (XANAX) 0 25 mg tablet Take 1 tablet (0 25 mg total) by mouth daily as needed for anxiety 30 tablet 2    ASPIRIN LOW DOSE 81 MG EC tablet TAKE 1 TABLET DAILY 90 tablet 3    atorvastatin (LIPITOR) 40 mg tablet TAKE 1 TABLET DAILY 90 tablet 3    metoprolol succinate (TOPROL-XL) 25 mg 24 hr tablet Take 1 tablet (25 mg total) by mouth daily 90 tablet 3    pantoprazole (PROTONIX) 40 mg tablet Take 1 tablet (40 mg total) by mouth every morning 90 tablet 3     No current facility-administered medications for this visit  No Known Allergies    Review of Systems   Constitutional: Positive for chills and fever  Negative for fatigue  HENT: Positive for congestion  Negative for rhinorrhea and sore throat  Eyes: Negative for discharge and itching  Respiratory: Positive for cough  Negative for shortness of breath and wheezing  Cardiovascular: Negative for chest pain, palpitations and leg swelling  Gastrointestinal: Negative for abdominal distention, abdominal pain, diarrhea, nausea and vomiting  Endocrine: Negative for polyuria  Genitourinary: Negative for dysuria, frequency and hematuria  Musculoskeletal: Negative for arthralgias, back pain, gait problem and myalgias  Skin: Negative for rash  Allergic/Immunologic: Negative for food allergies  Neurological: Negative for dizziness, numbness and headaches  Hematological: Does not bruise/bleed easily     Psychiatric/Behavioral: Negative for behavioral problems, dysphoric mood and sleep disturbance  The patient is not nervous/anxious  Objective: There were no vitals filed for this visit  Physical Exam  Vitals and nursing note reviewed  Constitutional:       Appearance: He is well-developed  HENT:      Head: Normocephalic and atraumatic  Eyes:      Conjunctiva/sclera: Conjunctivae normal    Pulmonary:      Effort: Pulmonary effort is normal    Musculoskeletal:      Cervical back: Neck supple  Skin:     General: Skin is warm and dry  Neurological:      Mental Status: He is alert  VIRTUAL VISIT DISCLAIMER    Karely Poon verbally agrees to participate in [facility]  Pt is aware that [facility] could be limited without vital signs or the ability to perform a full hands-on physical exam  Luca Camacho understands he or the provider may request at any time to terminate the video visit and request the patient to seek care or treatment in person

## 2021-09-02 ENCOUNTER — HOSPITAL ENCOUNTER (OUTPATIENT)
Dept: INFUSION CENTER | Facility: HOSPITAL | Age: 66
Discharge: HOME | End: 2021-09-02
Payer: COMMERCIAL

## 2021-09-02 VITALS
DIASTOLIC BLOOD PRESSURE: 71 MMHG | SYSTOLIC BLOOD PRESSURE: 137 MMHG | TEMPERATURE: 97.1 F | HEART RATE: 55 BPM | RESPIRATION RATE: 18 BRPM | OXYGEN SATURATION: 96 %

## 2021-09-02 DIAGNOSIS — U07.1 COVID-19 VIRUS INFECTION: Primary | ICD-10-CM

## 2021-09-02 PROCEDURE — M0243 CASIRIVI AND IMDEVI INFUSION: HCPCS | Performed by: FAMILY MEDICINE

## 2021-09-02 RX ORDER — ONDANSETRON 2 MG/ML
4 INJECTION INTRAMUSCULAR; INTRAVENOUS ONCE AS NEEDED
Status: CANCELLED | OUTPATIENT
Start: 2021-09-02

## 2021-09-02 RX ORDER — ACETAMINOPHEN 325 MG/1
650 TABLET ORAL ONCE AS NEEDED
Status: CANCELLED | OUTPATIENT
Start: 2021-09-02

## 2021-09-02 RX ORDER — ALBUTEROL SULFATE 90 UG/1
3 AEROSOL, METERED RESPIRATORY (INHALATION) ONCE AS NEEDED
Status: CANCELLED | OUTPATIENT
Start: 2021-09-02

## 2021-09-02 RX ORDER — SODIUM CHLORIDE 9 MG/ML
20 INJECTION, SOLUTION INTRAVENOUS ONCE
Status: CANCELLED | OUTPATIENT
Start: 2021-09-02

## 2021-09-02 RX ORDER — ALBUTEROL SULFATE 90 UG/1
3 AEROSOL, METERED RESPIRATORY (INHALATION) ONCE AS NEEDED
Status: DISCONTINUED | OUTPATIENT
Start: 2021-09-02 | End: 2021-09-05 | Stop reason: HOSPADM

## 2021-09-02 RX ORDER — ACETAMINOPHEN 325 MG/1
650 TABLET ORAL ONCE AS NEEDED
Status: DISCONTINUED | OUTPATIENT
Start: 2021-09-02 | End: 2021-09-05 | Stop reason: HOSPADM

## 2021-09-02 RX ORDER — SODIUM CHLORIDE 9 MG/ML
20 INJECTION, SOLUTION INTRAVENOUS ONCE
Status: COMPLETED | OUTPATIENT
Start: 2021-09-02 | End: 2021-09-02

## 2021-09-02 RX ORDER — ONDANSETRON 2 MG/ML
4 INJECTION INTRAMUSCULAR; INTRAVENOUS ONCE AS NEEDED
Status: DISCONTINUED | OUTPATIENT
Start: 2021-09-02 | End: 2021-09-05 | Stop reason: HOSPADM

## 2021-09-02 RX ADMIN — IMDEVIMAB 1200 MG COMBINED: 1332 INJECTION, SOLUTION, CONCENTRATE INTRAVENOUS at 18:44

## 2021-09-02 RX ADMIN — SODIUM CHLORIDE 20 ML/HR: 0.9 INJECTION, SOLUTION INTRAVENOUS at 18:30

## 2021-09-02 NOTE — PROGRESS NOTES
Pt here for Regeneron infusion  Pt given EUA and reviewed  Pt gives verbal consent to proceed with treatment  IV started with no issue and infusing with NSS @ [facility]  Vital signs stable  Pt resting comfortably and has no further questions or concerns  Call bell with in reach

## 2021-09-03 ENCOUNTER — TELEMEDICINE (OUTPATIENT)
Dept: FAMILY MEDICINE CLINIC | Facility: CLINIC | Age: 66
End: 2021-09-03
Payer: COMMERCIAL

## 2021-09-03 DIAGNOSIS — U07.1 COVID-19 VIRUS INFECTION: Primary | ICD-10-CM

## 2021-09-03 PROCEDURE — 99441 PR PHYS/QHP TELEPHONE EVALUATION 5-10 MIN: CPT | Performed by: FAMILY MEDICINE

## 2021-09-03 NOTE — PROGRESS NOTES
COVID-19 Outpatient Progress Note    Assessment/Plan:    Problem List Items Addressed This Visit        Other    COVID-19 virus infection - Primary         Disposition:     Risks and benefits of COVID-19 vaccination was discussed with patient  I recommended continued isolation until at least 24 hours have passed since recovery defined as resolution of fever without the use of fever-reducing medications AND improvement in COVID symptoms AND 10 days have passed since onset of symptoms (or 10 days have passed since date of first positive viral diagnostic test for asymptomatic patients)  I have spent 3 minutes directly with the patient  Greater than 50% of this time was spent in counseling/coordination of care regarding: prognosis, instructions for management and risk factor reductions  Verification of patient location:    Patient is located in the following state in which I hold an active license [place]    Encounter provider Sary Liu MD    Provider located at [place] [address]  [address] [place] [id number]    Recent Visits  Date Type Provider Dept   09/01/21 Telephone Bright Tyson [address]   09/01/21 [address] [facility] [address] [facility]   08/30/21 Telephone [address] recent visits within past 7 days and meeting all other requirements  Today's Visits  Date Type Provider Dept   09/03/21 Telemedicine Sary Liu MD [address] today's visits and meeting all other requirements  Future Appointments  No visits were found meeting these conditions  Showing future appointments within next 150 days and meeting all other requirements     This virtual check-in was done via telephone and he agrees to proceed  Patient agrees to participate in a virtual check in via telephone or video visit instead of presenting to the office to address urgent/immediate medical needs   Patient is aware this is a billable service  After connecting through Telephone, the patient was identified by name and date of birth  Shiv Patel was informed that this was a telemedicine visit and that the exam was being conducted confidentially over secure lines  My office door was closed  No one else was in the room  Shiv Patel acknowledged consent and understanding of privacy and security of the telemedicine visit  I informed the patient that I have reviewed his record in Epic and presented the opportunity for him to ask any questions regarding the visit today  The patient agreed to participate  It was my intent to perform this visit via video technology but the patient was not able to do a video connection so the visit was completed via audio telephone only  Subjective:   Shiv Patel is a 61 y o  male who has been screened for COVID-19  Symptom change since last report: improving  Patient's symptoms include anosmia and loss of taste  Patient denies fever, fatigue, congestion, cough, shortness of breath, nausea, diarrhea and headaches  Date of symptom onset: 8/26/2021  Date of positive COVID-19 PCR: 8/30/2021  COVID-19 vaccination status: Fully vaccinated    Angelina Small has been staying home and has isolated themselves in his home  He is taking care to not share personal items and is cleaning all surfaces that are touched often, like counters, tabletops, and doorknobs using household cleaning sprays or wipes  He is wearing a mask when he leaves his room       Monoclonal Antibody Follow-up Symptom Questionnaire  I feel overall: somewhat better  My breathing is: much better  My fever is: better  My fatigue is: somewhat better    Lab Results   Component Value Date    SARSCOV2 Positive (A) 08/30/2021    [address] Not Detected 11/09/2020     Past Medical History:   Diagnosis Date    Abnormal ECG 5/2021    Had fluttering of my heart    Basal cell carcinoma     GERD (gastroesophageal reflux disease)     Hyperlipidemia     Seasonal allergies     Squamous cell skin cancer 04/26/2021    Left neck SCCIS     Past Surgical History:   Procedure Laterality Date    CHOLECYSTECTOMY      COLONOSCOPY N/A 10/4/2017    Procedure: COLONOSCOPY;  Surgeon: Gregor John MD;  Location: [facility]; Service: Gastroenterology    EAR SURGERY      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)    MOHS SURGERY  05/04/2021    left neck     MA ESOPHAGOGASTRODUODENOSCOPY TRANSORAL DIAGNOSTIC N/A 5/31/2018    Procedure: ESOPHAGOGASTRODUODENOSCOPY (EGD); Surgeon: Gregor John MD;  Location: [facility]; Service: Gastroenterology    SINUS SURGERY      SKIN BIOPSY       Current Outpatient Medications   Medication Sig Dispense Refill    ALPRAZolam (XANAX) 0 25 mg tablet Take 1 tablet (0 25 mg total) by mouth daily as needed for anxiety 30 tablet 2    ASPIRIN LOW DOSE 81 MG EC tablet TAKE 1 TABLET DAILY 90 tablet 3    atorvastatin (LIPITOR) 40 mg tablet TAKE 1 TABLET DAILY 90 tablet 3    fluticasone (FLONASE) 50 mcg/act nasal spray 1 spray into each nostril daily 11 1 mL 1    loratadine (CLARITIN) 10 mg tablet Take 1 tablet (10 mg total) by mouth daily 10 tablet 2    metoprolol succinate (TOPROL-XL) 25 mg 24 hr tablet Take 1 tablet (25 mg total) by mouth daily 90 tablet 3    pantoprazole (PROTONIX) 40 mg tablet Take 1 tablet (40 mg total) by mouth every morning 90 tablet 3     No current facility-administered medications for this visit       Facility-Administered Medications Ordered in Other Visits   Medication Dose Route Frequency Provider Last Rate Last Admin    acetaminophen (TYLENOL) tablet 650 mg  650 mg Oral Once PRN Velvet Rincon MD        albuterol (PROVENTIL HFA,VENTOLIN HFA) inhaler 3 puff  3 puff Inhalation Once PRN Velvet Rincon MD        ondansetron [facility]) injection 4 mg  4 mg Intravenous Once PRN Velvet Rincon MD         No Known Allergies    Review of Systems   Constitutional: Negative for activity change, appetite change, fatigue and fever  HENT: Negative for congestion and ear discharge  Respiratory: Negative for cough and shortness of breath  Cardiovascular: Negative for chest pain and palpitations  Gastrointestinal: Negative for diarrhea and nausea  Musculoskeletal: Negative for arthralgias and back pain  Skin: Negative for color change and rash  Neurological: Negative for dizziness and headaches  Psychiatric/Behavioral: Negative for agitation and behavioral problems  Objective: There were no vitals filed for this visit  Physical Exam  Neurological:      Mental Status: He is alert and oriented to person, place, and time  VIRTUAL VISIT DISCLAIMER    Ruthy Van verbally agrees to participate in [employer]  Pt is aware that [employer] could be limited without vital signs or the ability to perform a full hands-on physical exam  Luca Moreira understands he or the provider may request at any time to terminate the video visit and request the patient to seek care or treatment in person

## 2021-09-23 ENCOUNTER — OFFICE VISIT (OUTPATIENT)
Dept: DERMATOLOGY | Facility: CLINIC | Age: 66
End: 2021-09-23
Payer: COMMERCIAL

## 2021-09-23 VITALS — WEIGHT: 223 LBS | HEIGHT: 69 IN | BODY MASS INDEX: 33.03 KG/M2 | TEMPERATURE: 98.2 F

## 2021-09-23 DIAGNOSIS — Z85.89 HISTORY OF SQUAMOUS CELL CARCINOMA: Primary | ICD-10-CM

## 2021-09-23 DIAGNOSIS — L82.1 SEBORRHEIC KERATOSIS: ICD-10-CM

## 2021-09-23 DIAGNOSIS — Z85.828 HISTORY OF BASAL CELL CARCINOMA: ICD-10-CM

## 2021-09-23 PROCEDURE — 99213 OFFICE O/P EST LOW 20 MIN: CPT | Performed by: DERMATOLOGY

## 2021-09-23 NOTE — PROGRESS NOTES
[facility] Follow Up Note    Patient Name: Tonya Gupta  Encounter Date: 9/23/2021    Today's Chief Concerns:  [employer] Concern #1:  Lesion on leg   Concern #2:     Concern #3:      Current Medications:    Current Outpatient Medications:     ALPRAZolam (XANAX) 0 25 mg tablet, Take 1 tablet (0 25 mg total) by mouth daily as needed for anxiety, Disp: 30 tablet, Rfl: 2    ASPIRIN LOW DOSE 81 MG EC tablet, TAKE 1 TABLET DAILY, Disp: 90 tablet, Rfl: 3    atorvastatin (LIPITOR) 40 mg tablet, TAKE 1 TABLET DAILY, Disp: 90 tablet, Rfl: 3    fluticasone (FLONASE) 50 mcg/act nasal spray, 1 spray into each nostril daily, Disp: 11 1 mL, Rfl: 1    loratadine (CLARITIN) 10 mg tablet, Take 1 tablet (10 mg total) by mouth daily, Disp: 10 tablet, Rfl: 2    metoprolol succinate (TOPROL-XL) 25 mg 24 hr tablet, Take 1 tablet (25 mg total) by mouth daily, Disp: 90 tablet, Rfl: 3    pantoprazole (PROTONIX) 40 mg tablet, Take 1 tablet (40 mg total) by mouth every morning, Disp: 90 tablet, Rfl: 3    CONSTITUTIONAL:   Vitals:    09/23/21 1103   Temp: 98 2 °F (36 8 °C)   Weight: 101 kg (223 lb)   Height: 5' 9" (1 753 m)       Specific Alerts:    Have you been seen by a [facility] Dermatologist in the last 3 years? YES    Are you pregnant or planning to become pregnant? N/A    Are you currently or planning to be nursing or breast feeding? N/A    No Known Allergies    May we call your Preferred Phone number to discuss your specific medical information? YES    May we leave a detailed message that includes your specific medical information? YES    Have you traveled outside of the [facility] in the past 3 months? No    Do you currently have a pacemaker or defibrillator? No    Do you have any artificial heart valves, joints, plates, screws, rods, stents, pins, etc? No   - If Yes, were any placed within the last 2 years? Do you require any medications prior to a surgical procedure?  No   - If Yes, for which procedure? - If Yes, what medications to you require? Are you taking any medications that cause you to bleed more easily ("blood thinners") No    Have you ever experienced a rapid heartbeat with epinephrine? No    Have you ever been treated with "gold" (gold sodium thiomalate) therapy? No    [facility] can help with wrinkles, "laugh lines," facial volume loss, "double chin," "love handles," age spots, and more  Are you interested in learning today about some of the skin enhancement procedures that we offer? (If Yes, please provide more detail) No    Review of Systems:  Have you recently had or currently have any of the following? · Fever or chills: No  · Night Sweats: No  · Headaches: No  · Weight Gain: No  · Weight Loss: No  · Blurry Vision: No  · Nausea: No  · Vomiting: No  · Diarrhea: No  · Blood in Stool: No  · Abdominal Pain: No  · Itchy Skin: No  · Painful Joints: No  · Swollen Joints: No  · Muscle Pain: No  · Irregular Mole: No  · Sun Burn: No  · Dry Skin: No  · Skin Color Changes: No  · Scar or Keloid: No  · Cold Sores/Fever Blisters: No  · Bacterial Infections/MRSA: No  · Anxiety: No  · Depression: No  · Suicidal or Homicidal Thoughts: No      PSYCH: Normal mood and affect  EYES: Normal conjunctiva  ENT: Normal lips and oral mucosa  CARDIOVASCULAR: No edema  RESPIRATORY: Normal respirations  HEME/LYMPH/IMMUNO:  No regional lymphadenopathy except as noted below in ASSESSMENT AND PLAN BY DIAGNOSIS    FULL ORGAN SYSTEM SKIN EXAM (SKIN)   Right Leg, Foot, Toes Normal except as noted below in Assessment       1   HISTORY OF SQUAMOUS CELL CARCINOMA     Physical Exam:   Anatomic Location Affected:  Left neck   Morphological Description of Scar:  Well healed scar   Suspected Recurrence: no   Regional adenopathy: no    Additional History of Present Condition:  Status post Mohs procedure     Assessment and Plan:  Based on a thorough discussion of this condition and the management approach to it (including a comprehensive discussion of the known risks, side effects and potential benefits of treatment), the patient (family) agrees to implement the following specific plan:   Monitor for changes   Apply SPF 50 or higher multiple times a day  Wear sun protecting hats and clothing   Continue annual skin checks with Dermatology  How can SCC be prevented? The most important way to prevent SCC is to avoid sunburn  This is especially important in childhood and early life  Fair skinned individuals and those with a personal or family history of BCC should protect their skin from sun exposure daily, year-round and lifelong   Stay indoors or under the shade in the middle of the day    Wear covering clothing    Apply high protection factor SPF50+ broad-spectrum sunscreens generously to exposed skin if outdoors    Avoid indoor tanning (sun beds, solaria)      What is the outlook for SCC? Most SCC are cured by treatment  Cure is most likely if treatment is undertaken when the lesion is small  A small percent of SCC's can spread to lymph  nodes and long term monitoring is indicated  They are also at increased risk of other skin cancers, especially melanoma  Regular self-skin examinations and long-term annual skin checks by an experienced health professional are recommended        2  HISTORY OF BASAL CELL CARCINOMA    Physical Exam:   Anatomic Location Affected:  Chest   Morphological Description of scar:  Well healed scar   Suspected Recurrence: No      Additional History of Present Condition:  History of basal cell carcinoma with no sign of recurrence    Assessment and Plan:  Based on a thorough discussion of this condition and the management approach to it (including a comprehensive discussion of the known risks, side effects and potential benefits of treatment), the patient (family) agrees to implement the following specific plan:   Monitor for changes   Apply SPF 50 or higher multiple times a day   Wear sun protecting hats and clothing   Continue annual skin checks with Dermatology  How can basal cell carcinoma be prevented? The most important way to prevent BCC is to avoid sunburn  This is especially important in childhood and early life  Fair skinned individuals and those with a personal or family history of BCC should protect their skin from sun exposure daily, year-round and lifelong   Stay indoors or under the shade in the middle of the day    Wear covering clothing    Apply high protection factor SPF50+ broad-spectrum sunscreens generously to exposed skin if outdoors    Avoid indoor tanning (sun beds, solaria)   Oral nicotinamide (vitamin B3) in a dose of 500 mg twice daily may reduce the number and severity of BCCs  What is the outlook for basal cell carcinoma? Most BCCs are cured by treatment  Cure is most likely if treatment is undertaken when the lesion is small  About 50% of people with BCC develop a second one within 3 years of the first  They are also at increased risk of other skin cancers, especially melanoma  Regular self-skin examinations and long-term annual skin checks by an experienced health professional are recommended  3   SEBORRHEIC KERATOSIS; NON-INFLAMED    Physical Exam:   Anatomic Location Affected:  Left shin, right shin   Morphological Description:  Keratotic flat tan homogeneous plaque on left leg, similar lesion on right leg  Additional History of Present Condition:  Patient reports new bumps on the skin  Denies itch, burn, pain, bleeding or ulceration  Present constantly; nothing seems to make it worse or better  No prior treatment        Assessment and Plan:  Based on a thorough discussion of this condition and the management approach to it (including a comprehensive discussion of the known risks, side effects and potential benefits of treatment), the patient (family) agrees to implement the following specific plan:   Reassure benign   Monitor for changes   Return as soon as possible with any new or changing skin lesions  Yearly skin examination, sooner if needed  Precautions against sun exposure  Seborrheic Keratosis  A seborrheic keratosis is a harmless warty spot that appears during adult life as a common sign of skin aging  Seborrheic keratoses can arise on any area of skin, covered or uncovered, with the usual exception of the palms and soles  They do not arise from mucous membranes  Seborrheic keratoses can have highly variable appearance  Seborrheic keratoses are extremely common  It has been estimated that over 90% of adults over the age of 61 years have one or more of them  They occur in males and females of all races, typically beginning to erupt in the 35s or 45s  They are uncommon under the age of 21 years  The precise cause of seborrhoeic keratoses is not known  Seborrhoeic keratoses are considered degenerative in nature  As time goes by, seborrheic keratoses tend to become more numerous  Some people inherit a tendency to develop a very large number of them; some people may have hundreds of them  The name "seborrheic keratosis" is misleading, because these lesions are not limited to a seborrhoeic distribution (scalp, mid-face, chest, upper back), nor are they formed from sebaceous glands, nor are they associated with sebum -- which is greasy  Seborrheic keratosis may also be called "SK," "Seb K," "basal cell papilloma," "senile wart," or "barnacle "      Researchers have noted:   Eruptive seborrhoeic keratoses can follow sunburn or dermatitis   Skin friction may be the reason they appear in body folds   Viral cause (e g , human papillomavirus) seems unlikely   Stable and clonal mutations or activation of FRFR3, PIK3CA, DAISY, AKT1 and EGFR genes are found in seborrhoeic keratoses   Seborrhoeic keratosis can arise from solar lentigo   FRFR3 mutations also arise in solar lentigines   These mutations are associated with increased age and location on the head and neck, suggesting a role of ultraviolet radiation in these lesions   Seborrheic keratoses do not harbour tumour suppressor gene mutations   Epidermal growth factor receptor inhibitors, which are used to treat some cancers, often result in an increase in verrucal (warty) keratoses  There is no easy way to remove multiple lesions on a single occasion  Unless a specific lesion is "inflamed" and is causing pain or stinging/burning or is bleeding, most insurance companies do not authorize treatment            Scribe Attestation    I,:  Paige Paget am acting as a scribe while in the presence of the attending physician :       I,:  José Miguel Vu MD personally performed the services described in this documentation    as scribed in my presence :

## 2021-09-23 NOTE — PATIENT INSTRUCTIONS
1  HISTORY OF SQUAMOUS CELL CARCINOMA     Assessment and Plan:  Based on a thorough discussion of this condition and the management approach to it (including a comprehensive discussion of the known risks, side effects and potential benefits of treatment), the patient (family) agrees to implement the following specific plan:   Monitor for changes   Apply SPF 50 or higher multiple times a day  Wear sun protecting hats and clothing   Continue annual skin checks with Dermatology  How can SCC be prevented? The most important way to prevent SCC is to avoid sunburn  This is especially important in childhood and early life  Fair skinned individuals and those with a personal or family history of BCC should protect their skin from sun exposure daily, year-round and lifelong   Stay indoors or under the shade in the middle of the day    Wear covering clothing    Apply high protection factor SPF50+ broad-spectrum sunscreens generously to exposed skin if outdoors    Avoid indoor tanning (sun beds, solaria)      What is the outlook for SCC? Most SCC are cured by treatment  Cure is most likely if treatment is undertaken when the lesion is small  A small percent of SCC's can spread to lymph  nodes and long term monitoring is indicated  They are also at increased risk of other skin cancers, especially melanoma  Regular self-skin examinations and long-term annual skin checks by an experienced health professional are recommended        2  HISTORY OF BASAL CELL CARCINOMA    Additional History of Present Condition:  History of basal cell carcinoma with no sign of recurrence    Assessment and Plan:  Based on a thorough discussion of this condition and the management approach to it (including a comprehensive discussion of the known risks, side effects and potential benefits of treatment), the patient (family) agrees to implement the following specific plan:   Monitor for changes   Apply SPF 50 or higher multiple times a day   Wear sun protecting hats and clothing   Continue annual skin checks with Dermatology  How can basal cell carcinoma be prevented? The most important way to prevent BCC is to avoid sunburn  This is especially important in childhood and early life  Fair skinned individuals and those with a personal or family history of BCC should protect their skin from sun exposure daily, year-round and lifelong   Stay indoors or under the shade in the middle of the day    Wear covering clothing    Apply high protection factor SPF50+ broad-spectrum sunscreens generously to exposed skin if outdoors    Avoid indoor tanning (sun beds, solaria)   Oral nicotinamide (vitamin B3) in a dose of 500 mg twice daily may reduce the number and severity of BCCs  What is the outlook for basal cell carcinoma? Most BCCs are cured by treatment  Cure is most likely if treatment is undertaken when the lesion is small  About 50% of people with BCC develop a second one within 3 years of the first  They are also at increased risk of other skin cancers, especially melanoma  Regular self-skin examinations and long-term annual skin checks by an experienced health professional are recommended  3   SEBORRHEIC KERATOSIS; NON-INFLAMED     Assessment and Plan:  Based on a thorough discussion of this condition and the management approach to it (including a comprehensive discussion of the known risks, side effects and potential benefits of treatment), the patient (family) agrees to implement the following specific plan:   Reassure benign   Monitor for changes    Seborrheic Keratosis  A seborrheic keratosis is a harmless warty spot that appears during adult life as a common sign of skin aging  Seborrheic keratoses can arise on any area of skin, covered or uncovered, with the usual exception of the palms and soles  They do not arise from mucous membranes  Seborrheic keratoses can have highly variable appearance        Seborrheic keratoses are extremely common  It has been estimated that over 90% of adults over the age of 61 years have one or more of them  They occur in males and females of all races, typically beginning to erupt in the 35s or 45s  They are uncommon under the age of 21 years  The precise cause of seborrhoeic keratoses is not known  Seborrhoeic keratoses are considered degenerative in nature  As time goes by, seborrheic keratoses tend to become more numerous  Some people inherit a tendency to develop a very large number of them; some people may have hundreds of them  The name "seborrheic keratosis" is misleading, because these lesions are not limited to a seborrhoeic distribution (scalp, mid-face, chest, upper back), nor are they formed from sebaceous glands, nor are they associated with sebum -- which is greasy  Seborrheic keratosis may also be called "SK," "Seb K," "basal cell papilloma," "senile wart," or "barnacle "      Researchers have noted:   Eruptive seborrhoeic keratoses can follow sunburn or dermatitis   Skin friction may be the reason they appear in body folds   Viral cause (e g , human papillomavirus) seems unlikely   Stable and clonal mutations or activation of FRFR3, PIK3CA, DAISY, AKT1 and EGFR genes are found in seborrhoeic keratoses   Seborrhoeic keratosis can arise from solar lentigo   FRFR3 mutations also arise in solar lentigines  These mutations are associated with increased age and location on the head and neck, suggesting a role of ultraviolet radiation in these lesions   Seborrheic keratoses do not harbour tumour suppressor gene mutations   Epidermal growth factor receptor inhibitors, which are used to treat some cancers, often result in an increase in verrucal (warty) keratoses  There is no easy way to remove multiple lesions on a single occasion    Unless a specific lesion is "inflamed" and is causing pain or stinging/burning or is bleeding, most insurance companies do not authorize treatment

## 2021-12-16 DIAGNOSIS — B35.6 TINEA CRURIS: Primary | ICD-10-CM

## 2021-12-16 DIAGNOSIS — K21.9 GASTROESOPHAGEAL REFLUX DISEASE WITHOUT ESOPHAGITIS: ICD-10-CM

## 2021-12-16 RX ORDER — PANTOPRAZOLE SODIUM 40 MG/1
TABLET, DELAYED RELEASE ORAL
Qty: 90 TABLET | Refills: 3 | OUTPATIENT
Start: 2021-12-16

## 2021-12-16 RX ORDER — PANTOPRAZOLE SODIUM 40 MG/1
40 TABLET, DELAYED RELEASE ORAL EVERY MORNING
Qty: 30 TABLET | Refills: 0 | Status: SHIPPED | OUTPATIENT
Start: 2021-12-16 | End: 2021-12-22 | Stop reason: SDUPTHER

## 2021-12-16 RX ORDER — TERBINAFINE HYDROCHLORIDE 250 MG/1
250 TABLET ORAL DAILY
Qty: 14 TABLET | Refills: 0 | Status: SHIPPED | OUTPATIENT
Start: 2021-12-16 | End: 2021-12-30

## 2021-12-21 NOTE — TELEPHONE ENCOUNTER
His MRI is normal advised him to follow up with Neurology PROCEDURE NOTE: Intravitreal Ozurdex OS. Diagnosis: Diabetic Macular Edema. Anesthesia: Subconjunctival. Prep: Betadine Drops and Betadine Scrub. Prior to injection, risks/benefits/alternatives discussed including infection, loss of vision, hemorrhage, cataract, glaucoma, retinal tears or detachment and patient wished to proceed. After topical anesthesia, betadine prep was performed. After the Betadine prep, intravitreal injection of Ozurdex 0.7mg was given 3-4 mm from the limbus in the inferotemporal quadrant. A lid speculum was used. Intravitreal injection of Ozurdex 0.7mg was given. Injection site: 3-4 mm from the limbus. Lid speculum removed. Count fingers vision was verified. Patient tolerated procedure well. There were no complications. Count fingers or better vision was verified within 5 minutes of the intravitreal injection prior to the patient leaving the office. CRA perfusion confirmed. Post-op instructions given. Patient given office phone number/answering service number and advised to call immediately should there be an increase in floaters or redness, loss of vision or pain, or should they have any other questions or concerns. Erendira Shahid

## 2021-12-22 ENCOUNTER — OFFICE VISIT (OUTPATIENT)
Dept: GASTROENTEROLOGY | Facility: CLINIC | Age: 66
End: 2021-12-22
Payer: COMMERCIAL

## 2021-12-22 VITALS
HEIGHT: 69 IN | BODY MASS INDEX: 33.77 KG/M2 | SYSTOLIC BLOOD PRESSURE: 138 MMHG | HEART RATE: 71 BPM | WEIGHT: 228 LBS | DIASTOLIC BLOOD PRESSURE: 70 MMHG

## 2021-12-22 DIAGNOSIS — K21.9 GASTROESOPHAGEAL REFLUX DISEASE WITHOUT ESOPHAGITIS: ICD-10-CM

## 2021-12-22 PROCEDURE — 99213 OFFICE O/P EST LOW 20 MIN: CPT | Performed by: PHYSICIAN ASSISTANT

## 2021-12-22 PROCEDURE — 3008F BODY MASS INDEX DOCD: CPT | Performed by: PHYSICIAN ASSISTANT

## 2021-12-22 RX ORDER — PANTOPRAZOLE SODIUM 40 MG/1
40 TABLET, DELAYED RELEASE ORAL EVERY MORNING
Qty: 90 TABLET | Refills: 3 | Status: SHIPPED | OUTPATIENT
Start: 2021-12-22 | End: 2022-03-08 | Stop reason: SDUPTHER

## 2022-01-26 DIAGNOSIS — U07.1 COVID-19 VIRUS INFECTION: ICD-10-CM

## 2022-01-26 RX ORDER — FLUTICASONE PROPIONATE 50 MCG
SPRAY, SUSPENSION (ML) NASAL
Qty: 16 ML | Refills: 1 | Status: SHIPPED | OUTPATIENT
Start: 2022-01-26 | End: 2022-04-07 | Stop reason: ALTCHOICE

## 2022-03-07 ENCOUNTER — TELEPHONE (OUTPATIENT)
Dept: GASTROENTEROLOGY | Facility: CLINIC | Age: 67
End: 2022-03-07

## 2022-03-08 DIAGNOSIS — K21.9 GASTROESOPHAGEAL REFLUX DISEASE WITHOUT ESOPHAGITIS: ICD-10-CM

## 2022-03-08 RX ORDER — PANTOPRAZOLE SODIUM 40 MG/1
40 TABLET, DELAYED RELEASE ORAL 2 TIMES DAILY
Qty: 180 TABLET | Refills: 0 | Status: SHIPPED | OUTPATIENT
Start: 2022-03-08 | End: 2022-05-26

## 2022-03-08 NOTE — TELEPHONE ENCOUNTER
Spoke to patient  He has been having breakthrough heartburn/reflux at night over the past couple of months  He admits to eating dessert later at night and a small weight gain  No dysphagia  No vomiting  No melena  No abdominal pain  He is on Pantoprazole daily  Will increase Pantoprazole to BID x 6-8 weeks and reinforced GERD modifications to follow  If not improving or new symptoms develop - he was instructed to call to schedule a repeat EGD to further investigate  He is currently in [place] but will be back in [place] later  [facility]

## 2022-03-31 ENCOUNTER — TELEPHONE (OUTPATIENT)
Dept: FAMILY MEDICINE CLINIC | Facility: CLINIC | Age: 67
End: 2022-03-31

## 2022-03-31 NOTE — TELEPHONE ENCOUNTER
Patient has an [facility] appointment end of April and wanted you to order any labs that he should have done prior  I notified him to have the labs done after 4/21  He goes to [facility] and will  the orders one day next week  Put in

## 2022-04-07 ENCOUNTER — OFFICE VISIT (OUTPATIENT)
Dept: CARDIOLOGY CLINIC | Facility: CLINIC | Age: 67
End: 2022-04-07
Payer: MEDICARE

## 2022-04-07 VITALS
BODY MASS INDEX: 33.71 KG/M2 | HEIGHT: 69 IN | DIASTOLIC BLOOD PRESSURE: 64 MMHG | SYSTOLIC BLOOD PRESSURE: 114 MMHG | WEIGHT: 227.6 LBS | HEART RATE: 65 BPM | OXYGEN SATURATION: 95 %

## 2022-04-07 DIAGNOSIS — R00.2 PALPITATION: ICD-10-CM

## 2022-04-07 DIAGNOSIS — I47.1 SVT (SUPRAVENTRICULAR TACHYCARDIA) (HCC): Primary | ICD-10-CM

## 2022-04-07 PROCEDURE — 99213 OFFICE O/P EST LOW 20 MIN: CPT | Performed by: INTERNAL MEDICINE

## 2022-04-07 NOTE — PROGRESS NOTES
[facility] [address] [place] [id number]  Cardiology Follow Up    Joe Garcia  1955  [id number]      1  SVT (supraventricular tachycardia) ([address] )     2  Palpitation         Chief Complaint   Patient presents with    Follow-up     Routine follow up       Interval History:  Patient presents for follow-up visit  Patient denies any history of chest pain shortness of breath  Patient denies any history of leg edema or orthopnea PND  No history of presyncope syncope  Patient states compliance with the present list of medications  Patient does have history of palpitations and they are infrequent and better than before  Patient is on beta-blockers  Patient is thinking of getting Kardiamobile       Patient Active Problem List   Diagnosis    Gastroesophageal reflux disease without esophagitis    Hyperlipidemia    Prehypertension    Diabetes mellitus screening    Prostate cancer screening    Dyspepsia    Serum total bilirubin elevated    Right hip pain    Impaired fasting glucose    Memory loss    Anxiety    Behavioral change    Memory difficulty    Palpitation    PAC (premature atrial contraction)    COVID-19 virus infection     Past Medical History:   Diagnosis Date    Abnormal ECG 2021    Had fluttering of my heart    Basal cell carcinoma     GERD (gastroesophageal reflux disease)     Hyperlipidemia     Seasonal allergies     Squamous cell skin cancer 2021    Left neck SCCIS     Social History     Socioeconomic History    Marital status: /Civil Union     Spouse name: Not on file    Number of children: Not on file    Years of education: Not on file    Highest education level: Not on file   Occupational History    Not on file   Tobacco Use    Smoking status: Former Smoker     Packs/day: 1 00     Years: 3 00     Pack years: 3 00     Types: Cigarettes     Start date: 1970     Quit date:      Years since quittin 2    Smokeless tobacco: Never Used   Vaping Use    Vaping Use: Never used   Substance and Sexual Activity    Alcohol use: Yes     Alcohol/week: 3 0 standard drinks     Types: 3 Glasses of wine per week    Drug use: No    Sexual activity: Yes     Partners: Female     Birth control/protection: Female Sterilization   Other Topics Concern    Not on file   Social History Narrative    Not on file     Social Determinants of Health     Financial Resource Strain: Not on file   Food Insecurity: Not on file   Transportation Needs: Not on file   Physical Activity: Not on file   Stress: Not on file   Social Connections: Not on file   Intimate Partner Violence: Not on file   Housing Stability: Not on file      Family History   Problem Relation Age of Onset    Colon cancer Mother     Other Mother         septicemia    Hypertension Father     Heart attack Father         NSTEMI    Hypertension Sister     Squamous cell carcinoma Sister     Hypertension Brother         Has defibulator and pace maker    Cancer Brother     Basal cell carcinoma Brother      Past Surgical History:   Procedure Laterality Date    CHOLECYSTECTOMY      COLONOSCOPY N/A 10/4/2017    Procedure: COLONOSCOPY;  Surgeon: Jag Cedillo MD;  Location: [facility]; Service: Gastroenterology    EAR SURGERY      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)    MOHS SURGERY  05/04/2021    left neck     GA ESOPHAGOGASTRODUODENOSCOPY TRANSORAL DIAGNOSTIC N/A 5/31/2018    Procedure: ESOPHAGOGASTRODUODENOSCOPY (EGD); Surgeon: Jag Cedillo MD;  Location[facility];   Service: Gastroenterology    SINUS SURGERY      SKIN BIOPSY         Current Outpatient Medications:     ALPRAZolam (XANAX) 0 25 mg tablet, Take 1 tablet (0 25 mg total) by mouth daily as needed for anxiety, Disp: 30 tablet, Rfl: 2    ASPIRIN LOW DOSE 81 MG EC tablet, TAKE 1 TABLET DAILY, Disp: 90 tablet, Rfl: 3    atorvastatin (LIPITOR) 40 mg tablet, TAKE 1 TABLET DAILY, Disp: 90 tablet, Rfl: 3    Clotrimazole 1 % OINT, Apply topically 2 (two) times a day Apply to groin area, Disp: 56 7 g, Rfl: 2    metoprolol succinate (TOPROL-XL) 25 mg 24 hr tablet, Take 1 tablet (25 mg total) by mouth daily, Disp: 90 tablet, Rfl: 3    pantoprazole (PROTONIX) 40 mg tablet, Take 1 tablet (40 mg total) by mouth 2 (two) times a day, Disp: 180 tablet, Rfl: 0    fluticasone (FLONASE) 50 mcg/act nasal spray, SPRAY 1 SPRAY INTO EACH NOSTRIL EVERY DAY, Disp: 16 mL, Rfl: 1    loratadine (CLARITIN) 10 mg tablet, Take 1 tablet (10 mg total) by mouth daily, Disp: 10 tablet, Rfl: 2  No Known Allergies    Labs:  No visits with results within 2 Month(s) from this visit  Latest known visit with results is:   Orders Only on 08/30/2021   Component Date Value    SARS-CoV-2 08/30/2021 Positive*     Imaging: No results found  Review of Systems:  Review of Systems   REVIEW OF SYSTEMS:  Constitutional:  Denies fever or chills   Eyes:  Denies change in visual acuity   HENT:  Denies nasal congestion or sore throat   Respiratory:  Denies cough or shortness of breath   Cardiovascular:  Denies chest pain or edema   GI:  Denies abdominal pain, nausea, vomiting, bloody stools or diarrhea   :  Denies dysuria, frequency, difficulty in micturition and nocturia  Musculoskeletal:  Denies back pain or joint pain   Neurologic:  Denies headache, focal weakness or sensory changes   Endocrine:  Denies polyuria or polydipsia   Lymphatic:  Denies swollen glands   Psychiatric:   anxiety     Physical Exam:    /64 (BP Location: Left arm, Patient Position: Sitting, Cuff Size: Standard)   Pulse 65   Ht 5' 9" (1 753 m)   Wt 103 kg (227 lb 9 6 oz)   SpO2 95%   BMI 33 61 kg/m²     Physical Exam   PHYSICAL EXAM:  General:  Patient is not in acute distress   Head: Normocephalic, Atraumatic  HEENT:  Both pupils normal-size atraumatic, normocephalic, nonicteric  Neck:  JVP not raised   Trachea central  No carotid bruit  Respiratory:  normal breath sounds no crackles  no rhonchi  Cardiovascular:  Regular rate and rhythm no S3 no murmurs  GI:  Abdomen soft nontender  No organomegaly  Lymphatic:  No cervical or inguinal lymphadenopathy  Neurologic:  Patient is awake alert, oriented   Grossly nonfocal  Extremities no edema    Discussion/Summary:  Patient has history of PAT/SVT and has done reasonably well  Patient is on beta-blockers  Lengthy discussion with the patient regarding etiology and pathogenesis of atrial arrhythmia  Patient encouraged to get Kardiamobile to document any symptoms he has with the arrhythmias  Patient instructed to take an extra metoprolol if he has more frequent palpitations  Patient also given options of ablation down the line in case he has frequent episodes of palpitations which are affecting his quality of life  Patient does have history of anxiety and takes alprazolam as needed  Follow-up with primary care physician  Followup in 1 year or earlier as needed

## 2022-04-14 LAB
ALBUMIN SERPL-MCNC: 4.4 G/DL (ref 3.8–4.8)
ALBUMIN/GLOB SERPL: 2 {RATIO} (ref 1.2–2.2)
ALP SERPL-CCNC: 69 IU/L (ref 44–121)
ALT SERPL-CCNC: 30 IU/L (ref 0–44)
AST SERPL-CCNC: 20 IU/L (ref 0–40)
BILIRUB SERPL-MCNC: 0.7 MG/DL (ref 0–1.2)
BUN SERPL-MCNC: 14 MG/DL (ref 8–27)
BUN/CREAT SERPL: 13 (ref 10–24)
CALCIUM SERPL-MCNC: 9.5 MG/DL (ref 8.6–10.2)
CHLORIDE SERPL-SCNC: 104 MMOL/L (ref 96–106)
CHOLEST SERPL-MCNC: 163 MG/DL (ref 100–199)
CO2 SERPL-SCNC: 23 MMOL/L (ref 20–29)
CREAT SERPL-MCNC: 1.12 MG/DL (ref 0.76–1.27)
EGFR: 73 ML/MIN/1.73
GLOBULIN SER-MCNC: 2.2 G/DL (ref 1.5–4.5)
GLUCOSE SERPL-MCNC: 123 MG/DL (ref 65–99)
HBA1C MFR BLD: 6.1 % (ref 4.8–5.6)
HDLC SERPL-MCNC: 40 MG/DL
LDLC SERPL CALC-MCNC: 103 MG/DL (ref 0–99)
POTASSIUM SERPL-SCNC: 4.5 MMOL/L (ref 3.5–5.2)
PROT SERPL-MCNC: 6.6 G/DL (ref 6–8.5)
PSA SERPL-MCNC: 2.5 NG/ML (ref 0–4)
SL AMB REFLEX CRITERIA: NORMAL
SL AMB VLDL CHOLESTEROL CALC: 20 MG/DL (ref 5–40)
SODIUM SERPL-SCNC: 144 MMOL/L (ref 134–144)
TRIGL SERPL-MCNC: 109 MG/DL (ref 0–149)

## 2022-04-25 ENCOUNTER — OFFICE VISIT (OUTPATIENT)
Dept: FAMILY MEDICINE CLINIC | Facility: CLINIC | Age: 67
End: 2022-04-25
Payer: MEDICARE

## 2022-04-25 VITALS
WEIGHT: 226 LBS | BODY MASS INDEX: 33.47 KG/M2 | DIASTOLIC BLOOD PRESSURE: 70 MMHG | OXYGEN SATURATION: 97 % | SYSTOLIC BLOOD PRESSURE: 132 MMHG | HEIGHT: 69 IN | HEART RATE: 70 BPM

## 2022-04-25 DIAGNOSIS — B35.6 TINEA CRURIS: ICD-10-CM

## 2022-04-25 DIAGNOSIS — F41.9 ANXIETY: Primary | ICD-10-CM

## 2022-04-25 DIAGNOSIS — Z00.00 MEDICARE ANNUAL WELLNESS VISIT, INITIAL: ICD-10-CM

## 2022-04-25 DIAGNOSIS — Z23 NEED FOR PNEUMOCOCCAL VACCINATION: ICD-10-CM

## 2022-04-25 PROBLEM — R03.0 PREHYPERTENSION: Status: RESOLVED | Noted: 2018-05-02 | Resolved: 2022-04-25

## 2022-04-25 PROCEDURE — 90677 PCV20 VACCINE IM: CPT

## 2022-04-25 PROCEDURE — 1123F ACP DISCUSS/DSCN MKR DOCD: CPT

## 2022-04-25 PROCEDURE — 99213 OFFICE O/P EST LOW 20 MIN: CPT | Performed by: FAMILY MEDICINE

## 2022-04-25 PROCEDURE — G0402 INITIAL PREVENTIVE EXAM: HCPCS | Performed by: FAMILY MEDICINE

## 2022-04-25 RX ORDER — ALPRAZOLAM 0.25 MG/1
0.25 TABLET ORAL DAILY PRN
Qty: 30 TABLET | Refills: 2 | Status: SHIPPED | OUTPATIENT
Start: 2022-04-25

## 2022-04-25 RX ORDER — PRENATAL VIT 91/IRON/FOLIC/DHA 28-975-200
COMBINATION PACKAGE (EA) ORAL 2 TIMES DAILY
Qty: 30 G | Refills: 2 | Status: SHIPPED | OUTPATIENT
Start: 2022-04-25 | End: 2022-08-09 | Stop reason: ALTCHOICE

## 2022-04-25 RX ORDER — TERBINAFINE HYDROCHLORIDE 250 MG/1
250 TABLET ORAL DAILY
Qty: 14 TABLET | Refills: 0 | Status: SHIPPED | OUTPATIENT
Start: 2022-04-25 | End: 2022-05-09

## 2022-04-25 NOTE — PROGRESS NOTES
Assessment and Plan:     Problem List Items Addressed This Visit     None           Preventive health issues were discussed with patient, and age appropriate screening tests were ordered as noted in patient's After Visit Summary  Personalized health advice and appropriate referrals for health education or preventive services given if needed, as noted in patient's After Visit Summary  History of Present Illness:     Patient presents for Welcome to Medicare visit  Patient Care Team:  Dale Keller MD as PCP - General (Family Medicine)  Matilda Villar MD as Endoscopist     Review of Systems:     Review of Systems   Problem List:     Patient Active Problem List   Diagnosis    Gastroesophageal reflux disease without esophagitis    Hyperlipidemia    Prehypertension    Diabetes mellitus screening    Prostate cancer screening    Dyspepsia    Serum total bilirubin elevated    Right hip pain    Impaired fasting glucose    Memory loss    Anxiety    Behavioral change    Memory difficulty    Palpitation    PAC (premature atrial contraction)    COVID-19 virus infection      Past Medical and Surgical History:     Past Medical History:   Diagnosis Date    Abnormal ECG 5/2021    Had fluttering of my heart    Basal cell carcinoma     GERD (gastroesophageal reflux disease)     Hyperlipidemia     Seasonal allergies     Squamous cell skin cancer 04/26/2021    Left neck SCCIS     Past Surgical History:   Procedure Laterality Date    CHOLECYSTECTOMY      COLONOSCOPY N/A 10/4/2017    Procedure: COLONOSCOPY;  Surgeon: Pepe Walsh MD;  [facility]; Service: Gastroenterology    EAR SURGERY      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)    MOHS SURGERY  05/04/2021    left neck     IN ESOPHAGOGASTRODUODENOSCOPY TRANSORAL DIAGNOSTIC N/A 5/31/2018    Procedure: ESOPHAGOGASTRODUODENOSCOPY (EGD); Surgeon: Pepe Walsh MD;  [facility];   Service: Gastroenterology    SINUS SURGERY      SKIN BIOPSY        Family History:     Family History   Problem Relation Age of Onset    Colon cancer Mother     Other Mother         septicemia    Hypertension Father     Heart attack Father         NSTEMI    Hypertension Sister     Squamous cell carcinoma Sister     Hypertension Brother         Has defibulator and pace maker    Cancer Brother     Basal cell carcinoma Brother       Social History:     Social History     Socioeconomic History    Marital status: /Civil Union     Spouse name: Not on file    Number of children: Not on file    Years of education: Not on file    Highest education level: Not on file   Occupational History    Not on file   Tobacco Use    Smoking status: Former Smoker     Packs/day: 1 00     Years: 3 00     Pack years: 3 00     Types: Cigarettes     Start date: 1970     Quit date:      Years since quittin 3    Smokeless tobacco: Never Used   Vaping Use    Vaping Use: Never used   Substance and Sexual Activity    Alcohol use:  Yes     Alcohol/week: 3 0 standard drinks     Types: 3 Glasses of wine per week    Drug use: No    Sexual activity: Yes     Partners: Female     Birth control/protection: Female Sterilization   Other Topics Concern    Not on file   Social History Narrative    Not on file     Social Determinants of Health     Financial Resource Strain: Not on file   Food Insecurity: Not on file   Transportation Needs: Not on file   Physical Activity: Not on file   Stress: Not on file   Social Connections: Not on file   Intimate Partner Violence: Not on file   Housing Stability: Not on file      Medications and Allergies:     Current Outpatient Medications   Medication Sig Dispense Refill    ALPRAZolam (XANAX) 0 25 mg tablet Take 1 tablet (0 25 mg total) by mouth daily as needed for anxiety 30 tablet 2    ASPIRIN LOW DOSE 81 MG EC tablet TAKE 1 TABLET DAILY 90 tablet 3    atorvastatin (LIPITOR) 40 mg tablet TAKE 1 TABLET DAILY 90 tablet 3    Clotrimazole 1 % OINT Apply topically 2 (two) times a day Apply to groin area 56 7 g 2    metoprolol succinate (TOPROL-XL) 25 mg 24 hr tablet Take 1 tablet (25 mg total) by mouth daily 90 tablet 3    pantoprazole (PROTONIX) 40 mg tablet Take 1 tablet (40 mg total) by mouth 2 (two) times a day 180 tablet 0     No current facility-administered medications for this visit  No Known Allergies   Immunizations:     Immunization History   Administered Date(s) Administered    COVID-19 MODERNA VACC 0 5 ML IM 03/23/2021, 04/20/2021    Influenza, recombinant, quadrivalent,injectable, preservative free 10/26/2020    Influenza, seasonal, injectable [birth year]    Tdap 04/22/2016      Health Maintenance:         Topic Date Due    HIV Screening  Never done    Colorectal Cancer Screening  10/04/2022    Hepatitis C Screening  Completed         Topic Date Due    Influenza Vaccine (1) 09/01/2021    COVID-19 Vaccine (3 - Booster for Moderna series) 09/20/2021    Pneumococcal Vaccine: 65+ Years (1 of 1 - PPSV23) Never done      Medicare Screening Tests and Risk Assessments:     Matilda Patricio is here for his Initial Wellness visit  Health Risk Assessment:   Patient rates overall health as excellent  Patient feels that their physical health rating is same  Patient is satisfied with their life  Eyesight was rated as slightly worse  Hearing was rated as same  Patient feels that their emotional and mental health rating is same  Patients states they are sometimes angry  Patient states they are never, rarely unusually tired/fatigued  Pain experienced in the last 7 days has been some  Patient states that he has experienced no weight loss or gain in last 6 months  Depression Screening:   PHQ-2 Score: 0      Fall Risk Screening:    In the past year, patient has experienced: no history of falling in past year      Home Safety:  Patient does not have trouble with stairs inside or outside of their home  Patient has working smoke alarms and has working carbon monoxide detector  Home safety hazards include: none  Nutrition:   Current diet is Regular  Medications:   Patient is currently taking over-the-counter supplements  OTC medications include: see medication list  Patient is able to manage medications  Activities of Daily Living (ADLs)/Instrumental Activities of Daily Living (IADLs):   Walk and transfer into and out of bed and chair?: Yes  Dress and groom yourself?: Yes    Bathe or shower yourself?: Yes    Feed yourself? Yes  Do your laundry/housekeeping?: Yes  Manage your money, pay your bills and track your expenses?: Yes  Make your own meals?: Yes    Do your own shopping?: Yes    Previous Hospitalizations:   Any hospitalizations or ED visits within the last 12 months?: No      Advance Care Planning:     Five wishes given: No      PREVENTIVE SCREENINGS      Cardiovascular Screening:    General: History Lipid Disorder and Screening Current      Diabetes Screening:     General: Screening Current      Colorectal Cancer Screening:     General: Screening Current      Prostate Cancer Screening:    General: Screening Current      Osteoporosis Screening:    General: Screening Not Indicated      Abdominal Aortic Aneurysm (AAA) Screening:    Risk factors include: age between 73-69 yo and tobacco use        Lung Cancer Screening:     General: Screening Not Indicated      Hepatitis C Screening:    General: Screening Current    Hep C Screening Accepted: No     Screening, Brief Intervention, and Referral to Treatment (SBIRT)    Screening  Typical number of drinks in a day: 0  Typical number of drinks in a week: 3  Interpretation: Low risk drinking behavior      Single Item Drug Screening:  How often have you used an illegal drug (including marijuana) or a prescription medication for non-medical reasons in the past year? never    Single Item Drug Screen Score: 0  Interpretation: Negative screen for possible drug use disorder    No exam data present     Physical Exam:     There were no vitals taken for this visit      Physical Exam     Deidra Morgan MD

## 2022-04-25 NOTE — PROGRESS NOTES
Assessment/Plan:    No problem-specific Assessment & Plan notes found for this encounter  Diagnoses and all orders for this visit:    Anxiety  -     ALPRAZolam (XANAX) 0 25 mg tablet; Take 1 tablet (0 25 mg total) by mouth daily as needed for anxiety    Tinea cruris  After discussing risks and benefits of medication along with side effects will start the following:  -     terbinafine (LamISIL) 1 % cream; Apply topically 2 (two) times a day  -     terbinafine (LamISIL) 250 mg tablet; Take 1 tablet (250 mg total) by mouth daily for 14 days    Medicare annual wellness visit, initial  See Medicare wellness note  Follow up in 6 months or as needed        Subjective:      Patient ID: Darshan Hunt is a 74 y o  male  Patient is here for a follow up  He has a Hx of anxiety and takes Xanax as needed  Also has a rash on his groin are very itchy  antifungal creams helped in the past       The following portions of the patient's history were reviewed and updated as appropriate:   He  has a past medical history of Abnormal ECG (5/2021), Basal cell carcinoma, GERD (gastroesophageal reflux disease), Hyperlipidemia, Seasonal allergies, and Squamous cell skin cancer (04/26/2021)    He   Patient Active Problem List    Diagnosis Date Noted    Tinea cruris 04/25/2022    Medicare annual wellness visit, initial 04/25/2022    COVID-19 virus infection 09/01/2021    Palpitation 04/22/2021    PAC (premature atrial contraction) 04/22/2021    Memory difficulty 08/12/2019    Memory loss 07/12/2019    Anxiety 07/12/2019    Behavioral change 07/12/2019    Right hip pain 04/15/2019    Impaired fasting glucose 04/15/2019    Dyspepsia 05/15/2018    Serum total bilirubin elevated 05/15/2018    Gastroesophageal reflux disease without esophagitis 05/02/2018    Hyperlipidemia 05/02/2018    Diabetes mellitus screening 05/02/2018    Prostate cancer screening 05/02/2018     He  has a past surgical history that includes Cholecystectomy; Sinus surgery; EAR SURGERY; Colonoscopy (N/A, 10/4/2017); pr esophagogastroduodenoscopy transoral diagnostic (N/A, 5/31/2018); Mohs surgery (05/04/2021); and Skin biopsy  His family history includes Basal cell carcinoma in his brother; Cancer in his brother; Colon cancer in his mother; Heart attack in his father; Hypertension in his brother, father, and sister; Other in his mother; Squamous cell carcinoma in his sister  He  reports that he quit smoking about 49 years ago  His smoking use included cigarettes  He started smoking about 52 years ago  He has a 3 00 pack-year smoking history  He has never used smokeless tobacco  He reports current alcohol use of about 3 0 standard drinks of alcohol per week  He reports that he does not use drugs  Current Outpatient Medications   Medication Sig Dispense Refill    ALPRAZolam (XANAX) 0 25 mg tablet Take 1 tablet (0 25 mg total) by mouth daily as needed for anxiety 30 tablet 2    ASPIRIN LOW DOSE 81 MG EC tablet TAKE 1 TABLET DAILY 90 tablet 3    atorvastatin (LIPITOR) 40 mg tablet TAKE 1 TABLET DAILY 90 tablet 3    Clotrimazole 1 % OINT Apply topically 2 (two) times a day Apply to groin area 56 7 g 2    metoprolol succinate (TOPROL-XL) 25 mg 24 hr tablet Take 1 tablet (25 mg total) by mouth daily 90 tablet 3    pantoprazole (PROTONIX) 40 mg tablet Take 1 tablet (40 mg total) by mouth 2 (two) times a day 180 tablet 0    terbinafine (LamISIL) 1 % cream Apply topically 2 (two) times a day 30 g 2    terbinafine (LamISIL) 250 mg tablet Take 1 tablet (250 mg total) by mouth daily for 14 days 14 tablet 0     No current facility-administered medications for this visit       Current Outpatient Medications on File Prior to Visit   Medication Sig    ASPIRIN LOW DOSE 81 MG EC tablet TAKE 1 TABLET DAILY    atorvastatin (LIPITOR) 40 mg tablet TAKE 1 TABLET DAILY    Clotrimazole 1 % OINT Apply topically 2 (two) times a day Apply to groin area    metoprolol succinate (TOPROL-XL) 25 mg 24 hr tablet Take 1 tablet (25 mg total) by mouth daily    pantoprazole (PROTONIX) 40 mg tablet Take 1 tablet (40 mg total) by mouth 2 (two) times a day    [DISCONTINUED] ALPRAZolam (XANAX) 0 25 mg tablet Take 1 tablet (0 25 mg total) by mouth daily as needed for anxiety     No current facility-administered medications on file prior to visit  He has No Known Allergies       Review of Systems   Constitutional: Negative for activity change, appetite change, fatigue and fever  HENT: Negative for congestion and ear discharge  Respiratory: Negative for cough and shortness of breath  Cardiovascular: Negative for chest pain and palpitations  Gastrointestinal: Negative for diarrhea and nausea  Musculoskeletal: Negative for arthralgias and back pain  Skin: Positive for color change and rash  Neurological: Negative for dizziness and headaches  Psychiatric/Behavioral: Negative for agitation and behavioral problems  Objective:      /70   Pulse 70   Ht 5' 9" (1 753 m)   Wt 103 kg (226 lb)   SpO2 97%   BMI 33 37 kg/m²          Physical Exam  Constitutional:       General: He is not in acute distress  Appearance: He is well-developed  He is not diaphoretic  Eyes:      General: No scleral icterus  Pupils: Pupils are equal, round, and reactive to light  Cardiovascular:      Rate and Rhythm: Normal rate and regular rhythm  Heart sounds: Normal heart sounds  No murmur heard  Pulmonary:      Effort: Pulmonary effort is normal  No respiratory distress  Breath sounds: Normal breath sounds  No wheezing  Abdominal:      General: Bowel sounds are normal  There is no distension  Palpations: Abdomen is soft  Tenderness: There is no abdominal tenderness  Skin:     General: Skin is warm and dry  Findings: Erythema and rash present  Neurological:      Mental Status: He is alert and oriented to person, place, and time

## 2022-05-02 DIAGNOSIS — E78.5 HYPERLIPIDEMIA, UNSPECIFIED HYPERLIPIDEMIA TYPE: ICD-10-CM

## 2022-05-02 RX ORDER — ATORVASTATIN CALCIUM 40 MG/1
TABLET, FILM COATED ORAL
Qty: 90 TABLET | Refills: 3 | Status: SHIPPED | OUTPATIENT
Start: 2022-05-02

## 2022-05-26 DIAGNOSIS — K21.9 GASTROESOPHAGEAL REFLUX DISEASE WITHOUT ESOPHAGITIS: ICD-10-CM

## 2022-05-26 RX ORDER — PANTOPRAZOLE SODIUM 40 MG/1
TABLET, DELAYED RELEASE ORAL
Qty: 180 TABLET | Refills: 3 | Status: SHIPPED | OUTPATIENT
Start: 2022-05-26

## 2022-06-09 DIAGNOSIS — I47.1 SVT (SUPRAVENTRICULAR TACHYCARDIA) (HCC): ICD-10-CM

## 2022-06-09 DIAGNOSIS — R00.2 PALPITATION: ICD-10-CM

## 2022-06-09 RX ORDER — METOPROLOL SUCCINATE 25 MG/1
TABLET, EXTENDED RELEASE ORAL
Qty: 90 TABLET | Refills: 3 | Status: SHIPPED | OUTPATIENT
Start: 2022-06-09 | End: 2022-08-09

## 2022-06-28 ENCOUNTER — OFFICE VISIT (OUTPATIENT)
Dept: FAMILY MEDICINE CLINIC | Facility: CLINIC | Age: 67
End: 2022-06-28
Payer: MEDICARE

## 2022-06-28 VITALS
OXYGEN SATURATION: 94 % | TEMPERATURE: 97.8 F | SYSTOLIC BLOOD PRESSURE: 128 MMHG | HEIGHT: 69 IN | DIASTOLIC BLOOD PRESSURE: 84 MMHG | HEART RATE: 78 BPM | BODY MASS INDEX: 33.5 KG/M2 | WEIGHT: 226.2 LBS

## 2022-06-28 DIAGNOSIS — M79.622 AXILLARY TENDERNESS, LEFT: Primary | ICD-10-CM

## 2022-06-28 PROCEDURE — 99214 OFFICE O/P EST MOD 30 MIN: CPT | Performed by: FAMILY MEDICINE

## 2022-06-28 NOTE — PROGRESS NOTES
Assessment/Plan:    No problem-specific Assessment & Plan notes found for this encounter  Diagnoses and all orders for this visit:    Axillary tenderness, left  -     US axilla; Future      Follow up as needed    Subjective:      Patient ID: Jodi Elizabeth is a 74 y o  male  Patient is here because he has been having left axilla tenderness  He does not feel any lumps in that area  Having tenderness for the past 2 weeks  No injuries to that area  The following portions of the patient's history were reviewed and updated as appropriate:   He  has a past medical history of Abnormal ECG (5/2021), Basal cell carcinoma, GERD (gastroesophageal reflux disease), Hyperlipidemia, Seasonal allergies, and Squamous cell skin cancer (04/26/2021)  He   Patient Active Problem List    Diagnosis Date Noted    Axillary tenderness, left 06/28/2022    Tinea cruris 04/25/2022    Medicare annual wellness visit, initial 04/25/2022    COVID-19 virus infection 09/01/2021    Palpitation 04/22/2021    PAC (premature atrial contraction) 04/22/2021    Memory difficulty 08/12/2019    Memory loss 07/12/2019    Anxiety 07/12/2019    Behavioral change 07/12/2019    Right hip pain 04/15/2019    Impaired fasting glucose 04/15/2019    Dyspepsia 05/15/2018    Serum total bilirubin elevated 05/15/2018    Gastroesophageal reflux disease without esophagitis 05/02/2018    Hyperlipidemia 05/02/2018    Diabetes mellitus screening 05/02/2018    Prostate cancer screening 05/02/2018     He  has a past surgical history that includes Cholecystectomy; Sinus surgery; EAR SURGERY; Colonoscopy (N/A, 10/4/2017); pr esophagogastroduodenoscopy transoral diagnostic (N/A, 5/31/2018); Mohs surgery (05/04/2021); and Skin biopsy  His family history includes Basal cell carcinoma in his brother; Cancer in his brother; Colon cancer in his mother; Heart attack in his father; Hypertension in his brother, father, and sister;  Other in his mother; Squamous cell carcinoma in his sister  He  reports that he quit smoking about 49 years ago  His smoking use included cigarettes  He started smoking about 52 years ago  He has a 3 00 pack-year smoking history  He has never used smokeless tobacco  He reports current alcohol use of about 3 0 standard drinks of alcohol per week  He reports that he does not use drugs  Current Outpatient Medications   Medication Sig Dispense Refill    ALPRAZolam (XANAX) 0 25 mg tablet Take 1 tablet (0 25 mg total) by mouth daily as needed for anxiety 30 tablet 2    ASPIRIN LOW DOSE 81 MG EC tablet TAKE 1 TABLET DAILY 90 tablet 3    atorvastatin (LIPITOR) 40 mg tablet TAKE 1 TABLET DAILY 90 tablet 3    Clotrimazole 1 % OINT Apply topically 2 (two) times a day Apply to groin area 56 7 g 2    metoprolol succinate (TOPROL-XL) 25 mg 24 hr tablet TAKE 1 TABLET DAILY 90 tablet 3    pantoprazole (PROTONIX) 40 mg tablet TAKE 1 TABLET TWICE A  tablet 3    terbinafine (LamISIL) 1 % cream Apply topically 2 (two) times a day 30 g 2     No current facility-administered medications for this visit  Current Outpatient Medications on File Prior to Visit   Medication Sig    ALPRAZolam (XANAX) 0 25 mg tablet Take 1 tablet (0 25 mg total) by mouth daily as needed for anxiety    ASPIRIN LOW DOSE 81 MG EC tablet TAKE 1 TABLET DAILY    atorvastatin (LIPITOR) 40 mg tablet TAKE 1 TABLET DAILY    Clotrimazole 1 % OINT Apply topically 2 (two) times a day Apply to groin area    metoprolol succinate (TOPROL-XL) 25 mg 24 hr tablet TAKE 1 TABLET DAILY    pantoprazole (PROTONIX) 40 mg tablet TAKE 1 TABLET TWICE A DAY    terbinafine (LamISIL) 1 % cream Apply topically 2 (two) times a day     No current facility-administered medications on file prior to visit  He has No Known Allergies       Review of Systems   Constitutional: Negative for activity change, appetite change, fatigue and fever  HENT: Negative for congestion and ear discharge  Respiratory: Negative for cough and shortness of breath  Cardiovascular: Negative for chest pain and palpitations  Gastrointestinal: Negative for diarrhea and nausea  Musculoskeletal: Positive for arthralgias and myalgias  Negative for back pain  Skin: Negative for color change and rash  Neurological: Negative for dizziness and headaches  Psychiatric/Behavioral: Negative for agitation and behavioral problems  Objective:      /84 (BP Location: Left arm, Patient Position: Sitting)   Pulse 78   Temp 97 8 °F (36 6 °C) (Temporal)   Ht 5' 9" (1 753 m)   Wt 103 kg (226 lb 3 2 oz)   SpO2 94%   BMI 33 40 kg/m²          Physical Exam  Constitutional:       General: He is not in acute distress  Appearance: He is well-developed  He is not diaphoretic  Eyes:      General: No scleral icterus  Pupils: Pupils are equal, round, and reactive to light  Cardiovascular:      Rate and Rhythm: Normal rate and regular rhythm  Heart sounds: Normal heart sounds  No murmur heard  Pulmonary:      Effort: Pulmonary effort is normal  No respiratory distress  Breath sounds: Normal breath sounds  No wheezing  Chest:      Comments: Left axilla tenderness  Abdominal:      General: Bowel sounds are normal  There is no distension  Palpations: Abdomen is soft  Tenderness: There is no abdominal tenderness  Musculoskeletal:         General: Tenderness present  Comments: FROM of left shoulder  No shoulder tenderness  Empty can test negative   Skin:     General: Skin is warm and dry  Findings: No rash  Neurological:      Mental Status: He is alert and oriented to person, place, and time

## 2022-06-28 NOTE — PROGRESS NOTES
BMI Counseling: Body mass index is 33 4 kg/m²  The BMI is above normal  Nutrition recommendations include reducing portion sizes, decreasing overall calorie intake and 3-5 servings of fruits/vegetables daily  Exercise recommendations include exercising 3-5 times per week

## 2022-06-30 ENCOUNTER — HOSPITAL ENCOUNTER (OUTPATIENT)
Dept: ULTRASOUND IMAGING | Facility: CLINIC | Age: 67
Discharge: HOME | End: 2022-06-30
Payer: MEDICARE

## 2022-06-30 DIAGNOSIS — M79.622 AXILLARY TENDERNESS, LEFT: ICD-10-CM

## 2022-06-30 PROCEDURE — 76642 ULTRASOUND BREAST LIMITED: CPT

## 2022-07-27 DIAGNOSIS — J30.9 ALLERGIC RHINITIS, UNSPECIFIED SEASONALITY, UNSPECIFIED TRIGGER: Primary | ICD-10-CM

## 2022-07-27 RX ORDER — FLUTICASONE PROPIONATE 50 MCG
1 SPRAY, SUSPENSION (ML) NASAL DAILY
Qty: 11.1 ML | Refills: 2 | Status: SHIPPED | OUTPATIENT
Start: 2022-07-27 | End: 2022-08-19

## 2022-07-27 RX ORDER — FLUTICASONE PROPIONATE 50 MCG
1 SPRAY, SUSPENSION (ML) NASAL DAILY
Qty: 11.1 ML | Refills: 2 | Status: SHIPPED | OUTPATIENT
Start: 2022-07-27 | End: 2022-07-27

## 2022-07-28 ENCOUNTER — TELEPHONE (OUTPATIENT)
Dept: CARDIOLOGY CLINIC | Facility: CLINIC | Age: 67
End: 2022-07-28

## 2022-07-28 NOTE — TELEPHONE ENCOUNTER
Called spoke to pt, pt did not get the [employer] system    Pt is scheduled 7/29 nurse visit for a bio tel placement

## 2022-07-28 NOTE — TELEPHONE ENCOUNTER
Does patient has any device to check his heart rate and rhythm during these episodes  I had discussed with him to get [employer] during last office visit  If he has not been doing that, please schedule him for bio Tel monitor for 1 week  Thank you

## 2022-07-28 NOTE — TELEPHONE ENCOUNTER
Pt called called, admits to having the following symptoms  Tachycardia, chest tightness, sweating, shallow breathing, which occurs mostly in evening  Pt admits episodes last for a few minutes  Pt was scheduled to see apolonia on 8/9/22 @ 3pm   Pt denies any dizziness, edema  Advised pt, if symptoms worsen to go to ER      Please advise

## 2022-07-29 ENCOUNTER — CLINICAL SUPPORT (OUTPATIENT)
Dept: CARDIOLOGY CLINIC | Facility: CLINIC | Age: 67
End: 2022-07-29
Payer: MEDICARE

## 2022-07-29 DIAGNOSIS — I47.1 SVT (SUPRAVENTRICULAR TACHYCARDIA) (HCC): Primary | ICD-10-CM

## 2022-07-29 PROCEDURE — 99211 OFF/OP EST MAY X REQ PHY/QHP: CPT

## 2022-07-29 NOTE — PROGRESS NOTES
Pt was in the office for a nurse visit /biotel  today instructed by Dr Huerta Monday was successfully placed and wear and care was explained to pt  Pt verbally understood

## 2022-08-05 ENCOUNTER — CLINICAL SUPPORT (OUTPATIENT)
Dept: CARDIOLOGY CLINIC | Facility: CLINIC | Age: 67
End: 2022-08-05
Payer: MEDICARE

## 2022-08-05 DIAGNOSIS — I47.1 SVT (SUPRAVENTRICULAR TACHYCARDIA) (HCC): ICD-10-CM

## 2022-08-05 PROCEDURE — 93228 REMOTE 30 DAY ECG REV/REPORT: CPT | Performed by: INTERNAL MEDICINE

## 2022-08-09 ENCOUNTER — OFFICE VISIT (OUTPATIENT)
Dept: CARDIOLOGY CLINIC | Facility: CLINIC | Age: 67
End: 2022-08-09
Payer: MEDICARE

## 2022-08-09 ENCOUNTER — CLINICAL SUPPORT (OUTPATIENT)
Dept: CARDIOLOGY CLINIC | Facility: CLINIC | Age: 67
End: 2022-08-09
Payer: MEDICARE

## 2022-08-09 VITALS
HEIGHT: 69 IN | RESPIRATION RATE: 16 BRPM | OXYGEN SATURATION: 95 % | SYSTOLIC BLOOD PRESSURE: 126 MMHG | DIASTOLIC BLOOD PRESSURE: 76 MMHG | WEIGHT: 226 LBS | BODY MASS INDEX: 33.47 KG/M2 | HEART RATE: 75 BPM

## 2022-08-09 DIAGNOSIS — I48.0 PAROXYSMAL ATRIAL FIBRILLATION (HCC): ICD-10-CM

## 2022-08-09 DIAGNOSIS — I47.1 SUPRAVENTRICULAR TACHYCARDIA (HCC): ICD-10-CM

## 2022-08-09 DIAGNOSIS — I47.1 SVT (SUPRAVENTRICULAR TACHYCARDIA) (HCC): ICD-10-CM

## 2022-08-09 DIAGNOSIS — R00.2 PALPITATION: Primary | ICD-10-CM

## 2022-08-09 PROCEDURE — 93228 REMOTE 30 DAY ECG REV/REPORT: CPT | Performed by: INTERNAL MEDICINE

## 2022-08-09 PROCEDURE — 93000 ELECTROCARDIOGRAM COMPLETE: CPT | Performed by: NURSE PRACTITIONER

## 2022-08-09 PROCEDURE — 99213 OFFICE O/P EST LOW 20 MIN: CPT | Performed by: NURSE PRACTITIONER

## 2022-08-09 RX ORDER — METOPROLOL SUCCINATE 50 MG/1
50 TABLET, EXTENDED RELEASE ORAL DAILY
Qty: 90 TABLET | Refills: 2 | Status: SHIPPED | OUTPATIENT
Start: 2022-08-09

## 2022-08-09 RX ORDER — METOPROLOL SUCCINATE 50 MG/1
50 TABLET, EXTENDED RELEASE ORAL DAILY
Qty: 90 TABLET | Refills: 2 | Status: SHIPPED | OUTPATIENT
Start: 2022-08-09 | End: 2022-08-09 | Stop reason: SDUPTHER

## 2022-08-09 NOTE — PROGRESS NOTES
Cardiology Office Note    Meli Burks 74 y o  male MRN: [id number]    08/09/22          Assessment/Plan:    1  New onset atrial fibrillation/atrial flutter  -noted on cardiac event monitoring  -patient with a CHADS2 Vasc score of only 1, continue aspirin  -increase metoprolol succinate to 50 mg daily in frequency of atrial fibrillation/flutter on cardiac event monitoring  -patient referred to electrophysiology for possible ablation  -patient instructed to monitor his heart rates at home    2  Hyperlipidemia  -continue atorvastatin    Follow up: 3 months or sooner as needed    1  Palpitation  POCT ECG    metoprolol succinate (TOPROL-XL) 50 mg 24 hr tablet    DISCONTINUED: metoprolol succinate (TOPROL-XL) 50 mg 24 hr tablet   2  Paroxysmal atrial fibrillation (HCC)  POCT ECG    Echo complete w/ contrast if indicated    TSH, 3rd generation with Free T4 reflex    NM myocardial perfusion spect (stress and/or rest)   3  SVT (supraventricular tachycardia) (HCC)  metoprolol succinate (TOPROL-XL) 50 mg 24 hr tablet    DISCONTINUED: metoprolol succinate (TOPROL-XL) 50 mg 24 hr tablet       HPI: Meli Burks is a 74y o  year old male past medical history of SVT, palpitations, hyperlipidemia and GERD who presents today for routine office visit and review of cardiac event monitoring  He t was seen in April of 2022 and at that time stated was doing very well from cardiac standpoint and his palpitations were better  He recently completed cardiac event monitoring which showed multiple episodes of atrial fibrillation/flutter with a 9% burden  The pathophysiology of atrial fibrillation/flutter was discussed today at length with him and his wife  He has a chads 2 Vasc score of only 1, therefore he can continue taking aspirin  It was also discussed the need to increase his metoprolol succinate  We will also check a TSH and set him up to have an echo and stress test performed to rule out ischemic etiology    He also will be referred to electrophysiology for possible ablation  He and his wife both understood the plan of care and agreed to it  He states that he does get symptomatic when he is in atrial fibrillation as he can feel his heart going fast and he will get slightly diaphoretic  Currently he denies chest pain, dyspnea on exertion or rest, lower extremity edema, and was instructed to call  the office or seek medical attention if any such symptoms develop  All medications reviewed and patient is tolerating medications without side effects         EKG- sinus rhythm with bifascicular block    Patient Active Problem List   Diagnosis    Gastroesophageal reflux disease without esophagitis    Hyperlipidemia    Diabetes mellitus screening    Prostate cancer screening    Dyspepsia    Serum total bilirubin elevated    Right hip pain    Impaired fasting glucose    Memory loss    Anxiety    Behavioral change    Memory difficulty    Palpitation    PAC (premature atrial contraction)    COVID-19 virus infection    Tinea cruris    Medicare annual wellness visit, initial    Axillary tenderness, left       No Known Allergies      Current Outpatient Medications:     ALPRAZolam (XANAX) 0 25 mg tablet, Take 1 tablet (0 25 mg total) by mouth daily as needed for anxiety, Disp: 30 tablet, Rfl: 2    ASPIRIN LOW DOSE 81 MG EC tablet, TAKE 1 TABLET DAILY, Disp: 90 tablet, Rfl: 3    atorvastatin (LIPITOR) 40 mg tablet, TAKE 1 TABLET DAILY, Disp: 90 tablet, Rfl: 3    fluticasone (FLONASE) 50 mcg/act nasal spray, 1 spray into each nostril daily (Patient taking differently: 1 spray into each nostril daily As needed), Disp: 11 1 mL, Rfl: 2    metoprolol succinate (TOPROL-XL) 50 mg 24 hr tablet, Take 1 tablet (50 mg total) by mouth daily, Disp: 90 tablet, Rfl: 2    pantoprazole (PROTONIX) 40 mg tablet, TAKE 1 TABLET TWICE A DAY, Disp: 180 tablet, Rfl: 3    Past Medical History:   Diagnosis Date    Abnormal ECG 5/2021    Had fluttering of my heart    Basal cell carcinoma     GERD (gastroesophageal reflux disease)     Hyperlipidemia     Seasonal allergies     Squamous cell skin cancer 2021    Left neck SCCIS       Family History   Problem Relation Age of Onset    Colon cancer Mother     Other Mother         septicemia    Hypertension Father     Heart attack Father         NSTEMI    Hypertension Sister     Squamous cell carcinoma Sister     Hypertension Brother         Has defibulator and pace maker    Cancer Brother     Basal cell carcinoma Brother        Past Surgical History:   Procedure Laterality Date    CHOLECYSTECTOMY      COLONOSCOPY N/A 10/4/2017    Procedure: COLONOSCOPY;  Surgeon: Dary Mike MD;  Location: [facility]; Service: Gastroenterology    EAR SURGERY      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)    MOHS SURGERY  2021    left neck     MS ESOPHAGOGASTRODUODENOSCOPY TRANSORAL DIAGNOSTIC N/A 2018    Procedure: ESOPHAGOGASTRODUODENOSCOPY (EGD); Surgeon: Dary Mike MD;  Location: [facility]; Service: Gastroenterology    SINUS SURGERY      SKIN BIOPSY         Social History     Socioeconomic History    Marital status: /Civil Union     Spouse name: Not on file    Number of children: Not on file    Years of education: Not on file    Highest education level: Not on file   Occupational History    Not on file   Tobacco Use    Smoking status: Former Smoker     Packs/day: 1 00     Years: 3 00     Pack years: 3 00     Types: Cigarettes     Start date: 1970     Quit date:      Years since quittin 6    Smokeless tobacco: Never Used   Vaping Use    Vaping Use: Never used   Substance and Sexual Activity    Alcohol use:  Yes     Alcohol/week: 3 0 standard drinks     Types: 3 Glasses of wine per week    Drug use: No    Sexual activity: Yes     Partners: Female     Birth control/protection: Female Sterilization   Other Topics Concern    Not on file   Social History Narrative    Not on file     Social Determinants of Health     Financial Resource Strain: Not on file   Food Insecurity: Not on file   Transportation Needs: Not on file   Physical Activity: Not on file   Stress: Not on file   Social Connections: Not on file   Intimate Partner Violence: Not on file   Housing Stability: Not on file       Review of symptoms:   Constitution:  Negative  HEENT:  Negative  Cardiovascular:  Negative  Respiratory:  Negative  Skin:  Negative  Gastrointestinal:  Negative  Genitourinary:  Negative  Musculoskeletal:  Negative  Neurological:  Negative  Endocrine:  Negative  Psychological:  Negative    Vitals: /76 (BP Location: Left arm, Patient Position: Sitting)   Pulse 75   Resp 16   Ht 5' 9" (1 753 m)   Wt 103 kg (226 lb)   SpO2 95%   BMI 33 37 kg/m²         Physical Exam:     GEN: Alert and oriented x 3, in no acute distress  Well appearing and well nourished  HEENT: Sclera anicteric, conjunctivae pink, mucous membranes moist    NECK: Supple, no carotid bruits, no significant JVD  Trachea midline  HEART: Regular rhythm, normal S1 and S2, no murmurs, clicks, gallops or rubs  LUNGS: Clear to auscultation bilaterally; no wheezes, rales, or rhonchi  No increased work of breathing or signs of respiratory distress  ABDOMEN: Soft, nontender, nondistended, normoactive bowel sounds  EXTREMITIES: Skin warm and well perfused, no clubbing, cyanosis, or edema  NEURO: No focal findings  Normal speech  Mood and affect normal    SKIN: Normal without suspicious lesions on exposed skin

## 2022-08-09 NOTE — PATIENT INSTRUCTIONS
See electrophysiology doctor    Get stress test and echo done    Check TSH    Increase metoprolol to 50 mg daily , continue aspirin     Follow up in 3 months

## 2022-08-19 DIAGNOSIS — J30.9 ALLERGIC RHINITIS, UNSPECIFIED SEASONALITY, UNSPECIFIED TRIGGER: ICD-10-CM

## 2022-08-19 RX ORDER — FLUTICASONE PROPIONATE 50 MCG
1 SPRAY, SUSPENSION (ML) NASAL DAILY
Qty: 16 ML | Refills: 3 | Status: SHIPPED | OUTPATIENT
Start: 2022-08-19 | End: 2022-09-14

## 2022-09-01 ENCOUNTER — HOSPITAL ENCOUNTER (OUTPATIENT)
Dept: NON INVASIVE DIAGNOSTICS | Facility: CLINIC | Age: 67
Discharge: HOME | End: 2022-09-01
Payer: MEDICARE

## 2022-09-01 VITALS
WEIGHT: 226 LBS | BODY MASS INDEX: 33.47 KG/M2 | HEIGHT: 69 IN | SYSTOLIC BLOOD PRESSURE: 160 MMHG | DIASTOLIC BLOOD PRESSURE: 80 MMHG | OXYGEN SATURATION: 98 % | HEART RATE: 88 BPM

## 2022-09-01 VITALS
BODY MASS INDEX: 33.47 KG/M2 | WEIGHT: 226 LBS | DIASTOLIC BLOOD PRESSURE: 76 MMHG | HEIGHT: 69 IN | HEART RATE: 75 BPM | SYSTOLIC BLOOD PRESSURE: 126 MMHG

## 2022-09-01 DIAGNOSIS — I48.0 PAROXYSMAL ATRIAL FIBRILLATION (HCC): ICD-10-CM

## 2022-09-01 LAB
AORTIC ROOT: 3.2 CM
APICAL FOUR CHAMBER EJECTION FRACTION: 69 %
ASCENDING AORTA: 3.1 CM
AV LVOT PEAK GRADIENT: 4 MMHG
AV PEAK GRADIENT: 9 MMHG
E WAVE DECELERATION TIME: 250 MS
FRACTIONAL SHORTENING: 35 % (ref 28–44)
INTERVENTRICULAR SEPTUM IN DIASTOLE (PARASTERNAL SHORT AXIS VIEW): 1.3 CM
INTERVENTRICULAR SEPTUM: 1.3 CM (ref 0.6–1.1)
LAAS-AP2: 13.2 CM2
LAAS-AP4: 13.1 CM2
LEFT ATRIUM AREA SYSTOLE SINGLE PLANE A4C: 13.5 CM2
LEFT ATRIUM SIZE: 4.4 CM
LEFT INTERNAL DIMENSION IN SYSTOLE: 2.4 CM (ref 2.1–4)
LEFT VENTRICULAR INTERNAL DIMENSION IN DIASTOLE: 3.7 CM (ref 3.5–6)
LEFT VENTRICULAR POSTERIOR WALL IN END DIASTOLE: 1.1 CM
LEFT VENTRICULAR STROKE VOLUME: 37 ML
LVSV (TEICH): 37 ML
MV E'TISSUE VEL-SEP: 7 CM/S
MV PEAK A VEL: 1.06 M/S
MV PEAK E VEL: 66 CM/S
MV STENOSIS PRESSURE HALF TIME: 73 MS
MV VALVE AREA P 1/2 METHOD: 3.01 CM2
NUC STRESS DIASTOLIC VOLUME INDEX: 58 ML/M2
NUC STRESS EJECTION FRACTION: 73 %
NUC STRESS SYSTOLIC VOLUME INDEX: 16 ML/M2
RATE PRESSURE PRODUCT: NORMAL
RIGHT ATRIAL 2D VOLUME: 28 ML
RIGHT ATRIUM AREA SYSTOLE A4C: 11.4 CM2
RIGHT VENTRICLE ID DIMENSION: 3.5 CM
SL CV LEFT ATRIUM LENGTH A2C: 5 CM
SL CV PED ECHO LEFT VENTRICLE DIASTOLIC VOLUME (MOD BIPLANE) 2D: 58 ML
SL CV PED ECHO LEFT VENTRICLE SYSTOLIC VOLUME (MOD BIPLANE) 2D: 21 ML
SL CV REST NUCLEAR ISOTOPE DOSE: 10.92 MCI
SL CV STRESS NUCLEAR ISOTOPE DOSE: 32 MCI
SL CV STRESS RECOVERY BP: NORMAL MMHG
SL CV STRESS RECOVERY HR: 93 BPM
SL CV STRESS RECOVERY O2 SAT: 99 %
STRESS ANGINA INDEX: 0
STRESS BASELINE BP: NORMAL MMHG
STRESS BASELINE HR: 88 BPM
STRESS O2 SAT REST: 96 %
STRESS PEAK HR: 146 BPM
STRESS POST O2 SAT PEAK: 98 %
STRESS POST PEAK BP: 142 MMHG
STRESS ST DEPRESSION: 0 MM
STRESS/REST PERFUSION RATIO: 0.79
TR MAX PG: 19 MMHG
TR PEAK VELOCITY: 2.2 M/S
TRICUSPID VALVE PEAK REGURGITATION VELOCITY: 2.19 M/S

## 2022-09-01 PROCEDURE — 93017 CV STRESS TEST TRACING ONLY: CPT

## 2022-09-01 PROCEDURE — 93018 CV STRESS TEST I&R ONLY: CPT | Performed by: INTERNAL MEDICINE

## 2022-09-01 PROCEDURE — 78452 HT MUSCLE IMAGE SPECT MULT: CPT

## 2022-09-01 PROCEDURE — 93306 TTE W/DOPPLER COMPLETE: CPT

## 2022-09-01 PROCEDURE — 93016 CV STRESS TEST SUPVJ ONLY: CPT | Performed by: INTERNAL MEDICINE

## 2022-09-01 PROCEDURE — A9502 TC99M TETROFOSMIN: HCPCS

## 2022-09-01 PROCEDURE — 93306 TTE W/DOPPLER COMPLETE: CPT | Performed by: INTERNAL MEDICINE

## 2022-09-01 PROCEDURE — G1004 CDSM NDSC: HCPCS

## 2022-09-01 PROCEDURE — 78452 HT MUSCLE IMAGE SPECT MULT: CPT | Performed by: INTERNAL MEDICINE

## 2022-09-01 RX ORDER — AMINOPHYLLINE DIHYDRATE 25 MG/ML
50 INJECTION, SOLUTION INTRAVENOUS ONCE
Status: COMPLETED | OUTPATIENT
Start: 2022-09-01 | End: 2022-09-01

## 2022-09-01 RX ADMIN — AMINOPHYLLINE 50 MG: 25 INJECTION, SOLUTION INTRAVENOUS at 13:16

## 2022-09-01 RX ADMIN — REGADENOSON 0.4 MG: 0.08 INJECTION, SOLUTION INTRAVENOUS at 13:13

## 2022-09-02 LAB
CHEST PAIN STATEMENT: NORMAL
MAX DIASTOLIC BP: 80 MMHG
MAX HEART RATE: 150 BPM
MAX PREDICTED HEART RATE: 155 BPM
MAX. SYSTOLIC BP: 220 MMHG
PROTOCOL NAME: NORMAL
TARGET HR FORMULA: NORMAL
TIME IN EXERCISE PHASE: NORMAL

## 2022-09-13 ENCOUNTER — CONSULT (OUTPATIENT)
Dept: CARDIOLOGY CLINIC | Facility: CLINIC | Age: 67
End: 2022-09-13
Payer: MEDICARE

## 2022-09-13 VITALS
SYSTOLIC BLOOD PRESSURE: 132 MMHG | DIASTOLIC BLOOD PRESSURE: 68 MMHG | WEIGHT: 229.1 LBS | HEART RATE: 62 BPM | BODY MASS INDEX: 33.93 KG/M2 | HEIGHT: 69 IN

## 2022-09-13 DIAGNOSIS — I48.3 TYPICAL ATRIAL FLUTTER (HCC): ICD-10-CM

## 2022-09-13 DIAGNOSIS — I48.0 PAROXYSMAL ATRIAL FIBRILLATION (HCC): ICD-10-CM

## 2022-09-13 DIAGNOSIS — I47.1 SUPRAVENTRICULAR TACHYCARDIA (HCC): Primary | ICD-10-CM

## 2022-09-13 PROBLEM — I48.91 ATRIAL FIBRILLATION (HCC): Status: ACTIVE | Noted: 2022-09-13

## 2022-09-13 PROBLEM — I48.92 ATRIAL FLUTTER (HCC): Status: ACTIVE | Noted: 2022-09-13

## 2022-09-13 PROCEDURE — 99205 OFFICE O/P NEW HI 60 MIN: CPT | Performed by: INTERNAL MEDICINE

## 2022-09-13 PROCEDURE — 93000 ELECTROCARDIOGRAM COMPLETE: CPT | Performed by: INTERNAL MEDICINE

## 2022-09-13 NOTE — PROGRESS NOTES
EPS Consultation/New Patient Evaluation - Geraldo Herrera 74 y o  male MRN: [id number]           ASSESSMENT:  1  Supraventricular tachycardia (HCC)  POCT ECG   2  Typical atrial flutter (HCC)  AMB extended holter monitor   3  Paroxysmal atrial fibrillation (HCC)  AMB extended holter monitor           PLAN:  I explained to the patient that they would be a candidate for an atrial fibrillation and atrial flutter ablation  I explained that the success rate is approximately 70-80% with the understanding that some patients will require a 2nd procedure  I explained in detail how the procedure is performed  Risks were explained to the patient including but not limited to bleeding, damage to blood vessels heart valves and other organs, small risk of serious complication including cerebral vascular accident, heart perforation, myocardial infarction, atrial esophageal fistula, phrenic nerve damage  I also explained that the majority of patients have transient chest discomfort postoperatively secondary to heart inflammation and this often lasts up to one week  Finally I explained that patients may experience atrial fibrillation in the 1st three months postoperatively and this does not mean the procedure was unsuccessful as transient atrial arrhythmias may occur secondary to inflammation and healing post ablation  We also discussed anticoagulation and he understands there is a small risk of CVA while taking aspirin but I did explain to him the guidelines indicate that he could take nothing aspirin or anticoagulation  He feels much better for the last month since his metoprolol has been increased and is not interested in moving forward with an invasive procedure at this point time which is reasonable given that he feels well  I did recommend that he undergo a two week monitor to see how much silent atrial fibrillation he is having and atrial flutter    Interestingly the majority of his rhythms were atrial flutter but there was one strip of AFib  All questions were answered he will follow up with his regular cardiologist Dr Luna Record and follow-up with me on an as-needed basis monitor was placed today    He does have a right bundle-branch block and this is asymptomatic        CC/HPI:   Presents for consultation for atrial flutter  Started noticing a few months ago  Symptoms of heart racing, diaphoresis and HA  Episodes last a few minutes at a time  He does not notice it all the time  Happens 5-6 x a day when cognizant of it  Was on 25 mg of metoprolol  Recently metoprolol increased to 50 mg daily and he does not feel them as much  Family history: father and sister hypertension  Brother has ICD      ROS    He feels great except when he has his episodes of atrial fibrillation and atrial flutter in which he has heart racing diaphoresis and headache he does not have any chest pain with exertion or shortness of breath with exertion all other 12 point review of systems negative    Objective:     Vitals: Blood pressure 132/68, pulse 62, height 5' 9" (1 753 m), weight 104 kg (229 lb 1 6 oz)  , Body mass index is 33 83 kg/m²  , ?        Physical Exam:    GEN: Radha Escobar appears well, alert and oriented x 3, pleasant and cooperative   HEENT: pupils equal, round, and reactive to light; extraocular muscles intact  NECK: supple, no carotid bruits   HEART: regular rhythm, normal S1 and S2, no murmurs, clicks, gallops or rubs   LUNGS: clear to auscultation bilaterally; no wheezes, rales, or rhonchi   ABDOMEN: normal bowel sounds, soft, no tenderness, no distention  EXTREMITIES: peripheral pulses normal; no clubbing, cyanosis, or edema  NEURO: no focal findings   SKIN: normal without suspicious lesions on exposed skin    Medications:      Current Outpatient Medications:     ALPRAZolam (XANAX) 0 25 mg tablet, Take 1 tablet (0 25 mg total) by mouth daily as needed for anxiety, Disp: 30 tablet, Rfl: 2    ASPIRIN LOW DOSE 81 MG EC tablet, TAKE 1 TABLET DAILY, Disp: 90 tablet, Rfl: 3    atorvastatin (LIPITOR) 40 mg tablet, TAKE 1 TABLET DAILY, Disp: 90 tablet, Rfl: 3    fluticasone (FLONASE) 50 mcg/act nasal spray, 1 spray into each nostril daily As needed, Disp: 16 mL, Rfl: 3    metoprolol succinate (TOPROL-XL) 50 mg 24 hr tablet, Take 1 tablet (50 mg total) by mouth daily, Disp: 90 tablet, Rfl: 2    pantoprazole (PROTONIX) 40 mg tablet, TAKE 1 TABLET TWICE A DAY, Disp: 180 tablet, Rfl: 3     Family History   Problem Relation Age of Onset    Colon cancer Mother     Other Mother         septicemia    Hypertension Father     Heart attack Father         NSTEMI    Hypertension Sister     Squamous cell carcinoma Sister     Hypertension Brother         Has defibulator and pace maker    Cancer Brother     Basal cell carcinoma Brother      Social History     Socioeconomic History    Marital status: /Civil Union     Spouse name: Not on file    Number of children: Not on file    Years of education: Not on file    Highest education level: Not on file   Occupational History    Not on file   Tobacco Use    Smoking status: Former Smoker     Packs/day: 1 00     Years: 3 00     Pack years: 3 00     Types: Cigarettes     Start date: 1970     Quit date:      Years since quittin 7    Smokeless tobacco: Never Used   Vaping Use    Vaping Use: Never used   Substance and Sexual Activity    Alcohol use: Yes     Alcohol/week: 3 0 standard drinks     Types: 3 Glasses of wine per week     Comment:  Occ wine    Drug use: No    Sexual activity: Yes     Partners: Female     Birth control/protection: Female Sterilization   Other Topics Concern    Not on file   Social History Narrative    Not on file     Social Determinants of Health     Financial Resource Strain: Not on file   Food Insecurity: Not on file   Transportation Needs: Not on file   Physical Activity: Not on file   Stress: Not on file   Social Connections: Not on file Intimate Partner Violence: Not on file   Housing Stability: Not on file     Social History     Tobacco Use   Smoking Status Former Smoker    Packs/day: 1 00    Years: 3 00    Pack years: 3 00    Types: Cigarettes    Start date: 1970   Kennedy Cooper Quit date: 80    Years since quittin 7   Smokeless Tobacco Never Used     Social History     Substance and Sexual Activity   Alcohol Use Yes    Alcohol/week: 3 0 standard drinks    Types: 3 Glasses of wine per week    Comment: Occ wine       Labs & Results:  Below is the patient's most recent value for Albumin, ALT, AST, BUN, Calcium, Chloride, Cholesterol, CO2, Creatinine, GFR, Glucose, HDL, Hematocrit, Hemoglobin, Hemoglobin A1C, LDL, Magnesium, Phosphorus, Platelets, Potassium, PSA, Sodium, Triglycerides, and WBC  Lab Results   Component Value Date    ALT 30 2022    AST 20 2022    BUN 14 2022    CALCIUM 9 6 2016     2022    CHOL 184 2016    CO2 23 2022    CREATININE 1 12 2022    HDL 40 2022    HCT 51 2 (H) 2021    HGB 17 2 2021    HGBA1C 6 1 (H) 2022    MG 2 1 2016     2021    K 4 5 2022    PSA 2 5 2022     2016    TRIG 109 2022    WBC 9 6 2021     Note: for a comprehensive list of the patient's lab results, access the Results Review activity              Cardiac testing:   Results for orders placed during the hospital encounter of 21    Echo complete with contrast if indicated    [address]    Transthoracic Echocardiogram  2D, M-mode, Doppler, and Color Doppler    Study date:  13-May-2021    Patient: Alisha Alvarez  MR number: [id number]  Account number: [de-identified]  : 1955  Age: 61 years  Gender: Male  Status: Outpatient  Location: [facility]  Height: 70 in  Weight: 225 lb  BP: 122/ 68 mmHg    Indications: Atrial Fibrillation    Sonographer:  Lenora Bass RDCS  Referring Physician:  Charmayne Mylar, MD  [facility]  Interpreting Physician: SANDRA Dumont    LEFT VENTRICLE:  Systolic function was normal  Ejection fraction was estimated to be 65 %  There were no regional wall motion abnormalities  HISTORY: PRIOR HISTORY: PAC's, Anxiety, Hyperlipidemia    PROCEDURE: The study was performed in the [facility] and Vascular Center  This was a routine study  The transthoracic approach was used  The study included complete 2D imaging, M-mode, complete spectral Doppler, and color Doppler  The  heart rate was 78 bpm, at the start of the study  Images were obtained from the parasternal, apical, subcostal, and suprasternal notch acoustic windows  Image quality was adequate  LEFT VENTRICLE: Size was normal  Systolic function was normal  Ejection fraction was estimated to be 65 %  There were no regional wall motion abnormalities  Wall thickness was normal  No evidence of apical thrombus  DOPPLER: Left  ventricular diastolic function parameters were normal     RIGHT VENTRICLE: The size was normal  Systolic function was normal  Wall thickness was normal     LEFT ATRIUM: Size was normal     RIGHT ATRIUM: Size was normal     MITRAL VALVE: Valve structure was normal  There was normal leaflet separation  DOPPLER: The transmitral velocity was within the normal range  There was no evidence for stenosis  There was no significant regurgitation  AORTIC VALVE: The valve was trileaflet  Leaflets exhibited normal thickness and normal cuspal separation  DOPPLER: Transaortic velocity was within the normal range  There was no evidence for stenosis  There was no significant  regurgitation  TRICUSPID VALVE: The valve structure was normal  There was normal leaflet separation  DOPPLER: The transtricuspid velocity was within the normal range  There was no evidence for stenosis   There was no significant regurgitation  PULMONIC VALVE: Leaflets exhibited normal thickness, no calcification, and normal cuspal separation  DOPPLER: The transpulmonic velocity was within the normal range  There was no significant regurgitation  PERICARDIUM: There was no pericardial effusion  The pericardium was normal in appearance  AORTA: The root exhibited normal size  SYSTEMIC VEINS: IVC: The inferior vena cava was normal in size  SYSTEM MEASUREMENT TABLES    2D  %FS: 43 29 %  Ao Diam: 3 11 cm  EDV(Teich): 89 74 ml  EF(Teich): 74 63 %  ESV(Teich): 22 77 ml  IVSd: 0 7 cm  LA Area: 20 42 cm2  LA Diam: 3 75 cm  LVEDV MOD A4C: 141 44 ml  LVEF MOD A4C: 70 78 %  LVESV MOD A4C: 41 32 ml  LVIDd: 4 44 cm  LVIDs: 2 52 cm  LVLd A4C: 8 72 cm  LVLs A4C: 7 29 cm  LVPWd: 0 81 cm  RA Area: 13 7 cm2  RVIDd: 3 54 cm  SV MOD A4C: 100 12 ml  SV(Teich): 66 97 ml    MM  TAPSE: 1 76 cm    PW  E' Sept: 0 09 m/s  E/E' Sept: 8 61  LVOT Env  Ti: 263 37 ms  LVOT VTI: 16 22 cm  LVOT Vmax: 0 87 m/s  LVOT Vmean: 0 62 m/s  LVOT maxPG: 3 11 mmHg  LVOT meanP 7 mmHg  MV A La[place]: 0 71 m/s  MV Dec [place]: 3 23 m/s2  MV DecT: 229 34 ms  MV E Lavon: 0 74 m/s  MV E/A Ratio: 1 05  MV PHT: 66 51 ms  MVA By PHT: 3 31 cm2  RVOT Env  Ti: 252 14 ms  RVOT VTI: 15 03 cm  RVOT Vmax: 0 91 m/s  RVOT Vmean: 0 61 m/s  RVOT maxPG: 3 32 mmHg  RVOT meanP 75 mmHg    [facility]    Prepared and electronically signed by    SANDRA Newell  Signed 13-May-2021 14:34:01    No results found for this or any previous visit  No results found for this or any previous visit  No results found for this or any previous visit

## 2022-09-14 DIAGNOSIS — J30.9 ALLERGIC RHINITIS, UNSPECIFIED SEASONALITY, UNSPECIFIED TRIGGER: ICD-10-CM

## 2022-09-14 RX ORDER — FLUTICASONE PROPIONATE 50 MCG
SPRAY, SUSPENSION (ML) NASAL
Qty: 48 ML | Refills: 2 | Status: SHIPPED | OUTPATIENT
Start: 2022-09-14

## 2022-09-19 ENCOUNTER — TELEPHONE (OUTPATIENT)
Dept: GASTROENTEROLOGY | Facility: CLINIC | Age: 67
End: 2022-09-19

## 2022-09-30 ENCOUNTER — HOSPITAL ENCOUNTER (OUTPATIENT)
Dept: ULTRASOUND IMAGING | Facility: CLINIC | Age: 67
End: 2022-09-30
Payer: MEDICARE

## 2022-09-30 DIAGNOSIS — R92.8 ABNORMAL MAMMOGRAM: ICD-10-CM

## 2022-09-30 PROCEDURE — 76642 ULTRASOUND BREAST LIMITED: CPT

## 2022-10-04 ENCOUNTER — CLINICAL SUPPORT (OUTPATIENT)
Dept: CARDIOLOGY CLINIC | Facility: CLINIC | Age: 67
End: 2022-10-04
Payer: MEDICARE

## 2022-10-04 DIAGNOSIS — I48.0 PAROXYSMAL ATRIAL FIBRILLATION (HCC): ICD-10-CM

## 2022-10-04 DIAGNOSIS — I48.3 TYPICAL ATRIAL FLUTTER (HCC): ICD-10-CM

## 2022-10-04 PROCEDURE — 93248 EXT ECG>7D<15D REV&INTERPJ: CPT | Performed by: INTERNAL MEDICINE

## 2022-10-11 PROBLEM — Z00.00 MEDICARE ANNUAL WELLNESS VISIT, INITIAL: Status: RESOLVED | Noted: 2022-04-25 | Resolved: 2022-10-11

## 2022-10-20 ENCOUNTER — OFFICE VISIT (OUTPATIENT)
Dept: CARDIOLOGY CLINIC | Facility: CLINIC | Age: 67
End: 2022-10-20
Payer: MEDICARE

## 2022-10-20 VITALS
HEART RATE: 57 BPM | RESPIRATION RATE: 16 BRPM | SYSTOLIC BLOOD PRESSURE: 124 MMHG | DIASTOLIC BLOOD PRESSURE: 68 MMHG | WEIGHT: 226 LBS | BODY MASS INDEX: 33.47 KG/M2 | HEIGHT: 69 IN | OXYGEN SATURATION: 94 %

## 2022-10-20 DIAGNOSIS — I48.0 PAF (PAROXYSMAL ATRIAL FIBRILLATION) (HCC): ICD-10-CM

## 2022-10-20 DIAGNOSIS — I47.1 SVT (SUPRAVENTRICULAR TACHYCARDIA) (HCC): Primary | ICD-10-CM

## 2022-10-20 DIAGNOSIS — I48.92 ATRIAL FLUTTER, UNSPECIFIED TYPE (HCC): ICD-10-CM

## 2022-10-20 PROCEDURE — 99214 OFFICE O/P EST MOD 30 MIN: CPT | Performed by: INTERNAL MEDICINE

## 2022-10-20 NOTE — PROGRESS NOTES
[facility] [address] [facility] [place] [address]  Cardiology Follow Up    Sumi Jones  1955  [id number]      1  SVT (supraventricular tachycardia) ([facility] [address] )     2  PAF (paroxysmal atrial fibrillation) ([facility] [address] )     3  Atrial flutter, unspecified type [facility])         Chief Complaint   Patient presents with   • Follow-up       Interval History:  Patient presents for follow-up visit  Patient was evaluated by electrophysiology Dr Tejeda  Patient had 2 week monitor which showed 3% burden of AFib  Patient also had atrial flutter as well as SVT/PAT  Patient feels much better on the higher dose of metoprolol  No chest pain  No shortness of breath  No history of leg edema orthopnea PND  He states that he has been compliant all his present medications      Patient Active Problem List   Diagnosis   • Gastroesophageal reflux disease without esophagitis   • Hyperlipidemia   • Diabetes mellitus screening   • Prostate cancer screening   • Dyspepsia   • Serum total bilirubin elevated   • Right hip pain   • Impaired fasting glucose   • Memory loss   • Anxiety   • Behavioral change   • Memory difficulty   • Palpitation   • PAC (premature atrial contraction)   • COVID-19 virus infection   • Tinea cruris   • Axillary tenderness, left   • Atrial fibrillation (HCC)   • Atrial flutter (HCC)     Past Medical History:   Diagnosis Date   • Abnormal ECG 5/2021    Had fluttering of my heart   • Basal cell carcinoma    • GERD (gastroesophageal reflux disease)    • Hyperlipidemia    • Seasonal allergies    • Squamous cell skin cancer 04/26/2021    Left neck SCCIS     Social History     Socioeconomic History   • Marital status: /Civil Union     Spouse name: Not on file   • Number of children: Not on file   • Years of education: Not on file   • Highest education level: Not on file   Occupational History   • Not on file   Tobacco Use   • Smoking status: Former Smoker     Packs/day: 1 00     Years: 3 00 Pack years: 3 00     Types: Cigarettes     Start date: 1970     Quit date:      Years since quittin 8   • Smokeless tobacco: Never Used   Vaping Use   • Vaping Use: Never used   Substance and Sexual Activity   • Alcohol use: Yes     Alcohol/week: 3 0 standard drinks     Types: 3 Glasses of wine per week     Comment: Occ wine   • Drug use: No   • Sexual activity: Yes     Partners: Female     Birth control/protection: Female Sterilization   Other Topics Concern   • Not on file   Social History Narrative   • Not on file     Social Determinants of Health     Financial Resource Strain: Not on file   Food Insecurity: Not on file   Transportation Needs: Not on file   Physical Activity: Not on file   Stress: Not on file   Social Connections: Not on file   Intimate Partner Violence: Not on file   Housing Stability: Not on file      Family History   Problem Relation Age of Onset   • Colon cancer Mother    • Other Mother         septicemia   • Hypertension Father    • Heart attack Father         NSTEMI   • Hypertension Sister    • Squamous cell carcinoma Sister    • Hypertension Brother         Has defibulator and pace maker   • Cancer Brother    • Basal cell carcinoma Brother      Past Surgical History:   Procedure Laterality Date   • CHOLECYSTECTOMY     • COLONOSCOPY N/A 10/4/2017    Procedure: COLONOSCOPY;  Surgeon: Lamonte Cornejo MD;  Location: [facility]; Service: Gastroenterology   • EAR SURGERY      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)   • MOHS SURGERY  2021    left neck    • LA ESOPHAGOGASTRODUODENOSCOPY TRANSORAL DIAGNOSTIC N/A 2018    Procedure: ESOPHAGOGASTRODUODENOSCOPY (EGD); Surgeon: Lamonte Cornejo MD;  Location: [facility];   Service: Gastroenterology   • SINUS SURGERY     • SKIN BIOPSY         Current Outpatient Medications:   •  ALPRAZolam (XANAX) 0 25 mg tablet, Take 1 tablet (0 25 mg total) by mouth daily as needed for anxiety, Disp: 30 tablet, Rfl: 2  •  ASPIRIN LOW DOSE 81 MG EC tablet, TAKE 1 TABLET DAILY, Disp: 90 tablet, Rfl: 3  •  atorvastatin (LIPITOR) 40 mg tablet, TAKE 1 TABLET DAILY, Disp: 90 tablet, Rfl: 3  •  fluticasone (FLONASE) 50 mcg/act nasal spray, SQUIRT 1 SPRAY INTO EACH NOSTRIL DAILY AS NEEDED, Disp: 48 mL, Rfl: 2  •  metoprolol succinate (TOPROL-XL) 50 mg 24 hr tablet, Take 1 tablet (50 mg total) by mouth daily, Disp: 90 tablet, Rfl: 2  •  pantoprazole (PROTONIX) 40 mg tablet, TAKE 1 TABLET TWICE A DAY, Disp: 180 tablet, Rfl: 3  No Known Allergies    Labs:  Hospital Outpatient Visit on 09/01/2022   Component Date Value   • Protocol Name 09/01/2022 TOPHER    • Time In Exercise Phase 09/01/2022 00:09:56    • MAX   SYSTOLIC BP 60/28/7639 627    • Max Diastolic Bp 60/29/9740 80    • Max Heart Rate 09/01/2022 150    • Max Predicted Heart Rate 09/01/2022 155    • Reason for Termination 09/01/2022                      Value:Target Heart Rate Achieved  Protocol Complete     • Test Indication 09/01/2022                      Value:Atrial Fibrillation  Atrial Flutter     • Target Hr Formular 09/01/2022 (220 - Age)*85%    • Chest Pain Statement 09/01/2022 none    • Rest Nuclear Isotope Dose 09/01/2022 10 92    • Stress Nuclear Isotope D* 09/01/2022 32 00    • Stress/rest perfusion ra* 09/01/2022 5 16    • End diastolic index (mL/* 00/59/2401 58 0    • EF (%) 09/01/2022 73    • End systolic index (mL/m* 87/90/1723 16 0    • Baseline HR 09/01/2022 88    • Baseline BP 09/01/2022 160/80    • O2 sat rest 09/01/2022 96    • Stress peak HR 09/01/2022 146    • Post peak BP 09/01/2022 142    • Rate Pressure Product 09/01/2022 20,732 0    • O2 sat peak 09/01/2022 98    • Recovery HR 09/01/2022 93    • Recovery BP 09/01/2022 132/60    • O2 sat recovery 09/01/2022 99    • Angina Index 09/01/2022 0    • ST Depression (mm) 09/01/2022 0    Hospital Outpatient Visit on 09/01/2022   Component Date Value   • LA size 09/01/2022 4 4    • Triscuspid Valve Regurgi* 09/01/2022 19 0    • Tricuspid valve peak reg* 09/01/2022 2 19    • LVPWd 09/01/2022 1 10    • Left Atrium Area-systoli* 09/01/2022 13 2    • Left Atrium Area-systoli* 09/01/2022 13 1    • MV E' Tissue Velocity Se* 09/01/2022 7    • RA 2D Volume 09/01/2022 28 0    • TR Peak Lavon 09/01/2022 2 2    • IVSd 09/01/2022 5 63    • LV DIASTOLIC VOLUME (MOD* 74/33/8691 58    • LEFT VENTRICLE SYSTOLIC * 05/10/6370 21    • Left ventricular stroke * 09/01/2022 37 00    • A4C EF 09/01/2022 69    • LA length (A2C) 09/01/2022 5 00    • LVIDd 09/01/2022 3 70    • IVS 09/01/2022 1 3    • LVIDS 09/01/2022 2 40    • FS 09/01/2022 35    • Asc Ao 09/01/2022 3 1    • Ao root 09/01/2022 3 20    • RVID d 09/01/2022 3 5    • AV LVOT peak gradient 09/01/2022 4    • MV valve area p 1/2 meth* 09/01/2022 3 01    • E wave deceleration time 09/01/2022 250    • AV peak gradient 09/01/2022 9    • MV Peak E Lav[place] 09/01/2022 66    • MV Peak A [place] 09/01/2022 1 06    • ALFREDO A4C 09/01/2022 13 5    • RAA A4C 09/01/2022 11 4    • MV stenosis pressure 1/2* 09/01/2022 73    • LVSV, 2D 09/01/2022 37      Imaging: US Breast Axilla Left    Result Date: 9/30/2022  Narrative: DIAGNOSIS: Abnormal mammogram TECHNIQUE: Ultrasound of the left breast(s) was performed  COMPARISONS: Prior breast imaging dated: 06/30/2022 RELEVANT HISTORY: Family Breast Cancer History: No known family history of breast cancer  Family Medical History: Family medical history includes colon cancer in mother  Personal History: No known relevant hormone history  No known relevant surgical history  No known relevant medical history  RISK ASSESSMENT: [facility] risk assessment reporting was suppressed due to the patient's history and/or demographic factors  INDICATION: Rebecca Subramanian is a 74 y o  male presenting for abnormal mammogram  FINDINGS: Again noted is a lymph node in the left axilla with cortical thickening  Previously measured 5 mm and currently measures 4 mm  Impression:    Resolving left axillary adenopathy  Continued follow-up is recommended with left axillary ultrasound in 6 months  ASSESSMENT/BI-RADS CATEGORY: Left: 3 - Probably Benign Overall: 3 - Probably Benign RECOMMENDATION:      - Ultrasound in 6 months for the left breast  Workstation ID: [id number]      Review of Systems:  Review of Systems   REVIEW OF SYSTEMS:  Constitutional:  Denies fever or chills   Eyes:  Denies change in visual acuity   HENT:  Denies nasal congestion or sore throat   Respiratory:  Denies cough or shortness of breath   Cardiovascular:  Denies chest pain or edema   GI:  Denies abdominal pain, nausea, vomiting, bloody stools or diarrhea   :  Denies dysuria, frequency, difficulty in micturition and nocturia  Musculoskeletal:  Denies back pain or joint pain   Neurologic:  Denies headache, focal weakness or sensory changes   Endocrine:  Denies polyuria or polydipsia   Lymphatic:  Denies swollen glands   Psychiatric:  Denies depression or anxiety     Physical Exam:    /68 (BP Location: Left arm, Patient Position: Sitting, Cuff Size: Standard)   Pulse 57   Resp 16   Ht 5' 9" (1 753 m)   Wt 103 kg (226 lb)   SpO2 94%   BMI 33 37 kg/m²     Physical Exam   PHYSICAL EXAM:  General:  Patient is not in acute distress   Head: Normocephalic, Atraumatic  HEENT:  Both pupils normal-size atraumatic, normocephalic, nonicteric  Neck:  JVP not raised  Trachea central  No carotid bruit  Respiratory:  normal breath sounds no crackles  no rhonchi  Cardiovascular:  Regular rate and rhythm no S3 no murmurs  GI:  Abdomen soft nontender  No organomegaly  Lymphatic:  No cervical or inguinal lymphadenopathy  Neurologic:  Patient is awake alert, oriented   Grossly nonfocal  Extremities no edema      Discussion/Summary:  Patient with paroxysmal atrial fibrillation/flutter as well as SVT  Lengthy discussion with patient and family regarding the findings on cardiac event monitor as well as electrophysiology evaluation by Dr Tejeda      Patient does understand that he has 3 types of supraventricular arrhythmias including SVT, atrial flutter as well as atrial fibrillation  Patient is comfortable with continuation of medical therapy since he feels better with symptoms on higher dose of metoprolol  Patient does understand that there is an option for ablation at some point if he has high burden of atrial arrhythmia or he is severely symptomatic  Options of antiarrhythmic medications also discussed  Patient presently on aspirin  Patient and family had a lot of questions  Etiology and pathogenesis of atrial arrhythmias discussed in detail  Follow-up in 6 months or earlier as needed  Follow-up with primary care physician  Patient is agreeable with the plan of care

## 2023-04-20 PROBLEM — M79.622 AXILLARY TENDERNESS, LEFT: Status: RESOLVED | Noted: 2022-06-28 | Resolved: 2023-04-20

## 2023-04-20 PROBLEM — U07.1 COVID-19 VIRUS INFECTION: Status: RESOLVED | Noted: 2021-09-01 | Resolved: 2023-04-20

## 2023-04-25 DIAGNOSIS — E78.5 HYPERLIPIDEMIA, UNSPECIFIED HYPERLIPIDEMIA TYPE: ICD-10-CM

## 2023-04-25 RX ORDER — ATORVASTATIN CALCIUM 40 MG/1
TABLET, FILM COATED ORAL
Qty: 90 TABLET | Refills: 3 | Status: SHIPPED | OUTPATIENT
Start: 2023-04-25

## 2023-05-30 DIAGNOSIS — K21.9 GASTROESOPHAGEAL REFLUX DISEASE WITHOUT ESOPHAGITIS: ICD-10-CM

## 2023-05-30 RX ORDER — PANTOPRAZOLE SODIUM 40 MG/1
TABLET, DELAYED RELEASE ORAL
Qty: 180 TABLET | Refills: 3 | Status: SHIPPED | OUTPATIENT
Start: 2023-05-30

## 2023-06-19 PROBLEM — J01.00 ACUTE NON-RECURRENT MAXILLARY SINUSITIS: Status: RESOLVED | Noted: 2020-11-02 | Resolved: 2023-06-19

## 2023-06-27 ENCOUNTER — OFFICE VISIT (OUTPATIENT)
Dept: FAMILY MEDICINE CLINIC | Facility: CLINIC | Age: 68
End: 2023-06-27
Payer: MEDICARE

## 2023-06-27 VITALS
DIASTOLIC BLOOD PRESSURE: 74 MMHG | WEIGHT: 233 LBS | HEIGHT: 69 IN | HEART RATE: 78 BPM | TEMPERATURE: 97.8 F | OXYGEN SATURATION: 96 % | BODY MASS INDEX: 34.51 KG/M2 | SYSTOLIC BLOOD PRESSURE: 128 MMHG

## 2023-06-27 DIAGNOSIS — Z12.5 PROSTATE CANCER SCREENING: ICD-10-CM

## 2023-06-27 DIAGNOSIS — R73.01 IMPAIRED FASTING GLUCOSE: ICD-10-CM

## 2023-06-27 DIAGNOSIS — Z00.00 MEDICARE ANNUAL WELLNESS VISIT, SUBSEQUENT: ICD-10-CM

## 2023-06-27 DIAGNOSIS — N13.8 BPH WITH OBSTRUCTION/LOWER URINARY TRACT SYMPTOMS: Primary | ICD-10-CM

## 2023-06-27 DIAGNOSIS — I47.1 SVT (SUPRAVENTRICULAR TACHYCARDIA) (HCC): ICD-10-CM

## 2023-06-27 DIAGNOSIS — E78.5 HYPERLIPIDEMIA, UNSPECIFIED HYPERLIPIDEMIA TYPE: ICD-10-CM

## 2023-06-27 DIAGNOSIS — N40.1 BPH WITH OBSTRUCTION/LOWER URINARY TRACT SYMPTOMS: Primary | ICD-10-CM

## 2023-06-27 DIAGNOSIS — Z12.11 SCREENING FOR COLON CANCER: ICD-10-CM

## 2023-06-27 DIAGNOSIS — R59.0 LYMPHADENOPATHY, AXILLARY: ICD-10-CM

## 2023-06-27 PROBLEM — B35.6 TINEA CRURIS: Status: RESOLVED | Noted: 2022-04-25 | Resolved: 2023-06-27

## 2023-06-27 PROCEDURE — 99214 OFFICE O/P EST MOD 30 MIN: CPT | Performed by: FAMILY MEDICINE

## 2023-06-27 PROCEDURE — G0439 PPPS, SUBSEQ VISIT: HCPCS | Performed by: FAMILY MEDICINE

## 2023-06-27 RX ORDER — TAMSULOSIN HYDROCHLORIDE 0.4 MG/1
0.4 CAPSULE ORAL
Qty: 30 CAPSULE | Refills: 2 | Status: SHIPPED | OUTPATIENT
Start: 2023-06-27

## 2023-06-27 NOTE — PROGRESS NOTES
Name: Megan Velasquez      : 1955      MRN: [id number]  Encounter Provider: Edith Calloway MD  Encounter Date: 2023   Encounter department: [address] [place] [address]     1  BPH with obstruction/lower urinary tract symptoms  After discussing risks and benefits of medication along with side effects will start the following:  -     tamsulosin (FLOMAX) 0 4 mg; Take 1 capsule (0 4 mg total) by mouth daily with dinner    2  Hyperlipidemia, unspecified hyperlipidemia type  -     Lipid panel; Future    3  Impaired fasting glucose  -     Comprehensive metabolic panel; Future  -     Hemoglobin A1C; Future    4  Prostate cancer screening  -     PSA, Total Screen; Future    5  SVT (supraventricular tachycardia) (HCC)  Denies any symptoms    6  Screening for colon cancer  -     Ambulatory referral to Gastroenterology; Future    7  Lymphadenopathy, axillary  -     US breast left limited (diagnostic); Future; Expected date: 2023    8  Medicare annual wellness visit, subsequent  See Medicare wellness note    Follow up 1-2 months         Subjective     Patient is her because he has been having urinary frequency and urgency with associated dribbling  Has a Hx of DVT denies any palpitations symptoms  Also was found to have lymphadenopathy of left axilla  Review of Systems   Constitutional: Negative for activity change, appetite change, fatigue and fever  HENT: Negative for congestion and ear discharge  Respiratory: Negative for cough and shortness of breath  Cardiovascular: Negative for chest pain and palpitations  Gastrointestinal: Negative for diarrhea and nausea  Genitourinary: Positive for difficulty urinating, frequency and urgency  Musculoskeletal: Negative for arthralgias and back pain  Skin: Negative for color change and rash  Neurological: Negative for dizziness and headaches  Psychiatric/Behavioral: Negative for agitation and behavioral problems  Past Medical History:   Diagnosis Date   • Abnormal ECG 2021    Had fluttering of my heart   • Basal cell carcinoma    • GERD (gastroesophageal reflux disease)    • Hyperlipidemia    • Seasonal allergies    • Squamous cell skin cancer 2021    Left neck SCCIS     Past Surgical History:   Procedure Laterality Date   • CHOLECYSTECTOMY     • COLONOSCOPY N/A 10/4/2017    Procedure: COLONOSCOPY;  Surgeon: Yovanny Dang MD;  Location: [facility]; Service: Gastroenterology   • EAR SURGERY      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)   • MOHS SURGERY  2021    left neck    • [place] ESOPHAGOGASTRODUODENOSCOPY TRANSORAL DIAGNOSTIC N/A 2018    Procedure: ESOPHAGOGASTRODUODENOSCOPY (EGD); Surgeon: Yovanny Dang MD;  Location: [facility]; Service: Gastroenterology   • SINUS SURGERY     • SKIN BIOPSY       Family History   Problem Relation Age of Onset   • Colon cancer Mother    • Other Mother         septicemia   • Hypertension Father    • Heart attack Father         NSTEMI   • Hypertension Sister    • Squamous cell carcinoma Sister    • Hypertension Brother         Has defibulator and pace maker   • Cancer Brother    • Basal cell carcinoma Brother      Social History     Socioeconomic History   • Marital status: /Civil Union     Spouse name: None   • Number of children: None   • Years of education: None   • Highest education level: None   Occupational History   • None   Tobacco Use   • Smoking status: Former     Packs/day: 1 00     Years: 3 00     Total pack years: 3 00     Types: Cigarettes     Start date: 1970     Quit date:      Years since quittin 5   • Smokeless tobacco: Never   Vaping Use   • Vaping Use: Never used   Substance and Sexual Activity   • Alcohol use: Yes     Alcohol/week: 3 0 standard drinks of alcohol     Types: 3 Glasses of wine per week     Comment:  Occ wine   • Drug use: No   • Sexual activity: Yes     Partners: Female     Birth control/protection: Female "Sterilization   Other Topics Concern   • None   Social History Narrative   • None     Social Determinants of Health     Financial Resource Strain: Unknown (6/20/2023)    Overall Financial Resource Strain (CARDIA)    • Difficulty of Paying Living Expenses: Patient refused   Food Insecurity: Not on file   Transportation Needs: Unknown (6/20/2023)    PRAPARE - Transportation    • Lack of Transportation (Medical): Patient refused    • Lack of Transportation (Non-Medical): Patient refused   Physical Activity: Not on file   Stress: Not on file   Social Connections: Not on file   Intimate Partner Violence: Not on file   Housing Stability: Not on file     Current Outpatient Medications on File Prior to Visit   Medication Sig   • ALPRAZolam (XANAX) 0 25 mg tablet Take 1 tablet (0 25 mg total) by mouth daily as needed for anxiety   • ASPIRIN LOW DOSE 81 MG EC tablet TAKE 1 TABLET DAILY   • atorvastatin (LIPITOR) 40 mg tablet TAKE 1 TABLET DAILY   • fluticasone (FLONASE) 50 mcg/act nasal spray SQUIRT 1 SPRAY INTO EACH NOSTRIL DAILY AS NEEDED   • metoprolol succinate (TOPROL-XL) 50 mg 24 hr tablet TAKE 1 TABLET DAILY   • pantoprazole (PROTONIX) 40 mg tablet TAKE 1 TABLET TWICE A DAY   • Probiotic Product (Align) 4 MG CAPS      No Known Allergies  Immunization History   Administered Date(s) Administered   • COVID-19 MODERNA VACC 0 5 ML IM 03/23/2021, 04/20/2021   • COVID-19 PFIZER VACCINE 0 3 ML IM 12/20/2021   • Influenza, recombinant, quadrivalent,injectable, preservative free 10/26/2020   • Influenza, seasonal, injectable 1955, 1955   • Pneumococcal Conjugate Vaccine 20-valent (Pcv20), Polysace 04/25/2022   • Tdap 04/22/2016       Objective     /74 (BP Location: Left arm, Patient Position: Sitting)   Pulse 78   Temp 97 8 °F (36 6 °C) (Temporal)   Ht 5' 9\" (1 753 m)   Wt 106 kg (233 lb)   SpO2 96%   BMI 34 41 kg/m²     Physical Exam  Constitutional:       General: He is not in acute distress       " "Appearance: He is well-developed  He is not diaphoretic  Eyes:      General: No scleral icterus  Pupils: Pupils are equal, round, and reactive to light  Cardiovascular:      Rate and Rhythm: Normal rate and regular rhythm  Heart sounds: Normal heart sounds  No murmur heard  Pulmonary:      Effort: Pulmonary effort is normal  No respiratory distress  Breath sounds: Normal breath sounds  No wheezing  Abdominal:      General: Bowel sounds are normal  There is no distension  Palpations: Abdomen is soft  Tenderness: There is no abdominal tenderness  Genitourinary:     Comments: Enlarged prostate however smooth  Skin:     General: Skin is warm and dry  Findings: No rash  Neurological:      Mental Status: He is alert and oriented to person, place, and time  Brittany Marrero MD  Answers for HPI/ROS submitted by the patient on 6/20/2023  How would you rate your overall health?: good  Compared to last year, how is your physical health?: same  In general, how satisfied are you with your life?: satisfied  Compared to last year, how is your eyesight?: slightly worse  Compared to last year, how is your hearing?: same  Compared to last year, how is your emotional/mental health?: same  How often is anger a problem for you?: never, rarely  How often do you feel unusually tired/fatigued?: sometimes  In the past 7 days, how much pain have you experienced?: some  If you answered \"some\" or \"a lot\", please rate the severity of your pain on a scale of 1 to 10 (1 being the least severe pain and 10 being the most intense pain)  : 2/10  In the past 6 months, have you lost or gained 10 pounds without trying?: No  One or more falls in the last year: No  Do you have trouble with the stairs inside or outside your home?: No  Does your home have working smoke alarms?: Yes  Does your home have a carbon monoxide monitor?: Yes  Which safety hazards (if any) have you experienced in your home?  Please select all " that apply : none  How would you describe your current diet?  Please select all that apply : Frequent junk food  In addition to prescription medications, are you taking any over-the-counter supplements?: No  Can you manage your medications?: Yes  Are you currently taking any opioid medications?: No  Can you walk and transfer into and out of your bed and chair?: Yes  Can you dress and groom yourself?: Yes  Can you bathe or shower yourself?: Yes  Can you feed yourself?: Yes  Can you do your laundry/ housekeeping?: Yes  Can you manage your money, pay your bills, and track your expenses?: Yes  Can you make your own meals?: Yes  Can you do your own shopping?: Yes  Within the last 12 months, have you had any hospitalizations or Emergency Department visits?: No  Do you have a living will?: No  Do you have a Durable POA (Power of ) for healthcare decisions?: No  Do you have an Advanced Directive for end of life decisions?: No  How often have you used an illegal drug (including marijuana) or a prescription medication for non-medical reasons in the past year?: never  What is the typical number of drinks you consume in a day?: 1  What is the typical number of drinks you consume in a week?: 4  How often did you have a drink containing alcohol in the past year?: never  How many drinks did you have on a typical day  when you were drinking in the past year?: 0  How often did you have 6 or more drinks on one occasion in the past year?: never

## 2023-06-27 NOTE — PROGRESS NOTES
Assessment and Plan:     Problem List Items Addressed This Visit    None       Preventive health issues were discussed with patient, and age appropriate screening tests were ordered as noted in patient's After Visit Summary  Personalized health advice and appropriate referrals for health education or preventive services given if needed, as noted in patient's After Visit Summary  History of Present Illness:     Patient presents for a Medicare Wellness Visit    HPI   Patient Care Team:  Denis Cortes MD as PCP - General (Family Medicine)  Quirino Madrid, Antoni Zuniga MD as Endoscopist     Review of Systems:     Review of Systems     Problem List:     Patient Active Problem List   Diagnosis   • Gastroesophageal reflux disease without esophagitis   • Hyperlipidemia   • Diabetes mellitus screening   • Prostate cancer screening   • Dyspepsia   • Serum total bilirubin elevated   • Right hip pain   • Impaired fasting glucose   • Memory loss   • Anxiety   • Behavioral change   • Memory difficulty   • Palpitation   • PAC (premature atrial contraction)   • Tinea cruris   • Atrial fibrillation ([facility] [address] )   • Atrial flutter [facility])      Past Medical and Surgical History:     Past Medical History:   Diagnosis Date   • Abnormal ECG 5/2021    Had fluttering of my heart   • Basal cell carcinoma    • GERD (gastroesophageal reflux disease)    • Hyperlipidemia    • Seasonal allergies    • Squamous cell skin cancer 04/26/2021    Left neck SCCIS     Past Surgical History:   Procedure Laterality Date   • CHOLECYSTECTOMY     • COLONOSCOPY N/A 10/4/2017    Procedure: COLONOSCOPY;  Surgeon: Adilson Larson MD;  Location: [facility]; Service: Gastroenterology   • EAR SURGERY      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)   • MOHS SURGERY  05/04/2021    left neck    • [place] ESOPHAGOGASTRODUODENOSCOPY TRANSORAL DIAGNOSTIC N/A 5/31/2018    Procedure: ESOPHAGOGASTRODUODENOSCOPY (EGD);   Surgeon: Adilson Larson MD;  Location: [facility]; Service: Gastroenterology   • SINUS SURGERY     • SKIN BIOPSY        Family History:     Family History   Problem Relation Age of Onset   • Colon cancer Mother    • Other Mother         septicemia   • Hypertension Father    • Heart attack Father         NSTEMI   • Hypertension Sister    • Squamous cell carcinoma Sister    • Hypertension Brother         Has defibulator and pace maker   • Cancer Brother    • Basal cell carcinoma Brother       Social History:     Social History     Socioeconomic History   • Marital status: /Civil Union     Spouse name: None   • Number of children: None   • Years of education: None   • Highest education level: None   Occupational History   • None   Tobacco Use   • Smoking status: Former     Packs/day: 1 00     Years: 3 00     Total pack years: 3 00     Types: Cigarettes     Start date: 1970     Quit date:      Years since quittin 5   • Smokeless tobacco: Never   Vaping Use   • Vaping Use: Never used   Substance and Sexual Activity   • Alcohol use: Yes     Alcohol/week: 3 0 standard drinks of alcohol     Types: 3 Glasses of wine per week     Comment:  Occ wine   • Drug use: No   • Sexual activity: Yes     Partners: Female     Birth control/protection: Female Sterilization   Other Topics Concern   • None   Social History Narrative   • None     Social Determinants of Health     Financial Resource Strain: Unknown (2023)    Overall Financial Resource Strain (CARDIA)    • Difficulty of Paying Living Expenses: Patient refused   Food Insecurity: Not on file   Transportation Needs: Unknown (2023)    PRAPARE - Transportation    • Lack of Transportation (Medical): Patient refused    • Lack of Transportation (Non-Medical): Patient refused   Physical Activity: Not on file   Stress: Not on file   Social Connections: Not on file   Intimate Partner Violence: Not on file   Housing Stability: Not on file      Medications and Allergies:     Current Outpatient Medications Medication Sig Dispense Refill   • ALPRAZolam (XANAX) 0 25 mg tablet Take 1 tablet (0 25 mg total) by mouth daily as needed for anxiety 30 tablet 2   • ASPIRIN LOW DOSE 81 MG EC tablet TAKE 1 TABLET DAILY 90 tablet 3   • atorvastatin (LIPITOR) 40 mg tablet TAKE 1 TABLET DAILY 90 tablet 3   • fluticasone (FLONASE) 50 mcg/act nasal spray SQUIRT 1 SPRAY INTO EACH NOSTRIL DAILY AS NEEDED 48 mL 2   • metoprolol succinate (TOPROL-XL) 50 mg 24 hr tablet TAKE 1 TABLET DAILY 90 tablet 3   • pantoprazole (PROTONIX) 40 mg tablet TAKE 1 TABLET TWICE A  tablet 3   • Probiotic Product (Align) 4 MG CAPS        No current facility-administered medications for this visit  No Known Allergies   Immunizations:     Immunization History   Administered Date(s) Administered   • COVID-19 MODERNA VACC 0 5 ML IM 03/23/2021, 04/20/2021   • COVID-19 PFIZER VACCINE 0 3 ML IM 12/20/2021   • Influenza, recombinant, quadrivalent,injectable, preservative free 10/26/2020   • Influenza, seasonal, injectable [birth year]   • Pneumococcal Conjugate Vaccine 20-valent (Pcv20), Polysace 04/25/2022   • Tdap 04/22/2016      Health Maintenance:         Topic Date Due   • Colorectal Cancer Screening  10/04/2022   • Hepatitis C Screening  Completed         Topic Date Due   • COVID-19 Vaccine (4 - Moderna series) 02/14/2022   • Influenza Vaccine (Season Ended) 09/01/2023      Medicare Screening Tests and Risk Assessments:     Miracle Vazquez is here for his Subsequent Wellness visit  Health Risk Assessment:   Patient rates overall health as good  Patient feels that their physical health rating is same  Patient is satisfied with their life  Eyesight was rated as slightly worse  Hearing was rated as same  Patient feels that their emotional and mental health rating is same  Patients states they are never, rarely angry  Patient states they are sometimes unusually tired/fatigued  Pain experienced in the last 7 days has been some   Patient's pain rating has been 2/10  Patient states that he has experienced no weight loss or gain in last 6 months  Fall Risk Screening: In the past year, patient has experienced: no history of falling in past year      Home Safety:  Patient does not have trouble with stairs inside or outside of their home  Patient has working smoke alarms and has working carbon monoxide detector  Home safety hazards include: none  Nutrition:   Current diet is Frequent junk food  Medications:   Patient is not currently taking any over-the-counter supplements  Patient is able to manage medications  Activities of Daily Living (ADLs)/Instrumental Activities of Daily Living (IADLs):   Walk and transfer into and out of bed and chair?: Yes  Dress and groom yourself?: Yes    Bathe or shower yourself?: Yes    Feed yourself?  Yes  Do your laundry/housekeeping?: Yes  Manage your money, pay your bills and track your expenses?: Yes  Make your own meals?: Yes    Do your own shopping?: Yes    Previous Hospitalizations:   Any hospitalizations or ED visits within the last 12 months?: No      Advance Care Planning:   Living will: No    Durable POA for healthcare: No    Advanced directive: No      PREVENTIVE SCREENINGS      Cardiovascular Screening:    General: History Lipid Disorder    Due for: Lipid Panel      Diabetes Screening:       Due for: Blood Glucose      Colorectal Cancer Screening:     General: Screening Current      Prostate Cancer Screening:      Due for: PSA      Osteoporosis Screening:    General: Screening Not Indicated      Abdominal Aortic Aneurysm (AAA) Screening:    Risk factors include: age between 73-69 yo and tobacco use        Lung Cancer Screening:     General: Screening Not Indicated      Hepatitis C Screening:    General: Screening Current    Screening, Brief Intervention, and Referral to Treatment (SBIRT)    Screening  Typical number of drinks in a day: 1  Typical number of drinks in a week: 4  Interpretation: Low risk "drinking behavior  AUDIT-C Screenin) How often did you have a drink containing alcohol in the past year? never  2) How many drinks did you have on a typical day when you were drinking in the past year? 0  3) How often did you have 6 or more drinks on one occasion in the past year? never    AUDIT-C Score: 0  Interpretation: Score 0-3 (male): Negative screen for alcohol misuse    Single Item Drug Screening:  How often have you used an illegal drug (including marijuana) or a prescription medication for non-medical reasons in the past year? never    Single Item Drug Screen Score: 0  Interpretation: Negative screen for possible drug use disorder    No results found       Physical Exam:     /74 (BP Location: Left arm, Patient Position: Sitting)   Pulse 78   Temp 97 8 °F (36 6 °C) (Temporal)   Ht 5' 9\" (1 753 m)   Wt 106 kg (233 lb)   SpO2 96%   BMI 34 41 kg/m²     Physical Exam     Bethany Meredith MD  "

## 2023-06-30 LAB
ALBUMIN SERPL-MCNC: 4.4 G/DL (ref 3.8–4.8)
ALBUMIN/GLOB SERPL: 2.2 {RATIO} (ref 1.2–2.2)
ALP SERPL-CCNC: 66 IU/L (ref 44–121)
ALT SERPL-CCNC: 40 IU/L (ref 0–44)
AST SERPL-CCNC: 25 IU/L (ref 0–40)
BILIRUB SERPL-MCNC: 0.8 MG/DL (ref 0–1.2)
BUN SERPL-MCNC: 13 MG/DL (ref 8–27)
BUN/CREAT SERPL: 11 (ref 10–24)
CALCIUM SERPL-MCNC: 9.7 MG/DL (ref 8.6–10.2)
CHLORIDE SERPL-SCNC: 103 MMOL/L (ref 96–106)
CHOLEST SERPL-MCNC: 161 MG/DL (ref 100–199)
CO2 SERPL-SCNC: 23 MMOL/L (ref 20–29)
CREAT SERPL-MCNC: 1.18 MG/DL (ref 0.76–1.27)
EGFR: 68 ML/MIN/1.73
GLOBULIN SER-MCNC: 2 G/DL (ref 1.5–4.5)
GLUCOSE SERPL-MCNC: 126 MG/DL (ref 70–99)
HBA1C MFR BLD: 6.4 % (ref 4.8–5.6)
HDLC SERPL-MCNC: 42 MG/DL
LDLC SERPL CALC-MCNC: 96 MG/DL (ref 0–99)
POTASSIUM SERPL-SCNC: 4.6 MMOL/L (ref 3.5–5.2)
PROT SERPL-MCNC: 6.4 G/DL (ref 6–8.5)
PSA SERPL-MCNC: 2.4 NG/ML (ref 0–4)
SL AMB VLDL CHOLESTEROL CALC: 23 MG/DL (ref 5–40)
SODIUM SERPL-SCNC: 141 MMOL/L (ref 134–144)
TRIGL SERPL-MCNC: 130 MG/DL (ref 0–149)

## 2023-07-04 DIAGNOSIS — R73.01 IMPAIRED FASTING GLUCOSE: Primary | ICD-10-CM

## 2023-07-07 ENCOUNTER — TELEPHONE (OUTPATIENT)
Dept: FAMILY MEDICINE CLINIC | Facility: CLINIC | Age: 68
End: 2023-07-07

## 2023-07-07 NOTE — TELEPHONE ENCOUNTER
Patient called asking to speak with you. Since starting the tamsulosin he has been experiencing pro-longed heart palpitations. This morning it lasted an hour.  He is concerned and would like to discuss with you

## 2023-07-11 ENCOUNTER — OFFICE VISIT (OUTPATIENT)
Dept: FAMILY MEDICINE CLINIC | Facility: CLINIC | Age: 68
End: 2023-07-11
Payer: MEDICARE

## 2023-07-11 VITALS
HEIGHT: 69 IN | SYSTOLIC BLOOD PRESSURE: 132 MMHG | OXYGEN SATURATION: 96 % | DIASTOLIC BLOOD PRESSURE: 80 MMHG | TEMPERATURE: 98 F | HEART RATE: 55 BPM | BODY MASS INDEX: 34.96 KG/M2 | WEIGHT: 236 LBS

## 2023-07-11 DIAGNOSIS — I48.0 PAROXYSMAL ATRIAL FIBRILLATION (HCC): ICD-10-CM

## 2023-07-11 DIAGNOSIS — N13.8 BPH WITH OBSTRUCTION/LOWER URINARY TRACT SYMPTOMS: Primary | ICD-10-CM

## 2023-07-11 DIAGNOSIS — N40.1 BPH WITH OBSTRUCTION/LOWER URINARY TRACT SYMPTOMS: Primary | ICD-10-CM

## 2023-07-11 PROBLEM — M25.551 RIGHT HIP PAIN: Status: RESOLVED | Noted: 2019-04-15 | Resolved: 2023-07-11

## 2023-07-11 PROCEDURE — 99214 OFFICE O/P EST MOD 30 MIN: CPT | Performed by: FAMILY MEDICINE

## 2023-07-11 RX ORDER — FINASTERIDE 5 MG/1
5 TABLET, FILM COATED ORAL DAILY
Qty: 30 TABLET | Refills: 2 | Status: SHIPPED | OUTPATIENT
Start: 2023-07-11

## 2023-07-11 NOTE — PROGRESS NOTES
Name: Anderson Babinski      : 1955      MRN: [id number]  Encounter Provider: Deborha Holland MD  Encounter Date: 2023   Encounter department: [address] [place] [address]     1. BPH with obstruction/lower urinary tract symptoms  Stop Flomax  After discussing risks and benefits of medication along with side effects will start the following:  -     finasteride (PROSCAR) 5 mg tablet; Take 1 tablet (5 mg total) by mouth daily    2. Paroxysmal atrial fibrillation (HCC)  Continue current medications    F/U in 1-2 months or as needed         Subjective     Patient is here because he took flomax however started developing worsening palpitations. He has stopped taking the medication since. Review of Systems   Constitutional: Negative for activity change, appetite change, fatigue and fever. HENT: Negative for congestion and ear discharge. Respiratory: Negative for cough and shortness of breath. Cardiovascular: Negative for chest pain and palpitations. Gastrointestinal: Negative for diarrhea and nausea. Musculoskeletal: Negative for arthralgias and back pain. Skin: Negative for color change and rash. Neurological: Negative for dizziness and headaches. Psychiatric/Behavioral: Negative for agitation and behavioral problems. Past Medical History:   Diagnosis Date   • Abnormal ECG 2021    Had fluttering of my heart   • Basal cell carcinoma    • GERD (gastroesophageal reflux disease)    • Hyperlipidemia    • Seasonal allergies    • Squamous cell skin cancer 2021    Left neck SCCIS     Past Surgical History:   Procedure Laterality Date   • CHOLECYSTECTOMY     • COLONOSCOPY N/A 10/4/2017    Procedure: COLONOSCOPY;  Surgeon: Belen Genao MD;  Location: [facility];   Service: Gastroenterology   • EAR SURGERY      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)   • MOHS SURGERY  2021    left neck    • [place] ESOPHAGOGASTRODUODENOSCOPY TRANSORAL DIAGNOSTIC N/A 2018 Procedure: ESOPHAGOGASTRODUODENOSCOPY (EGD); Surgeon: Ibrahima Livingston MD;  Location: [facility]; Service: Gastroenterology   • SINUS SURGERY     • SKIN BIOPSY       Family History   Problem Relation Age of Onset   • Colon cancer Mother    • Other Mother         septicemia   • Hypertension Father    • Heart attack Father         NSTEMI   • Hypertension Sister    • Squamous cell carcinoma Sister    • Hypertension Brother         Has defibulator and pace maker   • Cancer Brother    • Basal cell carcinoma Brother      Social History     Socioeconomic History   • Marital status: /Civil Union     Spouse name: None   • Number of children: None   • Years of education: None   • Highest education level: None   Occupational History   • None   Tobacco Use   • Smoking status: Former     Packs/day: 1.00     Years: 3.00     Total pack years: 3.00     Types: Cigarettes     Start date: 1970     Quit date:      Years since quittin.5   • Smokeless tobacco: Never   Vaping Use   • Vaping Use: Never used   Substance and Sexual Activity   • Alcohol use: Yes     Alcohol/week: 3.0 standard drinks of alcohol     Types: 3 Glasses of wine per week     Comment:  Occ wine   • Drug use: No   • Sexual activity: Yes     Partners: Female     Birth control/protection: Female Sterilization   Other Topics Concern   • None   Social History Narrative   • None     Social Determinants of Health     Financial Resource Strain: Unknown (2023)    Overall Financial Resource Strain (CARDIA)    • Difficulty of Paying Living Expenses: Patient refused   Food Insecurity: Not on file   Transportation Needs: Unknown (2023)    PRAPARE - Transportation    • Lack of Transportation (Medical): Patient refused    • Lack of Transportation (Non-Medical): Patient refused   Physical Activity: Not on file   Stress: Not on file   Social Connections: Not on file   Intimate Partner Violence: Not on file   Housing Stability: Not on file Current Outpatient Medications on File Prior to Visit   Medication Sig   • ALPRAZolam (XANAX) 0.25 mg tablet Take 1 tablet (0.25 mg total) by mouth daily as needed for anxiety   • ASPIRIN LOW DOSE 81 MG EC tablet TAKE 1 TABLET DAILY   • atorvastatin (LIPITOR) 40 mg tablet TAKE 1 TABLET DAILY   • fluticasone (FLONASE) 50 mcg/act nasal spray SQUIRT 1 SPRAY INTO EACH NOSTRIL DAILY AS NEEDED   • metoprolol succinate (TOPROL-XL) 50 mg 24 hr tablet TAKE 1 TABLET DAILY   • pantoprazole (PROTONIX) 40 mg tablet TAKE 1 TABLET TWICE A DAY   • Probiotic Product (Align) 4 MG CAPS    • tamsulosin (FLOMAX) 0.4 mg Take 1 capsule (0.4 mg total) by mouth daily with dinner     No Known Allergies  Immunization History   Administered Date(s) Administered   • COVID-19 MODERNA VACC 0.5 ML IM 03/23/2021, 04/20/2021   • COVID-19 PFIZER VACCINE 0.3 ML IM 12/20/2021   • Influenza, recombinant, quadrivalent,injectable, preservative free 10/26/2020   • Influenza, seasonal, injectable 1955, 1955   • Pneumococcal Conjugate Vaccine 20-valent (Pcv20), Polysace 04/25/2022   • Tdap 04/22/2016       Objective     /80 (BP Location: Left arm, Patient Position: Sitting, Cuff Size: Large)   Pulse 55   Temp 98 °F (36.7 °C)   Ht 5' 9" (1.753 m)   Wt 107 kg (236 lb)   SpO2 96%   BMI 34.85 kg/m²     Physical Exam  Constitutional:       General: He is not in acute distress. Appearance: He is well-developed. He is not diaphoretic. Eyes:      General: No scleral icterus. Pupils: Pupils are equal, round, and reactive to light. Cardiovascular:      Rate and Rhythm: Normal rate and regular rhythm. Heart sounds: Normal heart sounds. No murmur heard. Pulmonary:      Effort: Pulmonary effort is normal. No respiratory distress. Breath sounds: Normal breath sounds. No wheezing. Abdominal:      General: Bowel sounds are normal. There is no distension. Palpations: Abdomen is soft. Tenderness:  There is no abdominal tenderness. Skin:     General: Skin is warm and dry. Findings: No rash. Neurological:      Mental Status: He is alert and oriented to person, place, and time.        Dolly Shin MD

## 2023-07-13 ENCOUNTER — OFFICE VISIT (OUTPATIENT)
Dept: CARDIOLOGY CLINIC | Facility: CLINIC | Age: 68
End: 2023-07-13
Payer: MEDICARE

## 2023-07-13 VITALS
HEIGHT: 69 IN | DIASTOLIC BLOOD PRESSURE: 78 MMHG | RESPIRATION RATE: 16 BRPM | WEIGHT: 231 LBS | HEART RATE: 65 BPM | OXYGEN SATURATION: 98 % | SYSTOLIC BLOOD PRESSURE: 138 MMHG | BODY MASS INDEX: 34.21 KG/M2

## 2023-07-13 DIAGNOSIS — I48.92 ATRIAL FLUTTER, UNSPECIFIED TYPE (HCC): ICD-10-CM

## 2023-07-13 DIAGNOSIS — F41.9 ANXIETY: ICD-10-CM

## 2023-07-13 DIAGNOSIS — I48.0 PAROXYSMAL ATRIAL FIBRILLATION (HCC): Primary | ICD-10-CM

## 2023-07-13 DIAGNOSIS — I10 ESSENTIAL HYPERTENSION: ICD-10-CM

## 2023-07-13 PROCEDURE — 99214 OFFICE O/P EST MOD 30 MIN: CPT

## 2023-07-13 RX ORDER — ASPIRIN 81 MG/1
81 TABLET ORAL DAILY
Qty: 90 TABLET | Refills: 0 | Status: SHIPPED | OUTPATIENT
Start: 2023-07-13

## 2023-07-13 RX ORDER — ALPRAZOLAM 0.25 MG/1
0.25 TABLET ORAL DAILY PRN
Qty: 30 TABLET | Refills: 0 | Status: SHIPPED | OUTPATIENT
Start: 2023-07-13

## 2023-07-13 NOTE — PROGRESS NOTES
[facility] [place]  [address] [facility] [address]  Cardiology Office Note    Cherri Norris 79 y.o. male MRN: [id number]    07/13/23          Assessment/Plan:  1. Paroxysmal atrial fibrillation  • Patient currently in sinus rhythm  • Noted 2 episodes of tachycardia (atrial flutter vs SVT); attributed to recent medication change, no symptoms since switched to finasteride  • Continue Toprol XL 50 mg daily. • Consider event monitor if patient has recurring symptoms  • [id number] 1; continue aspirin  • Patient advised that if his A1c continues to increase, will need further discussion regarding anticoagulation since it would increase his risk of CVA. 2. SVT  • Continue Toprol XL 50 mg daily  • See plan above    Follow up: 6 months or sooner as needed  All questions and concerns addressed. Patient was advised to report any problems requiring medical attention. 1. Paroxysmal atrial fibrillation ([address])        2. Atrial flutter, unspecified type [facility])            HPI: Cherri Norris is a 79y.o. year old male with PMH of paroxysmal atrial fibrillation/flutter, SVT who presents for follow-up. Patient is known to Dr. Uvaldo Camejo. Patient notes that he had two episodes of tachycardia noted on his Ranku sierra. His most recent episode was at the end of last week. He notes episodes of tachycardia occurred after he has been started on tamsulosin. Tamsulosin was discontinued earlier this week and patient was started on finasteride. Patient denies any further episodes of tachycardia. Patient noted palpitations at the time of the event. He denies any chest pain or shortness of breath. Reviewed cardiac rhythm strip which appears to be atrial flutter with HR in the 140s and then patient converted back to sinus rhythm. The episode lasted a little over an hour. Patient is known to EP.  They have discussed ablation in the past but patient would like to start with medical management first before pursuing any procedures at this time. Patient denies regular exercise. He is on aspirin for [facility]. He notes that he is working to improve his diet since his A1c showed prediabetes. Patient was instructed to call the office or seek medical attention if any significant chest pain, shortness of breath, palpitations, or lower extremity swelling develop. All medications reviewed and patient is tolerating medications without side effects. Family History:   Father- HTN, MI    Social history:   Patient denies tobacco, significant alcohol, or recreational drug use.           Patient Active Problem List   Diagnosis   • Gastroesophageal reflux disease without esophagitis   • Hyperlipidemia   • Diabetes mellitus screening   • Prostate cancer screening   • Dyspepsia   • Serum total bilirubin elevated   • Impaired fasting glucose   • Memory loss   • Anxiety   • Behavioral change   • Memory difficulty   • Palpitation   • PAC (premature atrial contraction)   • Atrial fibrillation (HCC)   • Atrial flutter (HCC)   • Medicare annual wellness visit, subsequent   • BPH with obstruction/lower urinary tract symptoms   • Lymphadenopathy, axillary       No Known Allergies      Current Outpatient Medications:   •  atorvastatin (LIPITOR) 40 mg tablet, TAKE 1 TABLET DAILY, Disp: 90 tablet, Rfl: 3  •  finasteride (PROSCAR) 5 mg tablet, Take 1 tablet (5 mg total) by mouth daily, Disp: 30 tablet, Rfl: 2  •  fluticasone (FLONASE) 50 mcg/act nasal spray, SQUIRT 1 SPRAY INTO EACH NOSTRIL DAILY AS NEEDED, Disp: 48 mL, Rfl: 2  •  metoprolol succinate (TOPROL-XL) 50 mg 24 hr tablet, TAKE 1 TABLET DAILY, Disp: 90 tablet, Rfl: 3  •  pantoprazole (PROTONIX) 40 mg tablet, TAKE 1 TABLET TWICE A DAY, Disp: 180 tablet, Rfl: 3  •  Probiotic Product (Align) 4 MG CAPS, , Disp: , Rfl:   •  ALPRAZolam (XANAX) 0.25 mg tablet, Take 1 tablet (0.25 mg total) by mouth daily as needed for anxiety, Disp: 30 tablet, Rfl: 0  •  aspirin (Aspirin Low Dose) 81 mg EC tablet, Take 1 tablet (81 mg total) by mouth daily, Disp: 90 tablet, Rfl: 0    Past Medical History:   Diagnosis Date   • Abnormal ECG 2021    Had fluttering of my heart   • Basal cell carcinoma    • GERD (gastroesophageal reflux disease)    • Hyperlipidemia    • Seasonal allergies    • Squamous cell skin cancer 2021    Left neck SCCIS       Family History   Problem Relation Age of Onset   • Colon cancer Mother    • Other Mother         septicemia   • Hypertension Father    • Heart attack Father         NSTEMI   • Hypertension Sister    • Squamous cell carcinoma Sister    • Hypertension Brother         Has defibulator and pace maker   • Cancer Brother    • Basal cell carcinoma Brother        Past Surgical History:   Procedure Laterality Date   • CHOLECYSTECTOMY     • COLONOSCOPY N/A 10/4/2017    Procedure: COLONOSCOPY;  Surgeon: Monroe Adams MD;  Location: [facility]; Service: Gastroenterology   • EAR SURGERY      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)   • MOHS SURGERY  2021    left neck    • [place] ESOPHAGOGASTRODUODENOSCOPY TRANSORAL DIAGNOSTIC N/A 2018    Procedure: ESOPHAGOGASTRODUODENOSCOPY (EGD); Surgeon: Monroe Adams MD;  Location: [facility]; Service: Gastroenterology   • SINUS SURGERY     • SKIN BIOPSY         Social History     Socioeconomic History   • Marital status: /Civil Union     Spouse name: Not on file   • Number of children: Not on file   • Years of education: Not on file   • Highest education level: Not on file   Occupational History   • Not on file   Tobacco Use   • Smoking status: Former     Packs/day: 1.00     Years: 3.00     Total pack years: 3.00     Types: Cigarettes     Start date: 1970     Quit date:      Years since quittin.5   • Smokeless tobacco: Never   Vaping Use   • Vaping Use: Never used   Substance and Sexual Activity   • Alcohol use:  Yes     Alcohol/week: 3.0 standard drinks of alcohol     Types: 3 Glasses of wine per week     Comment: Occ wine   • Drug use: No   • Sexual activity: Yes     Partners: Female     Birth control/protection: Female Sterilization   Other Topics Concern   • Not on file   Social History Narrative   • Not on file     Social Determinants of Health     Financial Resource Strain: Unknown (6/20/2023)    Overall Financial Resource Strain (CARDIA)    • Difficulty of Paying Living Expenses: Patient refused   Food Insecurity: Not on file   Transportation Needs: Unknown (6/20/2023)    PRAPARE - Transportation    • Lack of Transportation (Medical): Patient refused    • Lack of Transportation (Non-Medical): Patient refused   Physical Activity: Not on file   Stress: Not on file   Social Connections: Not on file   Intimate Partner Violence: Not on file   Housing Stability: Not on file       Review of symptoms:   Review of Systems   Constitutional: Negative for chills, diaphoresis and fever. Respiratory: Negative for cough, chest tightness and shortness of breath. Cardiovascular: Positive for palpitations. Negative for chest pain and leg swelling. Gastrointestinal: Negative for abdominal distention, blood in stool, nausea and vomiting. Genitourinary: Negative for difficulty urinating. Musculoskeletal: Negative for arthralgias and back pain. Neurological: Negative for dizziness, syncope, light-headedness and headaches. Psychiatric/Behavioral: Negative for agitation and confusion. The patient is not nervous/anxious. Vitals: /78 (BP Location: Left arm, Patient Position: Sitting, Cuff Size: Standard)   Pulse 65   Resp 16   Ht 5' 9" (1.753 m)   Wt 105 kg (231 lb)   SpO2 98%   BMI 34.11 kg/m²         Physical Exam:     Physical Exam  Vitals and nursing note reviewed. Constitutional:       General: He is not in acute distress. Appearance: He is well-developed. HENT:      Head: Normocephalic and atraumatic.    Eyes:      Conjunctiva/sclera: Conjunctivae normal. Neck:      Vascular: No carotid bruit. Cardiovascular:      Rate and Rhythm: Normal rate and regular rhythm. Heart sounds: Normal heart sounds. No murmur heard. Pulmonary:      Effort: Pulmonary effort is normal. No respiratory distress. Breath sounds: Normal breath sounds. Abdominal:      Palpations: Abdomen is soft. Tenderness: There is no abdominal tenderness. Musculoskeletal:         General: No swelling. Cervical back: Neck supple. Right lower leg: No edema. Left lower leg: No edema. Skin:     General: Skin is warm and dry. Capillary Refill: Capillary refill takes less than 2 seconds. Neurological:      Mental Status: He is alert and oriented to person, place, and time. Psychiatric:         Mood and Affect: Mood normal.            Thank you for allowing me to participate in the care and evaluation of your patient. Should you have any questions, please feel free to contact me.

## 2023-07-17 ENCOUNTER — TELEPHONE (OUTPATIENT)
Dept: UROLOGY | Facility: MEDICAL CENTER | Age: 68
End: 2023-07-17

## 2023-07-17 NOTE — TELEPHONE ENCOUNTER
New Patient    What is the reason for the patient’s appointment?:  BPH  What office location does the patient prefer?:[place]/ [facility]  MD only   Does patient have Imaging/Lab Results:    Have patient records been requested?:  If No, are the records showing in Epic:       INSURANCE:   Do we accept the patient's insurance or is the patient Self-Pay?:    Insurance Provider: Medicare   Plan Type/Number:   Member ID#:       HISTORY:   Has the patient had any previous Urologist(s)? :No    Was the patient seen in the ED?: No    Has the patient had any outside testing done?:No    Does the patient have a personal history of cancer?:  No

## 2023-07-19 ENCOUNTER — TELEPHONE (OUTPATIENT)
Age: 68
End: 2023-07-19

## 2023-07-19 ENCOUNTER — PREP FOR PROCEDURE (OUTPATIENT)
Age: 68
End: 2023-07-19

## 2023-07-19 DIAGNOSIS — Z86.010 HISTORY OF COLON POLYPS: Primary | ICD-10-CM

## 2023-07-19 DIAGNOSIS — Z12.11 SCREENING FOR COLON CANCER: Primary | ICD-10-CM

## 2023-07-19 NOTE — TELEPHONE ENCOUNTER
07/19/23  Screened by: Phoenix Ledesma    Referring Provider     Pre- Screening: There is no height or weight on file to calculate BMI. Has patient been referred for a routine screening Colonoscopy? yes  Is the patient between 43-73 years old? yes      Previous Colonoscopy yes   If yes:    Date: 2017    Facility: [address] [place] [address]    Reason:       SCHEDULING STAFF: If the patient is between 45yrs-49yrs, please advise patient to confirm benefits/coverage with their insurance company for a routine screening colonoscopy, some insurance carriers will only cover at [address] [place] [address], [facility] or older. If the patient is over 66years old, please schedule an office visit. Does the patient want to see a Gastroenterologist prior to their procedure OR are they having any GI symptoms? no    Has the patient been hospitalized or had abdominal surgery in the past 6 months? no    Does the patient use supplemental oxygen? no    Does the patient take Coumadin, Lovenox, Plavix, Elliquis, Xarelto, or other blood thinning medication? no    Has the patient had a stroke, cardiac event, or stent placed in the past year? no    SCHEDULING STAFF: If patient answers NO to above questions, then schedule procedure. If patient answers YES to above questions, then schedule office appointment. PASSED OA    If patient is between 45yrs - 49yrs, please advise patient that we will have to confirm benefits & coverage with their insurance company for a routine screening colonoscopy.

## 2023-07-19 NOTE — TELEPHONE ENCOUNTER
Scheduled date of colonoscopy (as of today):  9/19/23  Physician performing colonoscopy: Geronimo Pulliam  [facility] of colonoscopy[facility]  Instructions reviewed with patient by: SENT [facility]OL[facility] TO [facility]  Clearances: N/A

## 2023-07-28 ENCOUNTER — TELEPHONE (OUTPATIENT)
Dept: CARDIOLOGY CLINIC | Facility: CLINIC | Age: 68
End: 2023-07-28

## 2023-07-28 ENCOUNTER — TELEPHONE (OUTPATIENT)
Dept: FAMILY MEDICINE CLINIC | Facility: CLINIC | Age: 68
End: 2023-07-28

## 2023-07-28 NOTE — TELEPHONE ENCOUNTER
Patient is recently taking finasteride (PROSCAR) 5 mg tablet and woke up sweating, heart fluttering and heart rate of 113. Please advise if he should continue taking this.  [phone]

## 2023-07-31 DIAGNOSIS — N40.1 BPH WITH OBSTRUCTION/LOWER URINARY TRACT SYMPTOMS: Primary | ICD-10-CM

## 2023-07-31 DIAGNOSIS — N13.8 BPH WITH OBSTRUCTION/LOWER URINARY TRACT SYMPTOMS: Primary | ICD-10-CM

## 2023-07-31 RX ORDER — SILODOSIN 8 MG/1
1 CAPSULE ORAL
Qty: 30 CAPSULE | Refills: 3 | Status: SHIPPED | OUTPATIENT
Start: 2023-07-31

## 2023-07-31 NOTE — TELEPHONE ENCOUNTER
Patient also sent you a message through [employer] about this. Yes Rapaflo.  Please review that message from patient in patient advice requests

## 2023-08-01 ENCOUNTER — TELEPHONE (OUTPATIENT)
Dept: UROLOGY | Facility: CLINIC | Age: 68
End: 2023-08-01

## 2023-08-01 NOTE — TELEPHONE ENCOUNTER
Pt called and stated 8:30 r/s is to early in the morning for how far away he is.  After review of the schedule I was unable to fine another available date with in a reasonable time frame please assist

## 2023-08-01 NOTE — TELEPHONE ENCOUNTER
Spoke with pt, was able to reschedule him up in [place] with jasmin as a new pt with a sooner appt. Pt agreeable.

## 2023-08-02 DIAGNOSIS — N13.8 BPH WITH OBSTRUCTION/LOWER URINARY TRACT SYMPTOMS: ICD-10-CM

## 2023-08-02 DIAGNOSIS — N40.1 BPH WITH OBSTRUCTION/LOWER URINARY TRACT SYMPTOMS: ICD-10-CM

## 2023-08-02 RX ORDER — FINASTERIDE 5 MG/1
5 TABLET, FILM COATED ORAL DAILY
Qty: 90 TABLET | Refills: 1 | Status: SHIPPED | OUTPATIENT
Start: 2023-08-02

## 2023-08-23 DIAGNOSIS — N40.1 BPH WITH OBSTRUCTION/LOWER URINARY TRACT SYMPTOMS: ICD-10-CM

## 2023-08-23 DIAGNOSIS — N13.8 BPH WITH OBSTRUCTION/LOWER URINARY TRACT SYMPTOMS: ICD-10-CM

## 2023-08-23 RX ORDER — SILODOSIN 8 MG/1
1 CAPSULE ORAL
Qty: 90 CAPSULE | Refills: 2 | Status: SHIPPED | OUTPATIENT
Start: 2023-08-23

## 2023-08-25 LAB — HBA1C MFR BLD HPLC: 6.6 %

## 2023-08-26 PROBLEM — Z00.00 MEDICARE ANNUAL WELLNESS VISIT, SUBSEQUENT: Status: RESOLVED | Noted: 2023-06-27 | Resolved: 2023-08-26

## 2023-08-26 LAB
ALBUMIN SERPL-MCNC: 4.3 G/DL (ref 3.9–4.9)
ALBUMIN/GLOB SERPL: 2.4 {RATIO} (ref 1.2–2.2)
ALP SERPL-CCNC: 67 IU/L (ref 44–121)
ALT SERPL-CCNC: 36 IU/L (ref 0–44)
AST SERPL-CCNC: 23 IU/L (ref 0–40)
BILIRUB SERPL-MCNC: 0.7 MG/DL (ref 0–1.2)
BUN SERPL-MCNC: 13 MG/DL (ref 8–27)
BUN/CREAT SERPL: 11 (ref 10–24)
CALCIUM SERPL-MCNC: 9.3 MG/DL (ref 8.6–10.2)
CHLORIDE SERPL-SCNC: 104 MMOL/L (ref 96–106)
CHOLEST SERPL-MCNC: 157 MG/DL (ref 100–199)
CO2 SERPL-SCNC: 26 MMOL/L (ref 20–29)
CREAT SERPL-MCNC: 1.14 MG/DL (ref 0.76–1.27)
EGFR: 71 ML/MIN/1.73
GLOBULIN SER-MCNC: 1.8 G/DL (ref 1.5–4.5)
GLUCOSE SERPL-MCNC: 124 MG/DL (ref 70–99)
HBA1C MFR BLD: 6.6 % (ref 4.8–5.6)
HDLC SERPL-MCNC: 43 MG/DL
LDLC SERPL CALC-MCNC: 90 MG/DL (ref 0–99)
POTASSIUM SERPL-SCNC: 5.2 MMOL/L (ref 3.5–5.2)
PROT SERPL-MCNC: 6.1 G/DL (ref 6–8.5)
PSA SERPL-MCNC: 2.2 NG/ML (ref 0–4)
SL AMB REFLEX CRITERIA: NORMAL
SL AMB VLDL CHOLESTEROL CALC: 24 MG/DL (ref 5–40)
SODIUM SERPL-SCNC: 143 MMOL/L (ref 134–144)
TRIGL SERPL-MCNC: 136 MG/DL (ref 0–149)

## 2023-09-11 ENCOUNTER — OFFICE VISIT (OUTPATIENT)
Dept: UROLOGY | Facility: CLINIC | Age: 68
End: 2023-09-11
Payer: MEDICARE

## 2023-09-11 VITALS
DIASTOLIC BLOOD PRESSURE: 76 MMHG | WEIGHT: 233 LBS | SYSTOLIC BLOOD PRESSURE: 136 MMHG | BODY MASS INDEX: 34.41 KG/M2 | HEART RATE: 68 BPM

## 2023-09-11 DIAGNOSIS — Z12.5 PROSTATE CANCER SCREENING: ICD-10-CM

## 2023-09-11 DIAGNOSIS — N40.0 BENIGN PROSTATIC HYPERPLASIA, UNSPECIFIED WHETHER LOWER URINARY TRACT SYMPTOMS PRESENT: Primary | ICD-10-CM

## 2023-09-11 LAB — POST-VOID RESIDUAL VOLUME, ML POC: 53 ML

## 2023-09-11 PROCEDURE — 99203 OFFICE O/P NEW LOW 30 MIN: CPT

## 2023-09-11 PROCEDURE — 51798 US URINE CAPACITY MEASURE: CPT

## 2023-09-11 NOTE — PROGRESS NOTES
9/11/2023    Chief Complaint   Patient presents with   • Benign Prostatic Hypertrophy       Assessment and Plan    79 y.o. male new patient to office    1. BPH  · Doing well with silodosin 8 mg HS. · He failed Flomax and Proscar due to adverse effects of palpitations  · PVR today 53 mL  · Continue with silodosin 8 mg HS and follow-up in 1 year. We did discuss that should this no longer be effective he would likely need to consider surgical intervention and will need to be schedule for cystoscopy evaluation. We could alternatively try a non-selective alpha blocker as well but would have low threshold to consider surgical management in the future. 2. Prostate cancer screening  · PSA normal at 2.2  · Reports normal rectal exam through PCP  · Continue yearly prostate cancer screening through PCP until age 78  · Follow-up as needed      History of Present Illness  Charly Goldberg is a 79 y.o. male new patient to office with PMHx significant for Afib, GERD, HLD, and BPH. Here for evaluation of lower urinary tract symptoms. He reports that several months ago he started with lower urinary tract symptoms of weak urinary weak, urinary hesitancy, starting and stopping urination. He was seen by his PCP and started on Flomax and saw immediate improvement in his symptoms. However, soon after starting Flomax he started to experience retrograde ejaculation and well as palpitations. His PCP switched him to Proscar, but also experience palpitations as well. He was then switched to silodosin and is currently taking 8 mg HS. He has been taking this for about 2 months and he reports no adverse side effects from this and has had good symptoms improvement. Prostate cancer screening: Current PSA 2.2 as of 8/25/2023 previously 2.4 on 6/29/2023 and 2.5 on 4/13/2022. Review of Systems   Constitutional: Negative for chills and fever. HENT: Negative for congestion and sore throat.     Respiratory: Negative for cough and shortness of breath. Cardiovascular: Negative for chest pain and leg swelling. Gastrointestinal: Negative for abdominal pain, constipation and diarrhea. Genitourinary: Negative for difficulty urinating, dysuria, frequency, hematuria and urgency. Musculoskeletal: Negative for back pain and gait problem. Skin: Negative for wound. Allergic/Immunologic: Negative for immunocompromised state. Hematological: Does not bruise/bleed easily. AUA SYMPTOM SCORE    Flowsheet Row Most Recent Value   AUA SYMPTOM SCORE    How often have you had a sensation of not emptying your bladder completely after you finished urinating? 0 (P)     How often have you had to urinate again less than two hours after you finished urinating? 1 (P)     How often have you found you stopped and started again several times when you urinate? 3 (P)     How often have you found it difficult to postpone urination? 2 (P)     How often have you had a weak urinary stream? 3 (P)     How often have you had to push or strain to begin urination? 3 (P)     How many times did you most typically get up to urinate from the time you went to bed at night until the time you got up in the morning? 1 (P)     Quality of Life: If you were to spend the rest of your life with your urinary condition just the way it is now, how would you feel about that? 4 (P)     AUA SYMPTOM SCORE 13 (P)           Vitals  Vitals:    09/11/23 1353   BP: 136/76   Pulse: 68   Weight: 106 kg (233 lb)       Physical Exam  Vitals reviewed. Constitutional:       General: He is not in acute distress. Appearance: Normal appearance. He is not ill-appearing or toxic-appearing. HENT:      Head: Normocephalic and atraumatic. Eyes:      General: No scleral icterus. Conjunctiva/sclera: Conjunctivae normal.   Cardiovascular:      Rate and Rhythm: Normal rate. Pulmonary:      Effort: Pulmonary effort is normal. No respiratory distress. Abdominal:      Tenderness:  There is no right CVA tenderness or left CVA tenderness. Hernia: No hernia is present. Musculoskeletal:      Cervical back: Normal range of motion. Right lower leg: No edema. Left lower leg: No edema. Skin:     General: Skin is warm and dry. Coloration: Skin is not jaundiced or pale. Neurological:      General: No focal deficit present. Mental Status: He is alert and oriented to person, place, and time. Mental status is at baseline. Gait: Gait normal.   Psychiatric:         Mood and Affect: Mood normal.         Behavior: Behavior normal.         Thought Content: Thought content normal.         Judgment: Judgment normal.         Past History  Past Medical History:   Diagnosis Date   • Abnormal ECG 2021    Had fluttering of my heart   • Basal cell carcinoma    • GERD (gastroesophageal reflux disease)    • Hyperlipidemia    • Seasonal allergies    • Squamous cell skin cancer 2021    Left neck SCCIS     Social History     Socioeconomic History   • Marital status: /Civil Union     Spouse name: None   • Number of children: None   • Years of education: None   • Highest education level: None   Occupational History   • None   Tobacco Use   • Smoking status: Former     Packs/day: 1.00     Years: 3.00     Total pack years: 3.00     Types: Cigarettes     Start date: 1970     Quit date:      Years since quittin.7   • Smokeless tobacco: Never   Vaping Use   • Vaping Use: Never used   Substance and Sexual Activity   • Alcohol use: Yes     Alcohol/week: 3.0 standard drinks of alcohol     Types: 3 Glasses of wine per week     Comment:  Occ wine   • Drug use: No   • Sexual activity: Yes     Partners: Female     Birth control/protection: Female Sterilization   Other Topics Concern   • None   Social History Narrative   • None     Social Determinants of Health     Financial Resource Strain: Unknown (2023)    Overall Financial Resource Strain (CARDIA)    • Difficulty of Paying Living Expenses: Patient refused   Food Insecurity: Not on file   Transportation Needs: Unknown (2023)    PRAPARE - Transportation    • Lack of Transportation (Medical): Patient refused    • Lack of Transportation (Non-Medical): Patient refused   Physical Activity: Not on file   Stress: Not on file   Social Connections: Not on file   Intimate Partner Violence: Not on file   Housing Stability: Not on file     Social History     Tobacco Use   Smoking Status Former   • Packs/day: 1.00   • Years: 3.00   • Total pack years: 3.00   • Types: Cigarettes   • Start date: 1970   • Quit date:    • Years since quittin.7   Smokeless Tobacco Never     Family History   Problem Relation Age of Onset   • Colon cancer Mother    • Other Mother         septicemia   • Hypertension Father    • Heart attack Father         NSTEMI   • Hypertension Sister    • Squamous cell carcinoma Sister    • Hypertension Brother         Has defibulator and pace maker   • Cancer Brother    • Basal cell carcinoma Brother        The following portions of the patient's history were reviewed and updated as appropriate allergies, current medications, past medical history, past social history, past surgical history and problem list    Imaging:    Results  Recent Results (from the past 1 hour(s))   POCT Measure PVR    Collection Time: 23  2:03 PM   Result Value Ref Range    POST-VOID RESIDUAL VOLUME, ML POC 53 mL   ]  Lab Results   Component Value Date    PSA 2.2 2023    PSA 2.4 2023    PSA 2.5 2022    PSA 1.9 2021     Lab Results   Component Value Date    GLUCOSE 107 (H) 2016    CALCIUM 9.6 2016     2016    K 5.2 2023    CO2 26 2023     2023    BUN 13 2023    CREATININE 1.14 2023     Lab Results   Component Value Date    WBC 9.6 2021    HGB 17.2 2021    HCT 51.2 (H) 2021    MCV 90 2021     2021       Please Note:  Voice dictation software has been used to create this document. There may be inadvertent transcriptions errors.      GUILLERMO Varela 09/11/23

## 2023-09-19 ENCOUNTER — HOSPITAL ENCOUNTER (OUTPATIENT)
Dept: GASTROENTEROLOGY | Facility: HOSPITAL | Age: 68
Setting detail: OUTPATIENT SURGERY
Discharge: HOME | End: 2023-09-19
Attending: INTERNAL MEDICINE | Admitting: INTERNAL MEDICINE
Payer: MEDICARE

## 2023-09-19 ENCOUNTER — ANESTHESIA EVENT (OUTPATIENT)
Dept: GASTROENTEROLOGY | Facility: HOSPITAL | Age: 68
End: 2023-09-19

## 2023-09-19 ENCOUNTER — ANESTHESIA (OUTPATIENT)
Dept: GASTROENTEROLOGY | Facility: HOSPITAL | Age: 68
End: 2023-09-19

## 2023-09-19 VITALS
WEIGHT: 229.72 LBS | HEART RATE: 60 BPM | DIASTOLIC BLOOD PRESSURE: 60 MMHG | SYSTOLIC BLOOD PRESSURE: 131 MMHG | BODY MASS INDEX: 32.89 KG/M2 | OXYGEN SATURATION: 96 % | TEMPERATURE: 97 F | HEIGHT: 70 IN | RESPIRATION RATE: 16 BRPM

## 2023-09-19 DIAGNOSIS — Z86.010 HISTORY OF COLON POLYPS: ICD-10-CM

## 2023-09-19 PROCEDURE — 88305 TISSUE EXAM BY PATHOLOGIST: CPT | Performed by: STUDENT IN AN ORGANIZED HEALTH CARE EDUCATION/TRAINING PROGRAM

## 2023-09-19 RX ORDER — PROPOFOL 10 MG/ML
INJECTION, EMULSION INTRAVENOUS AS NEEDED
Status: DISCONTINUED | OUTPATIENT
Start: 2023-09-19 | End: 2023-09-19

## 2023-09-19 RX ORDER — SODIUM CHLORIDE, SODIUM LACTATE, POTASSIUM CHLORIDE, CALCIUM CHLORIDE 600; 310; 30; 20 MG/100ML; MG/100ML; MG/100ML; MG/100ML
INJECTION, SOLUTION INTRAVENOUS CONTINUOUS PRN
Status: DISCONTINUED | OUTPATIENT
Start: 2023-09-19 | End: 2023-09-19

## 2023-09-19 RX ORDER — LIDOCAINE HYDROCHLORIDE 20 MG/ML
INJECTION, SOLUTION EPIDURAL; INFILTRATION; INTRACAUDAL; PERINEURAL AS NEEDED
Status: DISCONTINUED | OUTPATIENT
Start: 2023-09-19 | End: 2023-09-19

## 2023-09-19 RX ADMIN — SODIUM CHLORIDE, SODIUM LACTATE, POTASSIUM CHLORIDE, AND CALCIUM CHLORIDE: .6; .31; .03; .02 INJECTION, SOLUTION INTRAVENOUS at 10:54

## 2023-09-19 RX ADMIN — PROPOFOL 50 MG: 10 INJECTION, EMULSION INTRAVENOUS at 11:02

## 2023-09-19 RX ADMIN — LIDOCAINE HYDROCHLORIDE 100 MG: 20 INJECTION, SOLUTION EPIDURAL; INFILTRATION; INTRACAUDAL; PERINEURAL at 10:55

## 2023-09-19 RX ADMIN — PROPOFOL 50 MG: 10 INJECTION, EMULSION INTRAVENOUS at 11:05

## 2023-09-19 RX ADMIN — PROPOFOL 50 MG: 10 INJECTION, EMULSION INTRAVENOUS at 10:57

## 2023-09-19 RX ADMIN — PROPOFOL 100 MG: 10 INJECTION, EMULSION INTRAVENOUS at 10:55

## 2023-09-19 NOTE — ANESTHESIA PREPROCEDURE EVALUATION
Procedure:  COLONOSCOPY    Relevant Problems   ANESTHESIA (within normal limits)      CARDIO   (+) Atrial fibrillation (HCC) (ablation rec, currently attempting medical managment)   (+) Atrial flutter (HCC)   (+) Hyperlipidemia   (+) PAC (premature atrial contraction)      ENDO (within normal limits)  pre-DM      GI/HEPATIC  NPO confirmed  BMI 33   (+) Gastroesophageal reflux disease without esophagitis (well controlled with meds as long as he does not eat and lay down)      /RENAL   (+) BPH with obstruction/lower urinary tract symptoms      HEMATOLOGY (within normal limits)      NEURO/PSYCH   (+) Anxiety      PULMONARY (within normal limits)   (-) URI (upper respiratory infection)      Other   (+) Lymphadenopathy, axillary   -metoprolol 2 days ago  -ASA 2 days ago    No Known Allergies     Social History     Tobacco Use   • Smoking status: Former     Packs/day: 1.00     Years: 3.00     Total pack years: 3.00     Types: Cigarettes     Start date: 1970     Quit date:      Years since quittin.7   • Smokeless tobacco: Never   Vaping Use   • Vaping Use: Never used   Substance Use Topics   • Alcohol use: Yes     Alcohol/week: 3.0 standard drinks of alcohol     Types: 3 Glasses of wine per week     Comment: Occ wine   • Drug use: No     Current Outpatient Medications   Medication Instructions   • ALPRAZolam (XANAX) 0.25 mg, Oral, Daily PRN   • aspirin (ASPIRIN LOW DOSE) 81 mg, Oral, Daily   • atorvastatin (LIPITOR) 40 mg tablet TAKE 1 TABLET DAILY   • finasteride (PROSCAR) 5 mg, Oral, Daily   • fluticasone (FLONASE) 50 mcg/act nasal spray SQUIRT 1 SPRAY INTO EACH NOSTRIL DAILY AS NEEDED   • metoprolol succinate (TOPROL-XL) 50 mg 24 hr tablet TAKE 1 TABLET DAILY   • pantoprazole (PROTONIX) 40 mg tablet TAKE 1 TABLET TWICE A DAY   • polyethylene glycol (GOLYTELY) 4000 mL solution 4,000 mL, Oral, Once   • Probiotic Product (Align) 4 MG CAPS No dose, route, or frequency recorded.    • Silodosin 8 MG CAPS 1 capsule, Oral, Daily at bedtime     Lab Results   Component Value Date    WBC 9.6 04/21/2021    HGB 17.2 04/21/2021    HCT 51.2 (H) 04/21/2021     04/21/2021    SODIUM 143 08/25/2023    K 5.2 08/25/2023     08/25/2023    CO2 26 08/25/2023    BUN 13 08/25/2023    CREATININE 1.14 08/25/2023    GLUC 124 (H) 08/25/2023    HGBA1C 6.6 (H) 08/25/2023    AST 23 08/25/2023    ALT 36 08/25/2023    ALKPHOS 73 12/12/2016    TBILI 0.7 08/25/2023    ALB 4.3 08/25/2023     Vitals:    09/19/23 1029   BP: (!) 178/82   Pulse: 74   Resp: 16   Temp: (!) 97.4 °F (36.3 °C)   SpO2: 95%     NM Stress test 9/1/22  •  Pt was initially exercised. He reached stage IV (9 min 57 sec) and got exhausted but did not achieve his THR. So he was administered Regadenoson which, however, caused pt to go into a SVT. Aminophylline had to be administered to break the SVT into sinus rhythm. •  Stress ECG: No ST deviation is noted. The ECG was not diagnostic due to pharmacological (vasodilator) stress. •  Perfusion: Perfusion defect in the inferior wall seen in the rest and supine stress images was not seen in the prone stress images indicating that it was likely a diaphragmatic attenuation artifact. •  Stress Function: Left ventricular function post-stress is normal. Post-stress ejection fraction is 73 %. •  Stress Combined Conclusion: The ECG and SPECT imaging portions of the stress study are concordant with no evidence of stress induced myocardial ischemia. Echo 9/1/22  •  Left Ventricle: Left ventricular cavity size is normal. Wall thickness is mildly increased. There is mild concentric hypertrophy. Systolic function is normal (65%). Wall motion is normal. Diastolic function is mildly abnormal, consistent with grade I (abnormal) relaxation. •  Right Ventricle: Right ventricular cavity size is normal. Systolic function is normal.  •  Mitral Valve: There is trace regurgitation. •  Tricuspid Valve: There is trace regurgitation.  The right ventricular systolic pressure is normal.    Holter 10/4/22  Patient had a min HR of 44 bpm, max HR of 162 bpm, and avg HR of 63 bpm.  Predominant underlying rhythm was Sinus Rhythm. Bundle Branch Block/IVCD was  present. 12 Supraventricular Tachycardia runs occurred, the run with the fastest  interval lasting 10 beats with a max rate of 162 bpm, the longest lasting 19 beats  with an avg rate of 115 bpm. Some episodes of Supraventricular Tachycardia may  be possible Atrial Tachycardia with variable block. Atrial Fibrillation/Flutter occurred  (3% burden), ranging from  bpm (avg of 79 bpm), the longest lasting 8 hours  1 min with an avg rate of 72 bpm. Atrial Fibrillation/Flutter was detected within +/-  45 seconds of symptomatic patient event(s). Isolated SVEs were rare (<1.0%), SVE  Couplets were rare (<1.0%), and SVE Triplets were rare (<1.0%). Isolated VEs were  rare (<1.0%), VE Couplets were rare (<1.0%), and no VE Triplets were present. Physical Exam    Airway    Mallampati score: II  TM Distance: >3 FB  Neck ROM: full     Dental   Comment: Denies loose/chipped teeth, No notable dental hx     Cardiovascular  Rhythm: regular, Rate: normal, Cardiovascular exam normal    Pulmonary  Pulmonary exam normal Breath sounds clear to auscultation,     Other Findings        Anesthesia Plan  ASA Score- 3     Anesthesia Type- IV sedation with anesthesia with ASA Monitors. Additional Monitors:   Airway Plan:     Comment: O2 mask, natural airway, EtCO2 monitor. Risks discussed including awareness, aspiration, drug reactions and conversion to GA. Harrel Maxim Plan Factors-Exercise tolerance (METS): >4 METS. Chart reviewed. EKG reviewed. Existing labs reviewed. Patient summary reviewed. Patient is not a current smoker. Induction- intravenous. Postoperative Plan-     Informed Consent- Anesthetic plan and risks discussed with patient. I personally reviewed this patient with the CRNA.  Discussed and agreed on the Anesthesia Plan with the CRNA. Pasquale Newberry

## 2023-09-19 NOTE — ANESTHESIA POSTPROCEDURE EVALUATION
Post-Op Assessment Note    CV Status:  Stable  Pain Score: 0    Pain management: adequate     Mental Status:  Alert   Hydration Status:  Stable   PONV Controlled:  None   Airway Patency:  Patent      Post Op Vitals Reviewed: Yes      Staff: CRNA         No notable events documented.     /55 (09/19/23 1111)    Temp (!) 97.2 °F (36.2 °C) (09/19/23 1111)    Pulse 64 (09/19/23 1111)   Resp 16 (09/19/23 1111)    SpO2 97 % (09/19/23 1111)

## 2023-09-19 NOTE — H&P
History and Physical - [facility]  Jamison Kirkland 79 y.o. male MRN: [id number]                  HPI: Jamison Kirkland is a 79y.o. year old male who presents for colonoscopy for history of colon polyps. Last colonoscopy 6 years ago      REVIEW OF SYSTEMS: Per the HPI, and otherwise unremarkable. Historical Information   Past Medical History:   Diagnosis Date   • Abnormal ECG 2021    Had fluttering of my heart   • Basal cell carcinoma    • GERD (gastroesophageal reflux disease)    • Hyperlipidemia    • Seasonal allergies    • Squamous cell skin cancer 2021    Left neck SCCIS     Past Surgical History:   Procedure Laterality Date   • CHOLECYSTECTOMY     • COLONOSCOPY N/A 10/4/2017    Procedure: COLONOSCOPY;  Surgeon: Maryse Solano MD;  Location: [facility]; Service: Gastroenterology   • EAR SURGERY      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)   • MOHS SURGERY  2021    left neck    • [place] ESOPHAGOGASTRODUODENOSCOPY TRANSORAL DIAGNOSTIC N/A 2018    Procedure: ESOPHAGOGASTRODUODENOSCOPY (EGD); Surgeon: Maryse Solano MD;  Location: [facility]; Service: Gastroenterology   • SINUS SURGERY     • SKIN BIOPSY       Social History   Social History     Substance and Sexual Activity   Alcohol Use Yes   • Alcohol/week: 3.0 standard drinks of alcohol   • Types: 3 Glasses of wine per week    Comment:  Occ wine     Social History     Substance and Sexual Activity   Drug Use No     Social History     Tobacco Use   Smoking Status Former   • Packs/day: 1.00   • Years: 3.00   • Total pack years: 3.00   • Types: Cigarettes   • Start date: 1970   • Quit date:    • Years since quittin.7   Smokeless Tobacco Never     Family History   Problem Relation Age of Onset   • Colon cancer Mother    • Other Mother         septicemia   • Hypertension Father    • Heart attack Father         NSTEMI   • Hypertension Sister    • Squamous cell carcinoma Sister    • Hypertension Brother Thanks for coming today.  [facility]  [address] [place] [address]    To schedule future appointments in Ortho Clinic, you may call [phone].    To schedule ordered imaging by your provider:   Call [facility] [phone]    To schedule an injection ordered by your provider:  Call [facility] Injection scheduling line: [phone]  [email or web address] available online at:  [email or web address]    Please call if any further questions or concerns ([phone]).  Clinic hours 8 am to 5 pm.    Return to clinic (call) if symptoms worsen or fail to improve.     Has defibulator and pace maker   • Cancer Brother    • Basal cell carcinoma Brother        Meds/Allergies     (Not in a hospital admission)      No Known Allergies    Objective     Blood pressure (!) 178/82, pulse 74, temperature (!) 97.4 °F (36.3 °C), temperature source Temporal, resp. rate 16, height 5' 10" (1.778 m), weight 104 kg (229 lb 11.5 oz), SpO2 95 %.       PHYSICAL EXAM    Gen: NAD  CV: RRR  CHEST: Clear  ABD: soft, NT/ND  EXT: no edema  Neuro: AAO      ASSESSMENT/PLAN:  This is a 79y.o. year old male here for history of colon polyps    PLAN:   Procedure: Colonoscopy

## 2023-09-20 PROCEDURE — 88305 TISSUE EXAM BY PATHOLOGIST: CPT | Performed by: STUDENT IN AN ORGANIZED HEALTH CARE EDUCATION/TRAINING PROGRAM

## 2023-09-25 ENCOUNTER — HOSPITAL ENCOUNTER (OUTPATIENT)
Dept: ULTRASOUND IMAGING | Facility: CLINIC | Age: 68
Discharge: HOME | End: 2023-09-25
Payer: MEDICARE

## 2023-09-25 DIAGNOSIS — R92.8 ABNORMAL MAMMOGRAM: ICD-10-CM

## 2023-09-25 DIAGNOSIS — R92.8 ABNORMAL FINDINGS ON DIAGNOSTIC IMAGING OF BREAST: ICD-10-CM

## 2023-09-25 PROCEDURE — 76642 ULTRASOUND BREAST LIMITED: CPT

## 2023-09-26 DIAGNOSIS — I10 ESSENTIAL HYPERTENSION: ICD-10-CM

## 2023-09-27 RX ORDER — ASPIRIN 81 MG/1
81 TABLET, COATED ORAL DAILY
Qty: 90 TABLET | Refills: 3 | Status: SHIPPED | OUTPATIENT
Start: 2023-09-27

## 2023-12-27 ENCOUNTER — OFFICE VISIT (OUTPATIENT)
Dept: CARDIOLOGY CLINIC | Facility: CLINIC | Age: 68
End: 2023-12-27
Payer: MEDICARE

## 2023-12-27 VITALS
HEIGHT: 69 IN | WEIGHT: 228 LBS | DIASTOLIC BLOOD PRESSURE: 70 MMHG | OXYGEN SATURATION: 98 % | BODY MASS INDEX: 33.77 KG/M2 | RESPIRATION RATE: 16 BRPM | SYSTOLIC BLOOD PRESSURE: 128 MMHG | HEART RATE: 63 BPM

## 2023-12-27 DIAGNOSIS — I47.10 SVT (SUPRAVENTRICULAR TACHYCARDIA): ICD-10-CM

## 2023-12-27 DIAGNOSIS — I48.0 PAROXYSMAL ATRIAL FIBRILLATION (HCC): Primary | ICD-10-CM

## 2023-12-27 PROCEDURE — 99214 OFFICE O/P EST MOD 30 MIN: CPT | Performed by: INTERNAL MEDICINE

## 2023-12-27 NOTE — PROGRESS NOTES
[facility] [place]  [address]  [place] [address]  Cardiology Follow Up    Luca Bowling  1955  [id number]      1. Paroxysmal atrial fibrillation (HCC)        2. SVT (supraventricular tachycardia)            Chief Complaint   Patient presents with    Follow-up       Interval History:   Patient presents for follow-up visit.  Patient denies any history of chest pain shortness of breath.  Patient denies any history of leg edema or orthopnea PND.  No history of presyncope syncope.  Patient states compliance with the present list of medications.    Patient still having palpitations almost every week lasting for a minute.  Patient is now amenable to proceed with options of ablation.      Patient Active Problem List   Diagnosis    Gastroesophageal reflux disease without esophagitis    Hyperlipidemia    Diabetes mellitus screening    Prostate cancer screening    Dyspepsia    Serum total bilirubin elevated    Impaired fasting glucose    Memory loss    Anxiety    Behavioral change    Memory difficulty    Palpitation    PAC (premature atrial contraction)    Atrial fibrillation (HCC)    Atrial flutter (HCC)    BPH with obstruction/lower urinary tract symptoms    Lymphadenopathy, axillary     Past Medical History:   Diagnosis Date    Abnormal ECG 05/2021    Had fluttering of my heart    Basal cell carcinoma     Colon polyp     GERD (gastroesophageal reflux disease)     Hyperlipidemia     Seasonal allergies     Squamous cell skin cancer 04/26/2021    Left neck SCCIS     Social History     Socioeconomic History    Marital status: /Civil Union     Spouse name: Not on file    Number of children: Not on file    Years of education: Not on file    Highest education level: Not on file   Occupational History    Not on file   Tobacco Use    Smoking status: Former     Current packs/day: 0.00     Average packs/day: 1 pack/day for 3.0 years (3.0 ttl pk-yrs)     Types: Cigarettes     Start  date: 1970     Quit date:      Years since quittin.0    Smokeless tobacco: Never   Vaping Use    Vaping status: Never Used   Substance and Sexual Activity    Alcohol use: Yes     Alcohol/week: 3.0 standard drinks of alcohol     Types: 3 Glasses of wine per week     Comment: Occ wine    Drug use: No    Sexual activity: Yes     Partners: Female     Birth control/protection: Female Sterilization   Other Topics Concern    Not on file   Social History Narrative    Not on file     Social Determinants of Health     Financial Resource Strain: Patient Declined (2023)    Overall Financial Resource Strain (CARDIA)     Difficulty of Paying Living Expenses: Patient declined   Food Insecurity: Not on file   Transportation Needs: Patient Declined (2023)    PRAPARE - Transportation     Lack of Transportation (Medical): Patient declined     Lack of Transportation (Non-Medical): Patient declined   Physical Activity: Not on file   Stress: Not on file   Social Connections: Not on file   Intimate Partner Violence: Not on file   Housing Stability: Not on file      Family History   Problem Relation Age of Onset    Colon cancer Mother     Other Mother         septicemia    Hypertension Father     Heart attack Father         NSTEMI    Hypertension Sister     Squamous cell carcinoma Sister     Hypertension Brother         Has defibulator and pace maker    Cancer Brother     Basal cell carcinoma Brother      Past Surgical History:   Procedure Laterality Date    CHOLECYSTECTOMY      COLONOSCOPY N/A 10/4/2017    Procedure: COLONOSCOPY;  Surgeon: Richie Panda MD;  Location: [facility];  Service: Gastroenterology    EAR SURGERY      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)    MOHS SURGERY  2021    left neck     [place] ESOPHAGOGASTRODUODENOSCOPY TRANSORAL DIAGNOSTIC N/A 2018    Procedure: ESOPHAGOGASTRODUODENOSCOPY (EGD);  Surgeon: Richie Panda MD;  Location: [facility];  Service: Gastroenterology    SINUS  SURGERY      SKIN BIOPSY         Current Outpatient Medications:     ALPRAZolam (XANAX) 0.25 mg tablet, Take 1 tablet (0.25 mg total) by mouth daily as needed for anxiety, Disp: 30 tablet, Rfl: 0    Aspirin Low Dose 81 MG EC tablet, TAKE 1 TABLET DAILY, Disp: 90 tablet, Rfl: 3    atorvastatin (LIPITOR) 40 mg tablet, TAKE 1 TABLET DAILY, Disp: 90 tablet, Rfl: 3    fluticasone (FLONASE) 50 mcg/act nasal spray, SQUIRT 1 SPRAY INTO EACH NOSTRIL DAILY AS NEEDED, Disp: 48 mL, Rfl: 2    metoprolol succinate (TOPROL-XL) 50 mg 24 hr tablet, TAKE 1 TABLET DAILY, Disp: 90 tablet, Rfl: 3    pantoprazole (PROTONIX) 40 mg tablet, TAKE 1 TABLET TWICE A DAY, Disp: 180 tablet, Rfl: 3    Probiotic Product (Align) 4 MG CAPS, , Disp: , Rfl:     Silodosin 8 MG CAPS, TAKE 1 CAPSULE BY MOUTH EVERYDAY AT BEDTIME, Disp: 90 capsule, Rfl: 2  No Known Allergies    Labs:  No visits with results within 2 Month(s) from this visit.   Latest known visit with results is:   Hospital Outpatient Visit on 09/19/2023   Component Date Value    Case Report 09/19/2023                      Value:Surgical Pathology Report                         Case: [id number]                                   Authorizing Provider:  Richie Panda MD      Collected:           09/19/2023 1106              Ordering Location:      [facility]       Received:            09/19/2023 [address] [place] [address] Endoscopy                                                             Pathologist:           Suzie Kaur DO                                                           Specimen:    Polyp, Colorectal, mid transverse                                                          Final Diagnosis 09/19/2023                      Value:This result contains rich text formatting which cannot be displayed here.    Additional Information 09/19/2023                      Value:This result contains rich text formatting which cannot be displayed here.     "Synoptic Checklist 09/19/2023                      Value:                            COLON/RECTUM POLYP FORM - GI - All Specimens                                                                                     :    Adenoma(s)      Gross Description 09/19/2023                      Value:This result contains rich text formatting which cannot be displayed here.    Clinical Information 09/19/2023                      Value:Cold snare polypectomy  ·One 6 mm pedunculated polyp in the distal descending colon; completely removed en bloc by cold snare and retrieved specimen     Imaging: No results found.    Review of Systems:  Review of Systems  REVIEW OF SYSTEMS:  Constitutional:  Denies fever or chills   Eyes:  Denies change in visual acuity   HENT:  Denies nasal congestion or sore throat   Respiratory:  Denies cough or shortness of breath   Cardiovascular:  Denies chest pain or edema   GI:  Denies abdominal pain, nausea, vomiting, bloody stools or diarrhea   :  Denies dysuria, frequency, difficulty in micturition and nocturia  Musculoskeletal:  Denies back pain or joint pain   Neurologic:  Denies headache, focal weakness or sensory changes   Endocrine:  Denies polyuria or polydipsia   Lymphatic:  Denies swollen glands   Psychiatric:  Denies depression or anxiety    Physical Exam:    /70 (BP Location: Left arm, Patient Position: Sitting, Cuff Size: Standard)   Pulse 63   Resp 16   Ht 5' 9\" (1.753 m)   Wt 103 kg (228 lb)   SpO2 98%   BMI 33.67 kg/m²     Physical Exam  PHYSICAL EXAM:  General:  Patient is not in acute distress   Head: Normocephalic, Atraumatic.  HEENT:  Both pupils normal-size atraumatic, normocephalic, nonicteric  Neck:  JVP not raised. Trachea central. No carotid bruit  Respiratory:  normal breath sounds no crackles. no rhonchi  Cardiovascular:  Regular rate and rhythm no S3 no murmurs  GI:  Abdomen soft nontender. No organomegaly.   Lymphatic:  No cervical or inguinal " lymphadenopathy  Neurologic:  Patient is awake alert, oriented . Grossly nonfocal  Extremities no edema        Discussion/Summary:    This patient has history of symptomatic atrial fibrillation/flutter.  Patient also has history of SVT.  Patient has been on metoprolol succinate 50 mg p.o. daily.  Patient has palpitations almost every week lasting for 1 to 2 minutes.    Patient presently willing to proceed with options of ablation.  Patient also understands that he may need to be started on anticoagulation if he was scheduled to have ablation procedure.    Patient had a stress test last year which showed no evidence of ischemia with normal ejection fraction.  Echocardiogram showed normal ejection fraction with no significant valvular abnormalities.    Risks benefits and alternatives of ablation options discussed again with patient and family.    Patient has been seen by electrophysiology Dr. Tejeda in the past.    I will send a message to electrophysiology regarding the neck step to schedule ablation.    Follow-up in 6 months or earlier as needed.  Patient is agreeable with the plan of care.

## 2024-01-02 ENCOUNTER — PREP FOR PROCEDURE (OUTPATIENT)
Dept: CARDIOLOGY CLINIC | Facility: CLINIC | Age: 69
End: 2024-01-02

## 2024-01-02 ENCOUNTER — TELEPHONE (OUTPATIENT)
Dept: CARDIOLOGY CLINIC | Facility: CLINIC | Age: 69
End: 2024-01-02

## 2024-01-02 DIAGNOSIS — I48.92 ATRIAL FLUTTER, UNSPECIFIED TYPE (HCC): ICD-10-CM

## 2024-01-02 DIAGNOSIS — I48.0 PAROXYSMAL ATRIAL FIBRILLATION (HCC): Primary | ICD-10-CM

## 2024-01-02 NOTE — TELEPHONE ENCOUNTER
----- Message from Chris Tejeda DO sent at 12/30/2023  9:46 AM EST -----  Regarding: RE: Ablation  Reviewed and agree.  Hold oral ac morning of procedure only. Thanks. henny  ----- Message -----  From: Aishwarya Hoffman PA-C  Sent: 12/29/2023   2:16 PM EST  To: Thania Mueller MD; Chris Tejeda DO; #  Subject: RE: Ablation                                     Hi everyone,    I am not sure if this has already been addressed, but I wanted to pass this on to our schedulers to try and arrange.     Ariella/Goldie, please contact this patient to schedule NANCY/atrial fibrillation and flutter ablation with Dr. Tejeda, jones/NAVX. He hasn't been seen by EP in >1 year, but was just seen by Dr. TAN and he is now in agreement to proceed. If he has questions, I would be happy to call him.     Please let me know if you need anything else, thanks!  Aishwarya     ----- Message -----  From: Thania Mueller MD  Sent: 12/27/2023  10:47 AM EST  To: Aishwarya Hoffman PA-C; Chris Tejeda DO  Subject: Ablation                                         This patient is now agreeable to proceed with the options of ablation.    From last year 2022  STILL HAVING AFIB AND ATRIAL FLUTTER.  DR. TAN PLEASE DISCUSS WITH HIM AT YOUR VISIT ON 10/20.  MY RECOMMENDATION WOULD BE PVI/FLUTTER ABLATION. THANKS. CHRIS     If your office can contact him regarding the neck step to schedule the same.    Has been having symptoms of palpitations almost every week.    Thank you

## 2024-01-02 NOTE — TELEPHONE ENCOUNTER
Patient scheduled for NANCY/A fib/flutter ablation  at [facility] on  02/02/2024 with Dr Tejeda.    Patient aware of general instructions, labs test required.     No Meds holds    Can we please check insurance for service. +    Dr Tejeda, patient agree to scheduled procedure without a consult with you, but would like to have a phone call from you in regard couple of questions that patient have in regard the procedure.  Thank you.

## 2024-01-15 ENCOUNTER — APPOINTMENT (OUTPATIENT)
Dept: LAB | Facility: HOSPITAL | Age: 69
End: 2024-01-15
Payer: MEDICARE

## 2024-01-15 ENCOUNTER — HOSPITAL ENCOUNTER (OUTPATIENT)
Dept: CT IMAGING | Facility: CLINIC | Age: 69
Discharge: HOME | End: 2024-01-15
Payer: MEDICARE

## 2024-01-15 DIAGNOSIS — I48.92 ATRIAL FLUTTER, UNSPECIFIED TYPE (HCC): ICD-10-CM

## 2024-01-15 DIAGNOSIS — I48.0 PAROXYSMAL ATRIAL FIBRILLATION (HCC): ICD-10-CM

## 2024-01-15 LAB
ALBUMIN SERPL BCP-MCNC: 4.3 G/DL (ref 3.5–5)
ALP SERPL-CCNC: 64 U/L (ref 34–104)
ALT SERPL W P-5'-P-CCNC: 32 U/L (ref 7–52)
ANION GAP SERPL CALCULATED.3IONS-SCNC: 5 MMOL/L
AST SERPL W P-5'-P-CCNC: 18 U/L (ref 13–39)
BASOPHILS # BLD AUTO: 0.08 THOUSANDS/ÂΜL (ref 0–0.1)
BASOPHILS NFR BLD AUTO: 1 % (ref 0–1)
BILIRUB SERPL-MCNC: 0.93 MG/DL (ref 0.2–1)
BUN SERPL-MCNC: 14 MG/DL (ref 5–25)
CALCIUM SERPL-MCNC: 9.4 MG/DL (ref 8.4–10.2)
CHLORIDE SERPL-SCNC: 105 MMOL/L (ref 96–108)
CO2 SERPL-SCNC: 31 MMOL/L (ref 21–32)
CREAT SERPL-MCNC: 1.12 MG/DL (ref 0.6–1.3)
EOSINOPHIL # BLD AUTO: 0.38 THOUSAND/ÂΜL (ref 0–0.61)
EOSINOPHIL NFR BLD AUTO: 4 % (ref 0–6)
ERYTHROCYTE [DISTWIDTH] IN BLOOD BY AUTOMATED COUNT: 12.1 % (ref 11.6–15.1)
GFR SERPL CREATININE-BSD FRML MDRD: 68 ML/MIN/1.73SQ M
GLUCOSE P FAST SERPL-MCNC: 151 MG/DL (ref 65–99)
HCT VFR BLD AUTO: 49.9 % (ref 36.5–49.3)
HGB BLD-MCNC: 16.9 G/DL (ref 12–17)
IMM GRANULOCYTES # BLD AUTO: 0.06 THOUSAND/UL (ref 0–0.2)
IMM GRANULOCYTES NFR BLD AUTO: 1 % (ref 0–2)
LYMPHOCYTES # BLD AUTO: 1.82 THOUSANDS/ÂΜL (ref 0.6–4.47)
LYMPHOCYTES NFR BLD AUTO: 20 % (ref 14–44)
MCH RBC QN AUTO: 30.2 PG (ref 26.8–34.3)
MCHC RBC AUTO-ENTMCNC: 33.9 G/DL (ref 31.4–37.4)
MCV RBC AUTO: 89 FL (ref 82–98)
MONOCYTES # BLD AUTO: 1 THOUSAND/ÂΜL (ref 0.17–1.22)
MONOCYTES NFR BLD AUTO: 11 % (ref 4–12)
NEUTROPHILS # BLD AUTO: 5.93 THOUSANDS/ÂΜL (ref 1.85–7.62)
NEUTS SEG NFR BLD AUTO: 63 % (ref 43–75)
NRBC BLD AUTO-RTO: 0 /100 WBCS
PLATELET # BLD AUTO: 245 THOUSANDS/UL (ref 149–390)
PMV BLD AUTO: 9.8 FL (ref 8.9–12.7)
POTASSIUM SERPL-SCNC: 4.5 MMOL/L (ref 3.5–5.3)
PROT SERPL-MCNC: 6.9 G/DL (ref 6.4–8.4)
RBC # BLD AUTO: 5.6 MILLION/UL (ref 3.88–5.62)
SODIUM SERPL-SCNC: 141 MMOL/L (ref 135–147)
WBC # BLD AUTO: 9.27 THOUSAND/UL (ref 4.31–10.16)

## 2024-01-15 PROCEDURE — G1004 CDSM NDSC: HCPCS

## 2024-01-15 PROCEDURE — 75572 CT HRT W/3D IMAGE: CPT

## 2024-01-15 RX ORDER — IODIXANOL 320 MG/ML
120 INJECTION, SOLUTION INTRAVASCULAR
Status: COMPLETED | OUTPATIENT
Start: 2024-01-15 | End: 2024-01-15

## 2024-01-15 RX ADMIN — IODIXANOL 120 ML: 320 INJECTION, SOLUTION INTRAVASCULAR at 09:46

## 2024-02-02 ENCOUNTER — APPOINTMENT (OUTPATIENT)
Dept: NON INVASIVE DIAGNOSTICS | Facility: HOSPITAL | Age: 69
End: 2024-02-02
Attending: INTERNAL MEDICINE
Payer: MEDICARE

## 2024-02-02 ENCOUNTER — ANESTHESIA (OUTPATIENT)
Dept: NON INVASIVE DIAGNOSTICS | Facility: HOSPITAL | Age: 69
End: 2024-02-02
Payer: MEDICARE

## 2024-02-02 ENCOUNTER — HOSPITAL ENCOUNTER (OUTPATIENT)
Facility: HOSPITAL | Age: 69
Setting detail: OUTPATIENT SURGERY
Discharge: HOME | End: 2024-02-03
Attending: INTERNAL MEDICINE | Admitting: INTERNAL MEDICINE
Payer: MEDICARE

## 2024-02-02 ENCOUNTER — ANESTHESIA EVENT (OUTPATIENT)
Dept: NON INVASIVE DIAGNOSTICS | Facility: HOSPITAL | Age: 69
End: 2024-02-02
Payer: MEDICARE

## 2024-02-02 DIAGNOSIS — I48.0 PAROXYSMAL ATRIAL FIBRILLATION (HCC): ICD-10-CM

## 2024-02-02 DIAGNOSIS — I48.92 ATRIAL FLUTTER, UNSPECIFIED TYPE (HCC): ICD-10-CM

## 2024-02-02 LAB
ANION GAP SERPL CALCULATED.3IONS-SCNC: 4 MMOL/L
ATRIAL RATE: 375 BPM
BASOPHILS # BLD AUTO: 0.06 THOUSANDS/ÂΜL (ref 0–0.1)
BASOPHILS NFR BLD AUTO: 0 % (ref 0–1)
BUN SERPL-MCNC: 13 MG/DL (ref 5–25)
CALCIUM SERPL-MCNC: 9.3 MG/DL (ref 8.4–10.2)
CHLORIDE SERPL-SCNC: 108 MMOL/L (ref 96–108)
CO2 SERPL-SCNC: 27 MMOL/L (ref 21–32)
CREAT SERPL-MCNC: 1.01 MG/DL (ref 0.6–1.3)
EOSINOPHIL # BLD AUTO: 0.37 THOUSAND/ÂΜL (ref 0–0.61)
EOSINOPHIL NFR BLD AUTO: 3 % (ref 0–6)
ERYTHROCYTE [DISTWIDTH] IN BLOOD BY AUTOMATED COUNT: 12.2 % (ref 11.6–15.1)
GFR SERPL CREATININE-BSD FRML MDRD: 77 ML/MIN/1.73SQ M
GLUCOSE P FAST SERPL-MCNC: 145 MG/DL (ref 65–99)
GLUCOSE SERPL-MCNC: 145 MG/DL (ref 65–140)
HCT VFR BLD AUTO: 51 % (ref 36.5–49.3)
HGB BLD-MCNC: 17.5 G/DL (ref 12–17)
IMM GRANULOCYTES # BLD AUTO: 0.09 THOUSAND/UL (ref 0–0.2)
IMM GRANULOCYTES NFR BLD AUTO: 1 % (ref 0–2)
INR PPP: 1.02 (ref 0.84–1.19)
KCT BLD-ACNC: 295 SEC (ref 89–137)
KCT BLD-ACNC: 334 SEC (ref 89–137)
KCT BLD-ACNC: 334 SEC (ref 89–137)
KCT BLD-ACNC: 347 SEC (ref 89–137)
KCT BLD-ACNC: 361 SEC (ref 89–137)
KCT BLD-ACNC: 361 SEC (ref 89–137)
KCT BLD-ACNC: 374 SEC (ref 89–137)
KCT BLD-ACNC: 408 SEC (ref 89–137)
LYMPHOCYTES # BLD AUTO: 2.13 THOUSANDS/ÂΜL (ref 0.6–4.47)
LYMPHOCYTES NFR BLD AUTO: 16 % (ref 14–44)
MAGNESIUM SERPL-MCNC: 2.1 MG/DL (ref 1.9–2.7)
MCH RBC QN AUTO: 30.5 PG (ref 26.8–34.3)
MCHC RBC AUTO-ENTMCNC: 34.3 G/DL (ref 31.4–37.4)
MCV RBC AUTO: 89 FL (ref 82–98)
MONOCYTES # BLD AUTO: 1.07 THOUSAND/ÂΜL (ref 0.17–1.22)
MONOCYTES NFR BLD AUTO: 8 % (ref 4–12)
NEUTROPHILS # BLD AUTO: 9.71 THOUSANDS/ÂΜL (ref 1.85–7.62)
NEUTS SEG NFR BLD AUTO: 72 % (ref 43–75)
NRBC BLD AUTO-RTO: 0 /100 WBCS
PLATELET # BLD AUTO: 233 THOUSANDS/UL (ref 149–390)
PMV BLD AUTO: 9.9 FL (ref 8.9–12.7)
POTASSIUM SERPL-SCNC: 4.5 MMOL/L (ref 3.5–5.3)
PROTHROMBIN TIME: 13.3 SECONDS (ref 11.6–14.5)
QRS AXIS: -50 DEGREES
QRSD INTERVAL: 118 MS
QT INTERVAL: 380 MS
QTC INTERVAL: 467 MS
RBC # BLD AUTO: 5.74 MILLION/UL (ref 3.88–5.62)
SODIUM SERPL-SCNC: 139 MMOL/L (ref 135–147)
SPECIMEN SOURCE: ABNORMAL
T WAVE AXIS: -2 DEGREES
VENTRICULAR RATE: 91 BPM
WBC # BLD AUTO: 13.43 THOUSAND/UL (ref 4.31–10.16)

## 2024-02-02 PROCEDURE — 85025 COMPLETE CBC W/AUTO DIFF WBC: CPT | Performed by: PHYSICIAN ASSISTANT

## 2024-02-02 PROCEDURE — 93010 ELECTROCARDIOGRAM REPORT: CPT | Performed by: INTERNAL MEDICINE

## 2024-02-02 PROCEDURE — 93005 ELECTROCARDIOGRAM TRACING: CPT

## 2024-02-02 PROCEDURE — C9113 INJ PANTOPRAZOLE SODIUM, VIA: HCPCS | Performed by: PHYSICIAN ASSISTANT

## 2024-02-02 PROCEDURE — 85347 COAGULATION TIME ACTIVATED: CPT

## 2024-02-02 PROCEDURE — 93312 ECHO TRANSESOPHAGEAL: CPT

## 2024-02-02 PROCEDURE — 85610 PROTHROMBIN TIME: CPT | Performed by: PHYSICIAN ASSISTANT

## 2024-02-02 PROCEDURE — 80048 BASIC METABOLIC PNL TOTAL CA: CPT | Performed by: PHYSICIAN ASSISTANT

## 2024-02-02 PROCEDURE — 83735 ASSAY OF MAGNESIUM: CPT | Performed by: PHYSICIAN ASSISTANT

## 2024-02-02 PROCEDURE — NC001 PR NO CHARGE: Performed by: PHYSICIAN ASSISTANT

## 2024-02-02 RX ORDER — PANTOPRAZOLE SODIUM 40 MG/1
40 TABLET, DELAYED RELEASE ORAL 2 TIMES DAILY
Status: DISCONTINUED | OUTPATIENT
Start: 2024-02-02 | End: 2024-02-03 | Stop reason: HOSPADM

## 2024-02-02 RX ORDER — DEXAMETHASONE SODIUM PHOSPHATE 10 MG/ML
INJECTION, SOLUTION INTRAMUSCULAR; INTRAVENOUS AS NEEDED
Status: DISCONTINUED | OUTPATIENT
Start: 2024-02-02 | End: 2024-02-02

## 2024-02-02 RX ORDER — ONDANSETRON 2 MG/ML
INJECTION INTRAMUSCULAR; INTRAVENOUS AS NEEDED
Status: DISCONTINUED | OUTPATIENT
Start: 2024-02-02 | End: 2024-02-02

## 2024-02-02 RX ORDER — METOCLOPRAMIDE HYDROCHLORIDE 5 MG/ML
10 INJECTION INTRAMUSCULAR; INTRAVENOUS ONCE AS NEEDED
Status: DISCONTINUED | OUTPATIENT
Start: 2024-02-02 | End: 2024-02-02 | Stop reason: HOSPADM

## 2024-02-02 RX ORDER — TAMSULOSIN HYDROCHLORIDE 0.4 MG/1
0.8 CAPSULE ORAL
Status: DISCONTINUED | OUTPATIENT
Start: 2024-02-03 | End: 2024-02-03 | Stop reason: HOSPADM

## 2024-02-02 RX ORDER — PROPOFOL 10 MG/ML
INJECTION, EMULSION INTRAVENOUS AS NEEDED
Status: DISCONTINUED | OUTPATIENT
Start: 2024-02-02 | End: 2024-02-02

## 2024-02-02 RX ORDER — MIDAZOLAM HYDROCHLORIDE 2 MG/2ML
INJECTION, SOLUTION INTRAMUSCULAR; INTRAVENOUS AS NEEDED
Status: DISCONTINUED | OUTPATIENT
Start: 2024-02-02 | End: 2024-02-02

## 2024-02-02 RX ORDER — ATORVASTATIN CALCIUM 40 MG/1
40 TABLET, FILM COATED ORAL DAILY
Status: DISCONTINUED | OUTPATIENT
Start: 2024-02-03 | End: 2024-02-03 | Stop reason: HOSPADM

## 2024-02-02 RX ORDER — METOPROLOL SUCCINATE 50 MG/1
50 TABLET, EXTENDED RELEASE ORAL DAILY
Status: DISCONTINUED | OUTPATIENT
Start: 2024-02-03 | End: 2024-02-03 | Stop reason: HOSPADM

## 2024-02-02 RX ORDER — ACETAMINOPHEN 325 MG/1
650 TABLET ORAL EVERY 4 HOURS PRN
Status: DISCONTINUED | OUTPATIENT
Start: 2024-02-02 | End: 2024-02-03

## 2024-02-02 RX ORDER — FLUTICASONE PROPIONATE 50 MCG
1 SPRAY, SUSPENSION (ML) NASAL DAILY
Status: DISCONTINUED | OUTPATIENT
Start: 2024-02-03 | End: 2024-02-03 | Stop reason: HOSPADM

## 2024-02-02 RX ORDER — ALPRAZOLAM 0.25 MG/1
0.25 TABLET ORAL DAILY PRN
Status: DISCONTINUED | OUTPATIENT
Start: 2024-02-02 | End: 2024-02-03 | Stop reason: HOSPADM

## 2024-02-02 RX ORDER — SODIUM CHLORIDE 9 MG/ML
20 INJECTION, SOLUTION INTRAVENOUS ONCE
Status: COMPLETED | OUTPATIENT
Start: 2024-02-02 | End: 2024-02-02

## 2024-02-02 RX ORDER — ADENOSINE 3 MG/ML
INJECTION INTRAVENOUS CODE/TRAUMA/SEDATION MEDICATION
Status: DISCONTINUED | OUTPATIENT
Start: 2024-02-02 | End: 2024-02-02 | Stop reason: HOSPADM

## 2024-02-02 RX ORDER — CEFAZOLIN SODIUM 1 G/3ML
INJECTION, POWDER, FOR SOLUTION INTRAMUSCULAR; INTRAVENOUS AS NEEDED
Status: DISCONTINUED | OUTPATIENT
Start: 2024-02-02 | End: 2024-02-02

## 2024-02-02 RX ORDER — PANTOPRAZOLE SODIUM 40 MG/10ML
40 INJECTION, POWDER, LYOPHILIZED, FOR SOLUTION INTRAVENOUS ONCE
Status: COMPLETED | OUTPATIENT
Start: 2024-02-02 | End: 2024-02-02

## 2024-02-02 RX ORDER — HEPARIN SODIUM 1000 [USP'U]/ML
INJECTION, SOLUTION INTRAVENOUS; SUBCUTANEOUS CODE/TRAUMA/SEDATION MEDICATION
Status: DISCONTINUED | OUTPATIENT
Start: 2024-02-02 | End: 2024-02-02 | Stop reason: HOSPADM

## 2024-02-02 RX ORDER — SODIUM CHLORIDE 9 MG/ML
INJECTION, SOLUTION INTRAVENOUS CONTINUOUS PRN
Status: DISCONTINUED | OUTPATIENT
Start: 2024-02-02 | End: 2024-02-02

## 2024-02-02 RX ORDER — LIDOCAINE HYDROCHLORIDE 10 MG/ML
INJECTION, SOLUTION EPIDURAL; INFILTRATION; INTRACAUDAL; PERINEURAL AS NEEDED
Status: DISCONTINUED | OUTPATIENT
Start: 2024-02-02 | End: 2024-02-02

## 2024-02-02 RX ORDER — ONDANSETRON 2 MG/ML
4 INJECTION INTRAMUSCULAR; INTRAVENOUS ONCE AS NEEDED
Status: DISCONTINUED | OUTPATIENT
Start: 2024-02-02 | End: 2024-02-02 | Stop reason: HOSPADM

## 2024-02-02 RX ORDER — ALBUMIN, HUMAN INJ 5% 5 %
SOLUTION INTRAVENOUS CONTINUOUS PRN
Status: DISCONTINUED | OUTPATIENT
Start: 2024-02-02 | End: 2024-02-02

## 2024-02-02 RX ORDER — SUCCINYLCHOLINE/SOD CL,ISO/PF 100 MG/5ML
SYRINGE (ML) INTRAVENOUS AS NEEDED
Status: DISCONTINUED | OUTPATIENT
Start: 2024-02-02 | End: 2024-02-02

## 2024-02-02 RX ORDER — HYDROMORPHONE HCL/PF 1 MG/ML
0.5 SYRINGE (ML) INJECTION
Status: DISCONTINUED | OUTPATIENT
Start: 2024-02-02 | End: 2024-02-02 | Stop reason: HOSPADM

## 2024-02-02 RX ORDER — FENTANYL CITRATE 50 UG/ML
INJECTION, SOLUTION INTRAMUSCULAR; INTRAVENOUS AS NEEDED
Status: DISCONTINUED | OUTPATIENT
Start: 2024-02-02 | End: 2024-02-02

## 2024-02-02 RX ADMIN — MIDAZOLAM 2 MG: 1 INJECTION INTRAMUSCULAR; INTRAVENOUS at 13:28

## 2024-02-02 RX ADMIN — PHENYLEPHRINE HYDROCHLORIDE 30 MCG/MIN: 10 INJECTION INTRAVENOUS at 13:41

## 2024-02-02 RX ADMIN — LIDOCAINE HYDROCHLORIDE 50 MG: 10 INJECTION, SOLUTION EPIDURAL; INFILTRATION; INTRACAUDAL; PERINEURAL at 13:35

## 2024-02-02 RX ADMIN — PROPOFOL 200 MG: 10 INJECTION, EMULSION INTRAVENOUS at 13:35

## 2024-02-02 RX ADMIN — PANTOPRAZOLE SODIUM 40 MG: 40 TABLET, DELAYED RELEASE ORAL at 21:07

## 2024-02-02 RX ADMIN — PROPOFOL 50 MG: 10 INJECTION, EMULSION INTRAVENOUS at 13:56

## 2024-02-02 RX ADMIN — SODIUM CHLORIDE: 0.9 INJECTION, SOLUTION INTRAVENOUS at 13:28

## 2024-02-02 RX ADMIN — FENTANYL CITRATE 50 MCG: 50 INJECTION INTRAMUSCULAR; INTRAVENOUS at 18:51

## 2024-02-02 RX ADMIN — NOREPINEPHRINE BITARTRATE 8 MCG: 1 INJECTION, SOLUTION, CONCENTRATE INTRAVENOUS at 14:38

## 2024-02-02 RX ADMIN — ONDANSETRON 4 MG: 2 INJECTION INTRAMUSCULAR; INTRAVENOUS at 18:36

## 2024-02-02 RX ADMIN — NOREPINEPHRINE BITARTRATE 8 MCG: 1 INJECTION, SOLUTION, CONCENTRATE INTRAVENOUS at 14:17

## 2024-02-02 RX ADMIN — ALBUMIN (HUMAN): 12.5 INJECTION, SOLUTION INTRAVENOUS at 17:34

## 2024-02-02 RX ADMIN — NOREPINEPHRINE BITARTRATE 8 MCG: 1 INJECTION, SOLUTION, CONCENTRATE INTRAVENOUS at 16:11

## 2024-02-02 RX ADMIN — NOREPINEPHRINE BITARTRATE 16 MCG: 1 INJECTION, SOLUTION, CONCENTRATE INTRAVENOUS at 16:23

## 2024-02-02 RX ADMIN — NOREPINEPHRINE BITARTRATE 8 MCG: 1 INJECTION, SOLUTION, CONCENTRATE INTRAVENOUS at 14:26

## 2024-02-02 RX ADMIN — CEFAZOLIN 2000 MG: 1 INJECTION, POWDER, FOR SOLUTION INTRAMUSCULAR; INTRAVENOUS at 17:57

## 2024-02-02 RX ADMIN — PANTOPRAZOLE SODIUM 40 MG: 40 INJECTION, POWDER, FOR SOLUTION INTRAVENOUS at 14:11

## 2024-02-02 RX ADMIN — NOREPINEPHRINE BITARTRATE 16 MCG: 1 INJECTION, SOLUTION, CONCENTRATE INTRAVENOUS at 16:59

## 2024-02-02 RX ADMIN — DEXAMETHASONE SODIUM PHOSPHATE 10 MG: 10 INJECTION, SOLUTION INTRAMUSCULAR; INTRAVENOUS at 13:35

## 2024-02-02 RX ADMIN — NOREPINEPHRINE BITARTRATE 8 MCG: 1 INJECTION, SOLUTION, CONCENTRATE INTRAVENOUS at 15:56

## 2024-02-02 RX ADMIN — CEFAZOLIN 2000 MG: 1 INJECTION, POWDER, FOR SOLUTION INTRAMUSCULAR; INTRAVENOUS at 14:00

## 2024-02-02 RX ADMIN — NOREPINEPHRINE BITARTRATE 16 MCG: 1 INJECTION, SOLUTION, CONCENTRATE INTRAVENOUS at 16:18

## 2024-02-02 RX ADMIN — PHENYLEPHRINE HYDROCHLORIDE 200 MCG: 10 INJECTION INTRAVENOUS at 13:44

## 2024-02-02 RX ADMIN — FENTANYL CITRATE 50 MCG: 50 INJECTION INTRAMUSCULAR; INTRAVENOUS at 15:28

## 2024-02-02 RX ADMIN — NOREPINEPHRINE BITARTRATE 8 MCG: 1 INJECTION, SOLUTION, CONCENTRATE INTRAVENOUS at 14:56

## 2024-02-02 RX ADMIN — NOREPINEPHRINE BITARTRATE 8 MCG: 1 INJECTION, SOLUTION, CONCENTRATE INTRAVENOUS at 14:50

## 2024-02-02 RX ADMIN — NOREPINEPHRINE BITARTRATE 8 MCG: 1 INJECTION, SOLUTION, CONCENTRATE INTRAVENOUS at 15:40

## 2024-02-02 RX ADMIN — FENTANYL CITRATE 50 MCG: 50 INJECTION INTRAMUSCULAR; INTRAVENOUS at 13:56

## 2024-02-02 RX ADMIN — Medication 100 MG: at 13:35

## 2024-02-02 RX ADMIN — APIXABAN 5 MG: 5 TABLET, FILM COATED ORAL at 21:08

## 2024-02-02 RX ADMIN — NOREPINEPHRINE BITARTRATE 16 MCG: 1 INJECTION, SOLUTION, CONCENTRATE INTRAVENOUS at 16:32

## 2024-02-02 RX ADMIN — SODIUM CHLORIDE 20 ML/HR: 0.9 INJECTION, SOLUTION INTRAVENOUS at 11:13

## 2024-02-02 RX ADMIN — ALBUMIN (HUMAN): 12.5 INJECTION, SOLUTION INTRAVENOUS at 17:59

## 2024-02-02 RX ADMIN — FENTANYL CITRATE 50 MCG: 50 INJECTION INTRAMUSCULAR; INTRAVENOUS at 13:35

## 2024-02-02 RX ADMIN — SODIUM CHLORIDE: 0.9 INJECTION, SOLUTION INTRAVENOUS at 16:28

## 2024-02-02 RX ADMIN — NOREPINEPHRINE BITARTRATE 16 MCG: 1 INJECTION, SOLUTION, CONCENTRATE INTRAVENOUS at 17:14

## 2024-02-02 NOTE — DISCHARGE SUMMARY
Discharge Summary - Luca Bowling 68 y.o. male MRN: [id number]    Unit/Bed#: -01 Encounter: [id number]      Admission Date: 2/2/2024   Discharge Date: 2/3/2024    Discharge Diagnosis:   Symptomatic atrial flutter/fibrillation  History of right bundle branch block  GERD    Procedures Performed:   Atrial fibrillation/ flutter ablation  Orders Placed This Encounter   Procedures    Cardiac ep lab eps/ablations       Consultants: None    HPI: 68 y.o. year old male with patient with above past medical history presents today for A-fib/flutter ablation with Dr. Tejeda.  He was originally seen in April 2022 complaining of palpitations and a Biotel monitor demonstrated SVT and A-fib.  At that time his CHADS2 Vascor was only 1 and he was started on aspirin as well as metoprolol.  He was seen in consultation by Dr. Tejeda in September 2022 to discuss options for A-fib management.  At that time he only wished to continue on his metoprolol and avoid invasive procedures.  In December 2023 patient Ramón presented to his cardiologist with complaints of increased palpitations at that time he discussed his options with the cardiologist and he wished to proceed with ablation at that time. .     Hospital Course: Luca Bowling presented at his baseline state of health. After the procedure was explained in detail and consent was obtained, he underwent ablation without complications. He tolerated the procedure well. He was then monitored overnight for further observation.    There were no acute issues or events overnight. The following morning He denied all cardiac complaints, including chest pain/pressure, dyspnea, palpitations, dizziness, lightheadedness, or syncope. His vital signs were reviewed and labs are stable. Telemetry showed sinus rhythm. His groins were soft without significant hematoma or recurrent bleeding. He reported some blood tinged sputum this morning when bringing up mucus, of note he did have a traumatic NANCY  insertion during the case yesterday.       Physical exam on the day of discharge was as follows:  Physical Exam  Vitals reviewed.   Constitutional:       General: He is not in acute distress.     Appearance: He is not ill-appearing.   HENT:      Head: Normocephalic.      Mouth/Throat:      Mouth: Mucous membranes are moist.   Cardiovascular:      Rate and Rhythm: Normal rate and regular rhythm.      Heart sounds: No murmur heard.     No friction rub. No gallop.   Pulmonary:      Effort: Pulmonary effort is normal. No respiratory distress.      Breath sounds: No wheezing or rales.   Abdominal:      Comments: Groin sites C/D/I without evidence of hematoma    Skin:     General: Skin is warm and dry.   Neurological:      Mental Status: He is alert.           He was given routine post ablation discharge instructions and restrictions, and these were explained in detail. He was given a follow up appointment for 4-6 week follow up, and he was instructed to follow up with his primary cardiologist as previously instructed.    In terms of his medications, he will start eliquis 5mg twice daily and continue on his protonix for at least the next 30 days.  He will hold his aspirin while he is on Eliquis. He will also take flecainide 50mg twice daily for 3 months.     He is stable for discharge at this time with all questions answered. He was discussed in detail with Dr. Barnes who is in agreement with this discharge summary.     Discharge Medications:  See after visit summary for reconciled discharge medications provided to patient and family.    Medications Prior to Admission   Medication    ALPRAZolam (XANAX) 0.25 mg tablet    Aspirin Low Dose 81 MG EC tablet    atorvastatin (LIPITOR) 40 mg tablet    fluticasone (FLONASE) 50 mcg/act nasal spray    metoprolol succinate (TOPROL-XL) 50 mg 24 hr tablet    pantoprazole (PROTONIX) 40 mg tablet    Probiotic Product (Align) 4 MG CAPS    Silodosin 8 MG CAPS         Pertininet  Labs/diagnostics:  CBC with diff:   Results from last 7 days   Lab Units 02/03/24  0206 02/02/24  1109   WBC Thousand/uL 16.00* 13.43*   HEMOGLOBIN g/dL 15.0 17.5*   HEMATOCRIT % 46.2 51.0*   MCV fL 93 89   PLATELETS Thousands/uL 205 233   RBC Million/uL 4.96 5.74*   MCH pg 30.2 30.5   MCHC g/dL 32.5 34.3   RDW % 12.4 12.2   MPV fL 9.9 9.9   NRBC AUTO /100 WBCs  --  0       BMP:  Results from last 7 days   Lab Units 02/03/24  0206 02/02/24  1109   POTASSIUM mmol/L 4.3 4.5   CHLORIDE mmol/L 105 108   CO2 mmol/L 23 27   BUN mg/dL 15 13   CREATININE mg/dL 1.07 1.01   CALCIUM mg/dL 8.5 9.3       Magnesium:   Results from last 7 days   Lab Units 02/02/24  1109   MAGNESIUM mg/dL 2.1       Coags:   Results from last 7 days   Lab Units 02/03/24  0206 02/02/24  1109   INR  1.18 1.02         Complications: none    Condition at Discharge: good     Discharge instructions/Information to patient and family:   See after visit summary for information provided to patient and family.      Provisions for Follow-Up Care:  See after visit summary for information related to follow-up care and any pertinent home health orders.      Disposition: Home    Planned Readmission: No    Discharge Statement   I spent 45 minutes minutes discharging the patient. This time was spent on the day of discharge. I had direct contact with the patient on the day of discharge. Additional documentation is required if more than 30 minutes were spent on discharge. Evaluating the incision, discussing discharge instructions and restrictions, arranging follow up appointments, discussing medications

## 2024-02-02 NOTE — ANESTHESIA PREPROCEDURE EVALUATION
Procedure:  Cardiac eps/afib ablation (Chest)  Cardiac eps/aflutter ablation (Chest)    Relevant Problems   CARDIO   (+) Atrial fibrillation (HCC)   (+) Atrial flutter (HCC)   (+) Hyperlipidemia   (+) PAC (premature atrial contraction)      GI/HEPATIC   (+) Gastroesophageal reflux disease without esophagitis      /RENAL   (+) BPH with obstruction/lower urinary tract symptoms      NEURO/PSYCH   (+) Anxiety      Other   (+) Lymphadenopathy, axillary   (+) Memory difficulty   (+) Memory loss      Interpretation Summary     9/2022    Left Ventricle: Left ventricular cavity size is normal. Wall thickness is mildly increased. There is mild concentric hypertrophy. Systolic function is normal (65%). Wall motion is normal. Diastolic function is mildly abnormal, consistent with grade I (abnormal) relaxation.    Right Ventricle: Right ventricular cavity size is normal. Systolic function is normal.    Mitral Valve: There is trace regurgitation.    Tricuspid Valve: There is trace regurgitation. The right ventricular systolic pressure is normal.     Physical Exam    Airway    Mallampati score: II  TM Distance: >3 FB  Neck ROM: full     Dental       Cardiovascular      Pulmonary      Other Findings        Anesthesia Plan  ASA Score- 3     Anesthesia Type- general with ASA Monitors.         Additional Monitors:     Airway Plan: ETT.           Plan Factors-    Chart reviewed.                      Induction- intravenous.    Postoperative Plan-     Informed Consent- Anesthetic plan and risks discussed with patient.  I personally reviewed this patient with the CRNA. Discussed and agreed on the Anesthesia Plan with the CRNA..

## 2024-02-02 NOTE — H&P
[facility]  H&P: Electrophysiology  Date of Service: 2024  Hospital Day: 0  Name: Luca Bowling I  MRN: [id number]  Unit/Bed#: BE CATH LAB ROOM      Assessment/Plan   Symptomatic atrial fibrillation/flutter  Diagnosed 2022 on Biotel monitor 2/2 palpitations   [id number] equals 1 (age)  Rate controlled-Toprol-XL 50 mg daily  Hx of RBBB  GERD  BPH    Plan for A-fib/flutter ablation with Dr. Tejeda today.       History of Present Illness   HPI: Luca Bowling is a 68 y.o. year old male with patient with above past medical history presents today for A-fib/flutter ablation with Dr. Tejeda.  He was originally seen in 2022 complaining of palpitations and a Biotel monitor demonstrated SVT and A-fib.  At that time his CHADS2 Vascor was only 1 and he was started on aspirin as well as metoprolol.  He was seen in consultation by Dr. Tejeda in 2022 to discuss options for A-fib management.  At that time he only wished to continue on his metoprolol and avoid invasive procedures.  In 2023 patient Ramón presented to his cardiologist with complaints of increased palpitations at that time he discussed his options with the cardiologist and he wished to proceed with ablation at that time.    Primary Cardiologist: Dr. Mueller    Cardiac testin2022 stress test- neg    Historical Information   Past Medical History:  2021: Abnormal ECG      Comment:  Had fluttering of my heart  No date: Basal cell carcinoma  No date: Colon polyp  No date: GERD (gastroesophageal reflux disease)  No date: Hyperlipidemia  No date: Seasonal allergies  2021: Squamous cell skin cancer      Comment:  Left neck SCCIS Past Surgical History:  No date: CHOLECYSTECTOMY  10/4/2017: COLONOSCOPY; N/A      Comment:  Procedure: COLONOSCOPY;  Surgeon: Richie Panda MD;               Location: [facility];  Service: Gastroenterology  No date: EAR SURGERY      Comment:  titanium in  ear (stapes bone replaced)  2021: MOHS SURGERY      Comment:  left neck   2018: [place] ESOPHAGOGASTRODUODENOSCOPY TRANSORAL DIAGNOSTIC; N/A      Comment:  Procedure: ESOPHAGOGASTRODUODENOSCOPY (EGD);  Surgeon:                Richie Panda MD;  Location: [facility];  Service:                Gastroenterology  No date: SINUS SURGERY  No date: SKIN BIOPSY   Current Outpatient Medications   Medication Instructions    ALPRAZolam (XANAX) 0.25 mg, Oral, Daily PRN    Aspirin Low Dose 81 mg, Oral, Daily    atorvastatin (LIPITOR) 40 mg tablet TAKE 1 TABLET DAILY    fluticasone (FLONASE) 50 mcg/act nasal spray SQUIRT 1 SPRAY INTO EACH NOSTRIL DAILY AS NEEDED    metoprolol succinate (TOPROL-XL) 50 mg 24 hr tablet TAKE 1 TABLET DAILY    pantoprazole (PROTONIX) 40 mg tablet TAKE 1 TABLET TWICE A DAY    Probiotic Product (Align) 4 MG CAPS No dose, route, or frequency recorded.    Silodosin 8 MG CAPS 1 capsule, Oral, Daily at bedtime    No Known Allergies   Social History     Tobacco Use    Smoking status: Former     Current packs/day: 0.00     Average packs/day: 1 pack/day for 3.0 years (3.0 ttl pk-yrs)     Types: Cigarettes     Start date: 1970     Quit date: 1973     Years since quittin.1    Smokeless tobacco: Never   Vaping Use    Vaping status: Never Used   Substance Use Topics    Alcohol use: Yes     Alcohol/week: 3.0 standard drinks of alcohol     Types: 3 Glasses of wine per week     Comment: Occ wine    Drug use: No    Family History   Problem Relation Age of Onset    Colon cancer Mother     Other Mother         septicemia    Hypertension Father     Heart attack Father         NSTEMI    Hypertension Sister     Squamous cell carcinoma Sister     Hypertension Brother         Has defibulator and pace maker    Cancer Brother     Basal cell carcinoma Brother             Objective                            Vitals I/O    Most Recent Min/Max in 24hrs   Temp 98.5 °F (36.9 °C) Temp  Min: 98.5 °F (36.9 °C)  Max:  98.5 °F (36.9 °C)   Pulse 84 Pulse  Min: 84  Max: 84   Resp 16 Resp  Min: 16  Max: 16   /88 BP  Min: 141/88  Max: 141/88   O2 Sat 95 % SpO2  Min: 95 %  Max: 95 %    No intake or output data in the 24 hours ending 02/02/24 1225                     Physical Exam   Physical Exam  Vitals reviewed.   Constitutional:       General: He is not in acute distress.     Appearance: He is well-developed.   HENT:      Head: Normocephalic and atraumatic.   Eyes:      Pupils: Pupils are equal, round, and reactive to light.   Neck:      Vascular: No JVD.      Trachea: No tracheal deviation.   Cardiovascular:      Rate and Rhythm: Normal rate and regular rhythm.      Heart sounds: Normal heart sounds. No murmur heard.     No friction rub. No gallop.   Pulmonary:      Effort: Pulmonary effort is normal. No respiratory distress.      Breath sounds: Normal breath sounds. No wheezing or rales.   Chest:      Chest wall: No tenderness.   Skin:     General: Skin is warm and dry.   Neurological:      Mental Status: He is alert.            Labs:    CBC  Recent Labs     02/02/24  1109   WBC 13.43*   HGB 17.5*   HCT 51.0*        BMP  Recent Labs     02/02/24  1109   SODIUM 139   K 4.5      CO2 27   AGAP 4   BUN 13   CREATININE 1.01   CALCIUM 9.3       Coags  Recent Labs     02/02/24  1109   INR 1.02        Additional Electrolytes  Recent Labs     02/02/24  1109   MG 2.1          Blood Gas  No recent results  No recent results LFTs  No recent results    Infectious  No recent results  Glucose  Recent Labs     02/02/24  1109   GLUC 145*           SIGNATURE: Cami Schmid PA-C

## 2024-02-02 NOTE — DISCHARGE INSTR - AVS FIRST PAGE
NEW MEDICATIONS:  Please start taking Protonix 40 mg once daily for 30 days and Eliquis 5mg twice daily in the post-ablation setting.  Take Flecanide 50mg twice daily for the next 3 months.   Please discontinue Aspirin while on Eliquis    RESTRICTIONS:   No heavy lifting or strenuous activity for one week.    Do not submerge in any water for one week or until groin heals. You may shower. Please let soap and water run over the groins. Do not scrub the groins. Pat the area dry. You may place band-aid on groins daily for no more than 5 days.    If you notice bleeding, swelling, or large firm lumps near ablation incision, please contact Dr. Tejeda's office at [phone].

## 2024-02-03 VITALS
SYSTOLIC BLOOD PRESSURE: 134 MMHG | OXYGEN SATURATION: 92 % | TEMPERATURE: 97.8 F | RESPIRATION RATE: 16 BRPM | DIASTOLIC BLOOD PRESSURE: 68 MMHG | BODY MASS INDEX: 32.93 KG/M2 | HEIGHT: 70 IN | HEART RATE: 72 BPM | WEIGHT: 230 LBS

## 2024-02-03 LAB
ANION GAP SERPL CALCULATED.3IONS-SCNC: 9 MMOL/L
BUN SERPL-MCNC: 15 MG/DL (ref 5–25)
CALCIUM SERPL-MCNC: 8.5 MG/DL (ref 8.4–10.2)
CHLORIDE SERPL-SCNC: 105 MMOL/L (ref 96–108)
CO2 SERPL-SCNC: 23 MMOL/L (ref 21–32)
CREAT SERPL-MCNC: 1.07 MG/DL (ref 0.6–1.3)
ERYTHROCYTE [DISTWIDTH] IN BLOOD BY AUTOMATED COUNT: 12.4 % (ref 11.6–15.1)
GFR SERPL CREATININE-BSD FRML MDRD: 71 ML/MIN/1.73SQ M
GLUCOSE SERPL-MCNC: 228 MG/DL (ref 65–140)
HCT VFR BLD AUTO: 46.2 % (ref 36.5–49.3)
HGB BLD-MCNC: 15 G/DL (ref 12–17)
INR PPP: 1.18 (ref 0.84–1.19)
MCH RBC QN AUTO: 30.2 PG (ref 26.8–34.3)
MCHC RBC AUTO-ENTMCNC: 32.5 G/DL (ref 31.4–37.4)
MCV RBC AUTO: 93 FL (ref 82–98)
PLATELET # BLD AUTO: 205 THOUSANDS/UL (ref 149–390)
PMV BLD AUTO: 9.9 FL (ref 8.9–12.7)
POTASSIUM SERPL-SCNC: 4.3 MMOL/L (ref 3.5–5.3)
PROTHROMBIN TIME: 14.9 SECONDS (ref 11.6–14.5)
RBC # BLD AUTO: 4.96 MILLION/UL (ref 3.88–5.62)
SODIUM SERPL-SCNC: 137 MMOL/L (ref 135–147)
WBC # BLD AUTO: 16 THOUSAND/UL (ref 4.31–10.16)

## 2024-02-03 PROCEDURE — 85610 PROTHROMBIN TIME: CPT | Performed by: INTERNAL MEDICINE

## 2024-02-03 PROCEDURE — 80048 BASIC METABOLIC PNL TOTAL CA: CPT | Performed by: INTERNAL MEDICINE

## 2024-02-03 PROCEDURE — NC001 PR NO CHARGE: Performed by: PHYSICIAN ASSISTANT

## 2024-02-03 PROCEDURE — 85027 COMPLETE CBC AUTOMATED: CPT | Performed by: INTERNAL MEDICINE

## 2024-02-03 RX ORDER — ACETAMINOPHEN 325 MG/1
975 TABLET ORAL EVERY 6 HOURS PRN
Start: 2024-02-03

## 2024-02-03 RX ORDER — FLECAINIDE ACETATE 50 MG/1
50 TABLET ORAL EVERY 12 HOURS SCHEDULED
Status: DISCONTINUED | OUTPATIENT
Start: 2024-02-03 | End: 2024-02-03 | Stop reason: HOSPADM

## 2024-02-03 RX ORDER — FLECAINIDE ACETATE 50 MG/1
50 TABLET ORAL EVERY 12 HOURS SCHEDULED
Qty: 180 TABLET | Refills: 0 | Status: SHIPPED | OUTPATIENT
Start: 2024-02-03

## 2024-02-03 RX ORDER — COLCHICINE 0.6 MG/1
0.6 TABLET ORAL DAILY
Qty: 4 TABLET | Refills: 0 | Status: SHIPPED | OUTPATIENT
Start: 2024-02-03

## 2024-02-03 RX ORDER — ACETAMINOPHEN 325 MG/1
975 TABLET ORAL EVERY 4 HOURS PRN
Status: DISCONTINUED | OUTPATIENT
Start: 2024-02-03 | End: 2024-02-03 | Stop reason: HOSPADM

## 2024-02-03 RX ADMIN — ATORVASTATIN CALCIUM 40 MG: 40 TABLET, FILM COATED ORAL at 09:28

## 2024-02-03 RX ADMIN — ACETAMINOPHEN 975 MG: 325 TABLET, FILM COATED ORAL at 09:27

## 2024-02-03 RX ADMIN — APIXABAN 5 MG: 5 TABLET, FILM COATED ORAL at 09:28

## 2024-02-03 RX ADMIN — FLECAINIDE ACETATE 50 MG: 50 TABLET ORAL at 11:36

## 2024-02-03 RX ADMIN — METOPROLOL SUCCINATE 50 MG: 50 TABLET, EXTENDED RELEASE ORAL at 09:28

## 2024-02-03 RX ADMIN — PANTOPRAZOLE SODIUM 40 MG: 40 TABLET, DELAYED RELEASE ORAL at 09:28

## 2024-02-03 NOTE — PLAN OF CARE
Problem: PAIN - ADULT  Goal: Verbalizes/displays adequate comfort level or baseline comfort level  Description: Interventions:  - Encourage patient to monitor pain and request assistance  - Assess pain using appropriate pain scale  - Administer analgesics based on type and severity of pain and evaluate response  - Implement non-pharmacological measures as appropriate and evaluate response  - Consider cultural and social influences on pain and pain management  - Notify physician/advanced practitioner if interventions unsuccessful or patient reports new pain  2/3/2024 1250 by Luanne Block RN  Outcome: Adequate for Discharge  2/3/2024 1250 by Luanne Block RN  Outcome: Adequate for Discharge     Problem: INFECTION - ADULT  Goal: Absence or prevention of progression during hospitalization  Description: INTERVENTIONS:  - Assess and monitor for signs and symptoms of infection  - Monitor lab/diagnostic results  - Monitor all insertion sites, i.e. indwelling lines, tubes, and drains  - Monitor endotracheal if appropriate and nasal secretions for changes in amount and color  - [place] appropriate cooling/warming therapies per order  - Administer medications as ordered  - Instruct and encourage patient and family to use good hand hygiene technique  - Identify and instruct in appropriate isolation precautions for identified infection/condition  2/3/2024 1250 by Luanne Block RN  Outcome: Adequate for Discharge  2/3/2024 1250 by Luanne Block RN  Outcome: Adequate for Discharge  Goal: Absence of fever/infection during neutropenic period  Description: INTERVENTIONS:  - Monitor WBC    2/3/2024 1250 by Luanne Block RN  Outcome: Adequate for Discharge  2/3/2024 1250 by Luanne Block RN  Outcome: Adequate for Discharge     Problem: SAFETY ADULT  Goal: Patient will remain free of falls  Description: INTERVENTIONS:  - Educate patient/family on patient safety including physical limitations  - Instruct patient to call  for assistance with activity   - Consult OT/PT to assist with strengthening/mobility   - Keep Call bell within reach  - Keep bed low and locked with side rails adjusted as appropriate  - Keep care items and personal belongings within reach  - Initiate and maintain comfort rounds  - Make Fall Risk Sign visible to staff  - Offer Toileting every  Hours, in advance of need  - Initiate/Maintain alarm  - Obtain necessary fall risk management equipment:   - Apply yellow socks and bracelet for high fall risk patients  - Consider moving patient to room near nurses station  2/3/2024 1250 by Luanne Block RN  Outcome: Adequate for Discharge  2/3/2024 1250 by Luanne Block RN  Outcome: Adequate for Discharge  Goal: Maintain or return to baseline ADL function  Description: INTERVENTIONS:  -  Assess patient's ability to carry out ADLs; assess patient's baseline for ADL function and identify physical deficits which impact ability to perform ADLs (bathing, care of mouth/teeth, toileting, grooming, dressing, etc.)  - Assess/evaluate cause of self-care deficits   - Assess range of motion  - Assess patient's mobility; develop plan if impaired  - Assess patient's need for assistive devices and provide as appropriate  - Encourage maximum independence but intervene and supervise when necessary  - Involve family in performance of ADLs  - Assess for home care needs following discharge   - Consider OT consult to assist with ADL evaluation and planning for discharge  - Provide patient education as appropriate  2/3/2024 1250 by Luanne Block RN  Outcome: Adequate for Discharge  2/3/2024 1250 by Luanne Block RN  Outcome: Adequate for Discharge  Goal: Maintains/Returns to pre admission functional level  Description: INTERVENTIONS:  - Perform AM-PAC 6 Click Basic Mobility/ Daily Activity assessment daily.  - Set and communicate daily mobility goal to care team and patient/family/caregiver.   - Collaborate with rehabilitation services on  mobility goals if consulted  - Perform Range of Motion  times a day.  - Reposition patient every  hours.  - Dangle patient  times a day  - Stand patient  times a day  - Ambulate patient  times a day  - Out of bed to chair  times a day   - Out of bed for meals times a day  - Out of bed for toileting  - Record patient progress and toleration of activity level   2/3/2024 1250 by Luanne Block RN  Outcome: Adequate for Discharge  2/3/2024 1250 by Luanne Block RN  Outcome: Adequate for Discharge     Problem: DISCHARGE PLANNING  Goal: Discharge to home or other facility with appropriate resources  Description: INTERVENTIONS:  - Identify barriers to discharge w/patient and caregiver  - Arrange for needed discharge resources and transportation as appropriate  - Identify discharge learning needs (meds, wound care, etc.)  - Arrange for interpretive services to assist at discharge as needed  - Refer to Case Management Department for coordinating discharge planning if the patient needs post-hospital services based on physician/advanced practitioner order or complex needs related to functional status, cognitive ability, or social support system  2/3/2024 1250 by Luanne Block RN  Outcome: Adequate for Discharge  2/3/2024 1250 by Luanne Block RN  Outcome: Adequate for Discharge     Problem: Knowledge Deficit  Goal: Patient/family/caregiver demonstrates understanding of disease process, treatment plan, medications, and discharge instructions  Description: Complete learning assessment and assess knowledge base.  Interventions:  - Provide teaching at level of understanding  - Provide teaching via preferred learning methods  2/3/2024 1250 by Luanne Block RN  Outcome: Adequate for Discharge  2/3/2024 1250 by Luanne Block RN  Outcome: Adequate for Discharge

## 2024-02-03 NOTE — PLAN OF CARE
Problem: PAIN - ADULT  Goal: Verbalizes/displays adequate comfort level or baseline comfort level  Description: Interventions:  - Encourage patient to monitor pain and request assistance  - Assess pain using appropriate pain scale  - Administer analgesics based on type and severity of pain and evaluate response  - Implement non-pharmacological measures as appropriate and evaluate response  - Consider cultural and social influences on pain and pain management  - Notify physician/advanced practitioner if interventions unsuccessful or patient reports new pain  Outcome: Progressing     Problem: INFECTION - ADULT  Goal: Absence or prevention of progression during hospitalization  Description: INTERVENTIONS:  - Assess and monitor for signs and symptoms of infection  - Monitor lab/diagnostic results  - Monitor all insertion sites, i.e. indwelling lines, tubes, and drains  - Monitor endotracheal if appropriate and nasal secretions for changes in amount and color  - [place] appropriate cooling/warming therapies per order  - Administer medications as ordered  - Instruct and encourage patient and family to use good hand hygiene technique  - Identify and instruct in appropriate isolation precautions for identified infection/condition  Outcome: Progressing  Goal: Absence of fever/infection during neutropenic period  Description: INTERVENTIONS:  - Monitor WBC    Outcome: Progressing     Problem: SAFETY ADULT  Goal: Patient will remain free of falls  Description: INTERVENTIONS:  - Educate patient/family on patient safety including physical limitations  - Instruct patient to call for assistance with activity   - Consult OT/PT to assist with strengthening/mobility   - Keep Call bell within reach  - Keep bed low and locked with side rails adjusted as appropriate  - Keep care items and personal belongings within reach  - Initiate and maintain comfort rounds  - Make Fall Risk Sign visible to staff  - Offer Toileting every  Hours,  in advance of need  - Initiate/Maintain alarm  - Obtain necessary fall risk management equipment:   - Apply yellow socks and bracelet for high fall risk patients  - Consider moving patient to room near nurses station  Outcome: Progressing  Goal: Maintain or return to baseline ADL function  Description: INTERVENTIONS:  -  Assess patient's ability to carry out ADLs; assess patient's baseline for ADL function and identify physical deficits which impact ability to perform ADLs (bathing, care of mouth/teeth, toileting, grooming, dressing, etc.)  - Assess/evaluate cause of self-care deficits   - Assess range of motion  - Assess patient's mobility; develop plan if impaired  - Assess patient's need for assistive devices and provide as appropriate  - Encourage maximum independence but intervene and supervise when necessary  - Involve family in performance of ADLs  - Assess for home care needs following discharge   - Consider OT consult to assist with ADL evaluation and planning for discharge  - Provide patient education as appropriate  Outcome: Progressing  Goal: Maintains/Returns to pre admission functional level  Description: INTERVENTIONS:  - Perform AM-PAC 6 Click Basic Mobility/ Daily Activity assessment daily.  - Set and communicate daily mobility goal to care team and patient/family/caregiver.   - Collaborate with rehabilitation services on mobility goals if consulted  - Perform Range of Motion  times a day.  - Reposition patient every  hours.  - Dangle patient  times a day  - Stand patient times a day  - Ambulate patient  times a day  - Out of bed to chair  times a day   - Out of bed for meals times a day  - Out of bed for toileting  - Record patient progress and toleration of activity level   Outcome: Progressing     Problem: DISCHARGE PLANNING  Goal: Discharge to home or other facility with appropriate resources  Description: INTERVENTIONS:  - Identify barriers to discharge w/patient and caregiver  - Arrange for  needed discharge resources and transportation as appropriate  - Identify discharge learning needs (meds, wound care, etc.)  - Arrange for interpretive services to assist at discharge as needed  - Refer to Case Management Department for coordinating discharge planning if the patient needs post-hospital services based on physician/advanced practitioner order or complex needs related to functional status, cognitive ability, or social support system  Outcome: Progressing     Problem: Knowledge Deficit  Goal: Patient/family/caregiver demonstrates understanding of disease process, treatment plan, medications, and discharge instructions  Description: Complete learning assessment and assess knowledge base.  Interventions:  - Provide teaching at level of understanding  - Provide teaching via preferred learning methods  Outcome: Progressing

## 2024-02-03 NOTE — QUICK NOTE
NANCY with no left atrial appendage clot.  LV function normal, normal left and right atrial size, minimal mitral regurgitation.  Had to use visual GlideScope to insert NANCY probe and there was some minimal oropharyngeal bleeding that resolved after the case.    CTI line flutter ablation, cryoablation pulmonary veins with needing RF ablation of posterior wall.  There was isolation of the pulmonary veins and block on the flutter line.    Signed out to cardiology fellow overnight to see the patient around 730.    Any issues please reach out to me or Dr. Tejeda.     Reg Graham MD

## 2024-02-03 NOTE — ANESTHESIA POSTPROCEDURE EVALUATION
Post-Op Assessment Note    CV Status:  Stable  Pain Score: 0    Pain management: adequate       Mental Status:  Awake   Hydration Status:  Stable   PONV Controlled:  Controlled   Airway Patency:  Patent     Post Op Vitals Reviewed: Yes    No anethesia notable event occurred.    Staff: Anesthesiologist, CRNA               BP      Temp      Pulse     Resp      SpO2

## 2024-02-03 NOTE — NURSING NOTE
Pt reports feeling better.  Thinks he was having anxiety.  VSS.  D/C instructions reviewed with pt and spouse.  All questions answered. Belongings gathered  Pca accompanied pt down to lobby and assisted into vehicle. Pt stable upon discharge.

## 2024-02-04 LAB
ATRIAL RATE: 77 BPM
P AXIS: 61 DEGREES
PR INTERVAL: 138 MS
QRS AXIS: -41 DEGREES
QRSD INTERVAL: 150 MS
QT INTERVAL: 413 MS
QTC INTERVAL: 468 MS
T WAVE AXIS: -17 DEGREES
VENTRICULAR RATE: 77 BPM

## 2024-02-04 PROCEDURE — 93320 DOPPLER ECHO COMPLETE: CPT | Performed by: INTERNAL MEDICINE

## 2024-02-04 PROCEDURE — 93312 ECHO TRANSESOPHAGEAL: CPT | Performed by: INTERNAL MEDICINE

## 2024-02-04 PROCEDURE — 93325 DOPPLER ECHO COLOR FLOW MAPG: CPT | Performed by: INTERNAL MEDICINE

## 2024-02-15 ENCOUNTER — TELEPHONE (OUTPATIENT)
Dept: CARDIOLOGY CLINIC | Facility: CLINIC | Age: 69
End: 2024-02-15

## 2024-02-15 NOTE — TELEPHONE ENCOUNTER
Pt had an ablation done on 2/2/24 with c/o sore throat, runny nose and sneezing. He would like to know if he can take Claritin or Flonase for his symptoms

## 2024-02-16 ENCOUNTER — OFFICE VISIT (OUTPATIENT)
Dept: FAMILY MEDICINE CLINIC | Facility: CLINIC | Age: 69
End: 2024-02-16
Payer: MEDICARE

## 2024-02-16 VITALS
TEMPERATURE: 98.7 F | OXYGEN SATURATION: 97 % | SYSTOLIC BLOOD PRESSURE: 132 MMHG | BODY MASS INDEX: 32.66 KG/M2 | DIASTOLIC BLOOD PRESSURE: 84 MMHG | WEIGHT: 227.6 LBS | HEART RATE: 87 BPM

## 2024-02-16 DIAGNOSIS — J01.90 ACUTE NON-RECURRENT SINUSITIS, UNSPECIFIED LOCATION: Primary | ICD-10-CM

## 2024-02-16 DIAGNOSIS — R05.2 SUBACUTE COUGH: ICD-10-CM

## 2024-02-16 LAB
SARS-COV-2 AG UPPER RESP QL IA: NEGATIVE
SL AMB POCT RAPID FLU A: NEGATIVE
SL AMB POCT RAPID FLU B: NEGATIVE
VALID CONTROL: NORMAL

## 2024-02-16 PROCEDURE — 87811 SARS-COV-2 COVID19 W/OPTIC: CPT

## 2024-02-16 PROCEDURE — 87804 INFLUENZA ASSAY W/OPTIC: CPT

## 2024-02-16 PROCEDURE — 99213 OFFICE O/P EST LOW 20 MIN: CPT

## 2024-02-16 RX ORDER — AMOXICILLIN AND CLAVULANATE POTASSIUM 875; 125 MG/1; MG/1
1 TABLET, FILM COATED ORAL EVERY 12 HOURS SCHEDULED
Qty: 14 TABLET | Refills: 0 | Status: SHIPPED | OUTPATIENT
Start: 2024-02-16 | End: 2024-02-23

## 2024-02-16 NOTE — PROGRESS NOTES
Name: Luca Bowling      : 1955      MRN: [id number]  Encounter Provider: Nataliya Correa PA-C  Encounter Date: 2024   Encounter department: [facility]    Assessment & Plan     1. Acute non-recurrent sinusitis, unspecified location  -     amoxicillin-clavulanate (AUGMENTIN) 875-125 mg per tablet; Take 1 tablet by mouth every 12 (twelve) hours for 7 days    2. Subacute cough  -     POCT rapid flu A and B  -     POCT Rapid Covid Ag    Patient presents for evaluation of sinus pressure/congestion, sore throat, cough x 5 days. Rapid flu and covid negative at today's visit. Physical exam today revealed tenderness to bilateral maxillary sinuses; however lungs clear b/l with an O2 of 97% on room air. Given exam findings and patient's symptoms will treat for sinusitis with 7 day course of Augmentin. Discussed potential side effects of the abx in depth with patient. Otherwise continue supportive care and make sure to stay hydrated and rest. To call if symptoms worsen/persist despite treatment. Advised ER if symptoms worsened or if he developed any chest pain, shortness of breath, or trouble breathing.        Subjective      CC: cough, sore throat, congestion, sinus pressure x 5 days     Patient presents for evaluation of cough, sore throat, sinus congestion, congestion x 5 days. Has been taking Flonase and Claritin which has been minimally helping. Eating and drinking without any issue. Feels a lot of pressure in his sinuses.     Of note, patient had recent cardiac ablation on ; he is currently on Eliquis and flecanide which he has been doing well on. Notes he checked with his cardiologist before using the flonase and claritin and they told him it was okay. Has been recovering well since the ablation. No current cardiac symptoms.       Review of Systems   Constitutional:  Negative for appetite change, chills, diaphoresis, fatigue and fever.   HENT:  Positive for congestion, rhinorrhea,  sinus pressure, sinus pain, sneezing and sore throat. Negative for ear pain.    Respiratory:  Positive for cough. Negative for chest tightness, shortness of breath and wheezing.    Cardiovascular:  Negative for chest pain and palpitations.   Gastrointestinal:  Negative for abdominal pain, blood in stool, diarrhea, nausea and vomiting.   Neurological:  Negative for dizziness, light-headedness and headaches.       Current Outpatient Medications on File Prior to Visit   Medication Sig    acetaminophen (TYLENOL) 325 mg tablet Take 3 tablets (975 mg total) by mouth every 6 (six) hours as needed for mild pain    ALPRAZolam (XANAX) 0.25 mg tablet Take 1 tablet (0.25 mg total) by mouth daily as needed for anxiety    apixaban (ELIQUIS) 5 mg Take 1 tablet (5 mg total) by mouth 2 (two) times a day    atorvastatin (LIPITOR) 40 mg tablet TAKE 1 TABLET DAILY    flecainide (TAMBOCOR) 50 mg tablet Take 1 tablet (50 mg total) by mouth every 12 (twelve) hours    fluticasone (FLONASE) 50 mcg/act nasal spray SQUIRT 1 SPRAY INTO EACH NOSTRIL DAILY AS NEEDED    metoprolol succinate (TOPROL-XL) 50 mg 24 hr tablet TAKE 1 TABLET DAILY    pantoprazole (PROTONIX) 40 mg tablet TAKE 1 TABLET TWICE A DAY    Probiotic Product (Align) 4 MG CAPS     Silodosin 8 MG CAPS TAKE 1 CAPSULE BY MOUTH EVERYDAY AT BEDTIME    colchicine (COLCRYS) 0.6 mg tablet Take 1 tablet (0.6 mg total) by mouth daily (Patient not taking: Reported on 2/16/2024)       Objective     /84   Pulse 87   Temp 98.7 °F (37.1 °C)   Wt 103 kg (227 lb 9.6 oz)   SpO2 97%   BMI 32.66 kg/m²     Physical Exam  Vitals reviewed.   Constitutional:       General: He is not in acute distress.     Appearance: Normal appearance. He is not ill-appearing or diaphoretic.   HENT:      Head: Normocephalic and atraumatic.      Right Ear: Tympanic membrane, ear canal and external ear normal. There is no impacted cerumen.      Left Ear: Tympanic membrane, ear canal and external ear normal.  There is no impacted cerumen.      Nose: Congestion present. No rhinorrhea.      Right Sinus: Maxillary sinus tenderness present. No frontal sinus tenderness.      Left Sinus: Maxillary sinus tenderness present. No frontal sinus tenderness.      Mouth/Throat:      Mouth: Mucous membranes are moist.      Pharynx: Oropharynx is clear. Posterior oropharyngeal erythema present. No oropharyngeal exudate.   Eyes:      General:         Right eye: No discharge.         Left eye: No discharge.      Conjunctiva/sclera: Conjunctivae normal.   Cardiovascular:      Rate and Rhythm: Normal rate and regular rhythm.      Pulses: Normal pulses.      Heart sounds: Normal heart sounds. No murmur heard.  Pulmonary:      Effort: Pulmonary effort is normal. No respiratory distress.      Breath sounds: Normal breath sounds. No wheezing, rhonchi or rales.   Musculoskeletal:         General: Normal range of motion.      Cervical back: Normal range of motion and neck supple.      Right lower leg: No edema.      Left lower leg: No edema.   Lymphadenopathy:      Cervical: No cervical adenopathy.   Skin:     General: Skin is warm.   Neurological:      General: No focal deficit present.      Mental Status: He is alert.      Gait: Gait normal.   Psychiatric:         Mood and Affect: Mood normal.       Nataliya Correa PA-C

## 2024-02-21 PROBLEM — Z13.1 DIABETES MELLITUS SCREENING: Status: RESOLVED | Noted: 2018-05-02 | Resolved: 2024-02-21

## 2024-02-21 PROBLEM — Z12.5 PROSTATE CANCER SCREENING: Status: RESOLVED | Noted: 2018-05-02 | Resolved: 2024-02-21

## 2024-02-26 ENCOUNTER — TELEPHONE (OUTPATIENT)
Dept: FAMILY MEDICINE CLINIC | Facility: CLINIC | Age: 69
End: 2024-02-26

## 2024-02-26 ENCOUNTER — TELEPHONE (OUTPATIENT)
Dept: CARDIOLOGY CLINIC | Facility: CLINIC | Age: 69
End: 2024-02-26

## 2024-02-26 NOTE — TELEPHONE ENCOUNTER
P/C left a message on nurse line, had an ablation on 2/2/2024   Has an EKG monitor device at home and it is showing ST depression, has check it 7 times, and 5 times has been showing those results.     Please call [phone]

## 2024-02-26 NOTE — TELEPHONE ENCOUNTER
Patient called saying he completed the antibiotic you gave him. He is still having the same symptoms, cough, congestion etc.  Please advise

## 2024-03-04 ENCOUNTER — PREP FOR PROCEDURE (OUTPATIENT)
Dept: CARDIOLOGY CLINIC | Facility: CLINIC | Age: 69
End: 2024-03-04

## 2024-03-04 ENCOUNTER — TELEPHONE (OUTPATIENT)
Dept: CARDIOLOGY CLINIC | Facility: CLINIC | Age: 69
End: 2024-03-04

## 2024-03-04 ENCOUNTER — OFFICE VISIT (OUTPATIENT)
Dept: CARDIOLOGY CLINIC | Facility: CLINIC | Age: 69
End: 2024-03-04
Payer: MEDICARE

## 2024-03-04 VITALS
DIASTOLIC BLOOD PRESSURE: 76 MMHG | HEIGHT: 70 IN | SYSTOLIC BLOOD PRESSURE: 122 MMHG | BODY MASS INDEX: 33.34 KG/M2 | WEIGHT: 232.9 LBS | HEART RATE: 70 BPM

## 2024-03-04 DIAGNOSIS — I48.0 PAROXYSMAL ATRIAL FIBRILLATION (HCC): Primary | ICD-10-CM

## 2024-03-04 PROCEDURE — 93000 ELECTROCARDIOGRAM COMPLETE: CPT | Performed by: PHYSICIAN ASSISTANT

## 2024-03-04 PROCEDURE — 99213 OFFICE O/P EST LOW 20 MIN: CPT | Performed by: PHYSICIAN ASSISTANT

## 2024-03-04 NOTE — TELEPHONE ENCOUNTER
"Patient is scheduled for LOOP Recorder Implant on 4/3/24 at [facility] with .     Patient aware of all general instructions.    Instructions sent to patient through [employer].      Medication holds:   N/A    Blood work to be done on:  N/A  (Patient did BMP / CBC on 2/3/24)    Insurance: Medicare     Please obtain auth.         Thank you,  Elvira \"Goldie\" Hu      "

## 2024-03-04 NOTE — PROGRESS NOTES
Electrophysiology Office Note    Mark Anthony Bowling  1955  [id number]  [facility]  [address] [place] [id number]    Assessment/Plan     Primary diagnosis:   Atrial fibrillation/flutter   Initially diagnosed in  after SVT, and A-fib found on Biotel  Started on baby aspirin for [id number] score of 1 as well as metoprolol for rate control  Seen by Dr. Lovett 2023 however wanted to avoid ablation  Increased palpitations 2023 agreed to undergo ablation  Status post PVI/CTI line 2024  Currently on Eliquis for stroke prevention post ablation, as well as flecainide for rhythm control  EKG here sinus rhythm w/ RBBB   Plan:  Will continue Eliquis for total of 2 months at which time patient can go back onto baby aspirin for anticoagulation  Continue flecainide for 3 months through blanking period, can consider discontinuation after  Plan for loop recorder implant to follow while off AC   Will have to check with insurance  Follow-up in 3 months for medication review  Hx of RBBB   GERD   BPH       Rhythm History:   Atrial fibrillation:     Atrial flutter:     SVT:     VT/VF/PVC:     Device history:   Pacemaker:    Defibrillator:    BIV PPM:    BIV ICD:    ILR:      Cardiac Testing:     ECHO: Results for orders placed during the hospital encounter of 21    Echo complete with contrast if indicated    Narrative  [address] [address]    Transthoracic Echocardiogram  2D, M-mode, Doppler, and Color Doppler    Study date:  13-May-2021    Patient: MARK ANTHONY BOWLING  MR number: [id number]  Account number: [id number]  : 1955  Age: 66 years  Gender: Male  Status: Outpatient  Location: [facility]  Height: 70 in  Weight: 225 lb  BP: 122/ 68 mmHg    Indications: Atrial Fibrillation    Sonographer:  Bob Ellis RDCS  Referring Physician:  Terell Dowd  MD  Group:  [facility]  Interpreting Physician:  Davy Zarate D.O.    SUMMARY    LEFT VENTRICLE:  Systolic function was normal. Ejection fraction was estimated to be 65 %.  There were no regional wall motion abnormalities.    HISTORY: PRIOR HISTORY: PAC's, Anxiety, Hyperlipidemia    PROCEDURE: The study was performed in the [facility]. This was a routine study. The transthoracic approach was used. The study included complete 2D imaging, M-mode, complete spectral Doppler, and color Doppler. The  heart rate was 78 bpm, at the start of the study. Images were obtained from the parasternal, apical, subcostal, and suprasternal notch acoustic windows. Image quality was adequate.    LEFT VENTRICLE: Size was normal. Systolic function was normal. Ejection fraction was estimated to be 65 %. There were no regional wall motion abnormalities. Wall thickness was normal. No evidence of apical thrombus. DOPPLER: Left  ventricular diastolic function parameters were normal.    RIGHT VENTRICLE: The size was normal. Systolic function was normal. Wall thickness was normal.    LEFT ATRIUM: Size was normal.    RIGHT ATRIUM: Size was normal.    MITRAL VALVE: Valve structure was normal. There was normal leaflet separation. DOPPLER: The transmitral velocity was within the normal range. There was no evidence for stenosis. There was no significant regurgitation.    AORTIC VALVE: The valve was trileaflet. Leaflets exhibited normal thickness and normal cuspal separation. DOPPLER: Transaortic velocity was within the normal range. There was no evidence for stenosis. There was no significant  regurgitation.    TRICUSPID VALVE: The valve structure was normal. There was normal leaflet separation. DOPPLER: The transtricuspid velocity was within the normal range. There was no evidence for stenosis. There was no significant regurgitation.    PULMONIC VALVE: Leaflets exhibited normal thickness, no  calcification, and normal cuspal separation. DOPPLER: The transpulmonic velocity was within the normal range. There was no significant regurgitation.    PERICARDIUM: There was no pericardial effusion. The pericardium was normal in appearance.    AORTA: The root exhibited normal size.    SYSTEMIC VEINS: IVC: The inferior vena cava was normal in size.    SYSTEM MEASUREMENT TABLES    2D  %FS: 43.29 %  Ao Diam: 3.11 cm  EDV(Teich): 89.74 ml  EF(Teich): 74.63 %  ESV(Teich): 22.77 ml  IVSd: 0.7 cm  LA Area: 20.42 cm2  LA Diam: 3.75 cm  LVEDV MOD A4C: 141.44 ml  LVEF MOD A4C: 70.78 %  LVESV MOD A4C: 41.32 ml  LVIDd: 4.44 cm  LVIDs: 2.52 cm  LVLd A4C: 8.72 cm  LVLs A4C: 7.29 cm  LVPWd: 0.81 cm  RA Area: 13.7 cm2  RVIDd: 3.54 cm  SV MOD A4C: 100.12 ml  SV(Teich): 66.97 ml    MM  TAPSE: 1.76 cm    PW  E' Sept: 0.09 m/s  E/E' Sept: 8.61  LVOT Env.Ti: 263.37 ms  LVOT VTI: 16.22 cm  LVOT Vmax: 0.87 m/s  LVOT Vmean: 0.62 m/s  LVOT maxPG: 3.11 mmHg  LVOT meanP.7 mmHg  MV A Lavon: 0.71 m/s  MV Dec Bowman: 3.23 m/s2  MV DecT: 229.34 ms  MV E Lavon: 0.74 m/s  MV E/A Ratio: 1.05  MV PHT: 66.51 ms  MVA By PHT: 3.31 cm2  RVOT Env.Ti: 252.14 ms  RVOT VTI: 15.03 cm  RVOT Vmax: 0.91 m/s  RVOT Vmean: 0.61 m/s  RVOT maxPG: 3.32 mmHg  RVOT meanP.75 mmHg    [employer] Accredited Echocardiography Laboratory    Prepared and electronically signed by    Davy Zarate D.O.  Signed 13-May-2021 14:34:01        History of Present Illness     HPI/INTERVAL HISTORY:  Luca Bowling is a 69 yo male with a past medical history as mentioned above.  Patient was initially diagnosed with SVT, and atrial fibrillation and 2022 after complaining of palpitations and having Biotel monitor placed.  Patient was maintained on aspirin at that time as well as metoprolol.  His [id number] score was only 1 at that time.  He was seen by Dr. Lovett in 2022 and discussed options for atrial fibrillation however did not want to undergo any  invasive procedures at that time.  He was seen by his general cardiologist in 2023 after having increase burden of palpitations.  He underwent atrial fibrillation ablation on 2024 with Dr. Tejeda with PVI as well as CTI line.  He was started on Eliquis after atrial fibrillation ablation as well as Protonix.  In terms of rhythm medication, he was started on flecainide 50 mg twice daily.  Since his ablation, he has been feeling well. He has not had any feelings of palpitations or other symptoms that are concerning for a fib.       Review of Systems   Constitutional:  Negative for fatigue and fever.   Respiratory:  Negative for choking and shortness of breath.    Cardiovascular:  Negative for chest pain and palpitations.   Neurological:  Negative for dizziness and light-headedness.   All other systems reviewed and are negative.    ROS as noted above, otherwise 12 point review of systems was performed and is negative.       Historical Information   Social History     Socioeconomic History   • Marital status: /Civil Union     Spouse name: Not on file   • Number of children: Not on file   • Years of education: Not on file   • Highest education level: Not on file   Occupational History   • Not on file   Tobacco Use   • Smoking status: Former     Current packs/day: 0.00     Average packs/day: 1 pack/day for 3.0 years (3.0 ttl pk-yrs)     Types: Cigarettes     Start date: 1970     Quit date:      Years since quittin.2   • Smokeless tobacco: Never   Vaping Use   • Vaping status: Never Used   Substance and Sexual Activity   • Alcohol use: Yes     Alcohol/week: 3.0 standard drinks of alcohol     Types: 3 Glasses of wine per week     Comment: Occ wine   • Drug use: No   • Sexual activity: Yes     Partners: Female     Birth control/protection: Female Sterilization   Other Topics Concern   • Not on file   Social History Narrative   • Not on file     Social Determinants of Health     Financial  Resource Strain: Patient Declined (6/20/2023)    Overall Financial Resource Strain (CARDIA)    • Difficulty of Paying Living Expenses: Patient declined   Food Insecurity: No Food Insecurity (2/3/2024)    Hunger Vital Sign    • Worried About Running Out of Food in the Last Year: Never true    • Ran Out of Food in the Last Year: Never true   Transportation Needs: No Transportation Needs (2/3/2024)    PRAPARE - Transportation    • Lack of Transportation (Medical): No    • Lack of Transportation (Non-Medical): No   Physical Activity: Not on file   Stress: Not on file   Social Connections: Not on file   Intimate Partner Violence: Not on file   Housing Stability: Low Risk  (2/3/2024)    Housing Stability Vital Sign    • Unable to Pay for Housing in the Last Year: No    • Number of Places Lived in the Last Year: 1    • Unstable Housing in the Last Year: No     Past Medical History:   Diagnosis Date   • Abnormal ECG 05/2021    Had fluttering of my heart   • Basal cell carcinoma    • Colon polyp    • GERD (gastroesophageal reflux disease)    • Hyperlipidemia    • Seasonal allergies    • Squamous cell skin cancer 04/26/2021    Left neck SCCIS     Past Surgical History:   Procedure Laterality Date   • CARDIAC ELECTROPHYSIOLOGY PROCEDURE N/A 2/2/2024    Procedure: Cardiac eps/afib ablation;  Surgeon: Chris Tejeda DO;  Location: [facility];  Service: Cardiology   • CARDIAC ELECTROPHYSIOLOGY PROCEDURE N/A 2/2/2024    Procedure: Cardiac eps/aflutter ablation;  Surgeon: Chris Tejeda DO;  Location: [facility];  Service: Cardiology   • CHOLECYSTECTOMY     • COLONOSCOPY N/A 10/4/2017    Procedure: COLONOSCOPY;  Surgeon: Richie Panda MD;  Location: [facility];  Service: Gastroenterology   • EAR SURGERY      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)   • MOHS SURGERY  05/04/2021    left neck    • OR ESOPHAGOGASTRODUODENOSCOPY TRANSORAL DIAGNOSTIC N/A 5/31/2018    Procedure: ESOPHAGOGASTRODUODENOSCOPY (EGD);   Surgeon: Richie Panda MD;  Location: [facility];  Service: Gastroenterology   • SINUS SURGERY     • SKIN BIOPSY       Social History     Substance and Sexual Activity   Alcohol Use Yes   • Alcohol/week: 3.0 standard drinks of alcohol   • Types: 3 Glasses of wine per week    Comment: Occ wine     Social History     Substance and Sexual Activity   Drug Use No     Social History     Tobacco Use   Smoking Status Former   • Current packs/day: 0.00   • Average packs/day: 1 pack/day for 3.0 years (3.0 ttl pk-yrs)   • Types: Cigarettes   • Start date: 1970   • Quit date:    • Years since quittin.2   Smokeless Tobacco Never     Family History   Problem Relation Age of Onset   • Colon cancer Mother    • Other Mother         septicemia   • Hypertension Father    • Heart attack Father         NSTEMI   • Hypertension Sister    • Squamous cell carcinoma Sister    • Hypertension Brother         Has defibulator and pace maker   • Cancer Brother    • Basal cell carcinoma Brother        Meds/Allergies       Current Outpatient Medications:   •  acetaminophen (TYLENOL) 325 mg tablet, Take 3 tablets (975 mg total) by mouth every 6 (six) hours as needed for mild pain, Disp: , Rfl:   •  ALPRAZolam (XANAX) 0.25 mg tablet, Take 1 tablet (0.25 mg total) by mouth daily as needed for anxiety, Disp: 30 tablet, Rfl: 0  •  apixaban (ELIQUIS) 5 mg, Take 1 tablet (5 mg total) by mouth 2 (two) times a day, Disp: 60 tablet, Rfl: 1  •  atorvastatin (LIPITOR) 40 mg tablet, TAKE 1 TABLET DAILY, Disp: 90 tablet, Rfl: 3  •  colchicine (COLCRYS) 0.6 mg tablet, Take 1 tablet (0.6 mg total) by mouth daily (Patient not taking: Reported on 2024), Disp: 4 tablet, Rfl: 0  •  flecainide (TAMBOCOR) 50 mg tablet, Take 1 tablet (50 mg total) by mouth every 12 (twelve) hours, Disp: 180 tablet, Rfl: 0  •  fluticasone (FLONASE) 50 mcg/act nasal spray, SQUIRT 1 SPRAY INTO EACH NOSTRIL DAILY AS NEEDED, Disp: 48 mL, Rfl: 2  •  metoprolol succinate  (TOPROL-XL) 50 mg 24 hr tablet, TAKE 1 TABLET DAILY, Disp: 90 tablet, Rfl: 3  •  pantoprazole (PROTONIX) 40 mg tablet, TAKE 1 TABLET TWICE A DAY, Disp: 180 tablet, Rfl: 3  •  Probiotic Product (Align) 4 MG CAPS, , Disp: , Rfl:   •  Silodosin 8 MG CAPS, TAKE 1 CAPSULE BY MOUTH EVERYDAY AT BEDTIME, Disp: 90 capsule, Rfl: 2    No Known Allergies    Objective   Vitals: There were no vitals taken for this visit.        Physical Exam  Vitals and nursing note reviewed.   Constitutional:       General: He is not in acute distress.  Cardiovascular:      Rate and Rhythm: Normal rate and regular rhythm.   Pulmonary:      Effort: Pulmonary effort is normal.      Breath sounds: Normal breath sounds.   Musculoskeletal:      Right lower leg: No edema.      Left lower leg: No edema.   Skin:     General: Skin is warm and dry.   Neurological:      General: No focal deficit present.      Mental Status: He is alert and oriented to person, place, and time.   Psychiatric:         Mood and Affect: Mood normal.         Labs:  Office Visit on 02/16/2024   Component Date Value   • RAPID FLU A 02/16/2024 Negative    • RAPID FLU B 02/16/2024 Negative    • POCT SARS-CoV-2 Ag 02/16/2024 Negative    • VALID CONTROL 02/16/2024 Valid    Admission on 02/02/2024, Discharged on 02/03/2024   Component Date Value   • Sodium 02/02/2024 139    • Potassium 02/02/2024 4.5    • Chloride 02/02/2024 108    • CO2 02/02/2024 27    • ANION GAP 02/02/2024 4    • BUN 02/02/2024 13    • Creatinine 02/02/2024 1.01    • Glucose 02/02/2024 145 (H)    • Glucose, Fasting 02/02/2024 145 (H)    • Calcium 02/02/2024 9.3    • eGFR 02/02/2024 77    • Magnesium 02/02/2024 2.1    • WBC 02/02/2024 13.43 (H)    • RBC 02/02/2024 5.74 (H)    • Hemoglobin 02/02/2024 17.5 (H)    • Hematocrit 02/02/2024 51.0 (H)    • MCV 02/02/2024 89    • MCH 02/02/2024 30.5    • MCHC 02/02/2024 34.3    • RDW 02/02/2024 12.2    • MPV 02/02/2024 9.9    • Platelets 02/02/2024 233    • nRBC 02/02/2024 0     • Neutrophils Relative 02/02/2024 72    • Immat GRANS % 02/02/2024 1    • Lymphocytes Relative 02/02/2024 16    • Monocytes Relative 02/02/2024 8    • Eosinophils Relative 02/02/2024 3    • Basophils Relative 02/02/2024 0    • Neutrophils Absolute 02/02/2024 9.71 (H)    • Immature Grans Absolute 02/02/2024 0.09    • Lymphocytes Absolute 02/02/2024 2.13    • Monocytes Absolute 02/02/2024 1.07    • Eosinophils Absolute 02/02/2024 0.37    • Basophils Absolute 02/02/2024 0.06    • Protime 02/02/2024 13.3    • INR 02/02/2024 1.02    • Activated Clotting Time,* 02/02/2024 334 (H)    • Specimen Type 02/02/2024 VENOUS    • Activated Clotting Time,* 02/02/2024 295 (H)    • Specimen Type 02/02/2024 VENOUS    • Activated Clotting Time,* 02/02/2024 347 (H)    • Specimen Type 02/02/2024 VENOUS    • Ventricular Rate 02/02/2024 91    • Atrial Rate 02/02/2024 375    • QRSD Interval 02/02/2024 118    • QT Interval 02/02/2024 380    • QTC Interval 02/02/2024 467    • QRS Axis 02/02/2024 -50    • T Wave Axis 02/02/2024 -2    • Activated Clotting Time,* 02/02/2024 374 (H)    • Specimen Type 02/02/2024 VENOUS    • Activated Clotting Time,* 02/02/2024 334 (H)    • Specimen Type 02/02/2024 VENOUS    • Activated Clotting Time,* 02/02/2024 408 (H)    • Specimen Type 02/02/2024 VENOUS    • Activated Clotting Time,* 02/02/2024 361 (H)    • Specimen Type 02/02/2024 VENOUS    • Activated Clotting Time,* 02/02/2024 361 (H)    • Specimen Type 02/02/2024 VENOUS    • Sodium 02/03/2024 137    • Potassium 02/03/2024 4.3    • Chloride 02/03/2024 105    • CO2 02/03/2024 23    • ANION GAP 02/03/2024 9    • BUN 02/03/2024 15    • Creatinine 02/03/2024 1.07    • Glucose 02/03/2024 228 (H)    • Calcium 02/03/2024 8.5    • eGFR 02/03/2024 71    • WBC 02/03/2024 16.00 (H)    • RBC 02/03/2024 4.96    • Hemoglobin 02/03/2024 15.0    • Hematocrit 02/03/2024 46.2    • MCV 02/03/2024 93    • MCH 02/03/2024 30.2    • MCHC 02/03/2024 32.5    • RDW 02/03/2024  12.4    • Platelets 02/03/2024 205    • MPV 02/03/2024 9.9    • Protime 02/03/2024 14.9 (H)    • INR 02/03/2024 1.18    • Ventricular Rate 02/02/2024 77    • Atrial Rate 02/02/2024 77    • VA Interval 02/02/2024 138    • QRSD Interval 02/02/2024 150    • QT Interval 02/02/2024 413    • QTC Interval 02/02/2024 468    • P Axis 02/02/2024 61    • QRS Axis 02/02/2024 -41    • T Wave Axis 02/02/2024 -17        Imaging: I have personally reviewed pertinent reports.

## 2024-03-04 NOTE — TELEPHONE ENCOUNTER
"----- Message -----  From: Chris Tejeda DO  Sent: 3/5/2024   3:27 PM EST  To: Elvira Lui  Subject: RE: LOOP IMPLANT                                 Funes ok. Thanks. Chris      ----- Message -----  From: Elvira Lui  Sent: 3/4/2024   3:06 PM EST  To: Chris Tejeda DO; Elvira Lui  Subject: LOOP IMPLANT                                     Which company for LOOP per below message? [employer] or [employer].    Thanks,  Elvira \"Goldie\" Hu    ----- Message from Ariella Blackwood MA sent at 3/4/2024  2:02 PM EST -----    ----- Message -----  From: Meghan Foster MA  Sent: 3/4/2024  12:26 PM EST  To: Rasta Unger PA-C; #    Auth is not required.  Verified that Medicare is primary with a secondary on file.  ----- Message -----  From: Rasta Unger PA-C  Sent: 3/4/2024  11:57 AM EST  To: Meghan Foster MA; #    Hi can we please check to see if this patients insurance covers [employer] or [employer] loop implant? Will need it for a fib monitoring off AC. If covered can we please schedule for implant. Thanks.     Evert Unger PA-C         "

## 2024-04-01 NOTE — DISCHARGE INSTR - AVS FIRST PAGE
It is acceptable to shower with the glue in place. The glue will fall off in 1 week on its own, but we ask please do not scrub the area or swim during the next 14 days. Do not use lotions/powders/creams on incision. Remove outer bandage 48 hours after procedure. Please call the office [phone] if you notice redness, swelling, bleeding, or drainage from incision or if you develop fevers.

## 2024-04-03 ENCOUNTER — HOSPITAL ENCOUNTER (OUTPATIENT)
Facility: HOSPITAL | Age: 69
Setting detail: OUTPATIENT SURGERY
Discharge: HOME | End: 2024-04-03
Attending: INTERNAL MEDICINE | Admitting: INTERNAL MEDICINE
Payer: MEDICARE

## 2024-04-03 VITALS
RESPIRATION RATE: 18 BRPM | SYSTOLIC BLOOD PRESSURE: 145 MMHG | DIASTOLIC BLOOD PRESSURE: 69 MMHG | TEMPERATURE: 97.5 F | OXYGEN SATURATION: 92 % | HEART RATE: 70 BPM

## 2024-04-03 DIAGNOSIS — I48.0 PAROXYSMAL ATRIAL FIBRILLATION (HCC): ICD-10-CM

## 2024-04-03 PROCEDURE — NC001 PR NO CHARGE: Performed by: PHYSICIAN ASSISTANT

## 2024-04-03 PROCEDURE — C1764 EVENT RECORDER, CARDIAC: HCPCS | Performed by: INTERNAL MEDICINE

## 2024-04-03 PROCEDURE — 33285 INSJ SUBQ CAR RHYTHM MNTR: CPT | Performed by: INTERNAL MEDICINE

## 2024-04-03 PROCEDURE — 33285 INSJ SUBQ CAR RHYTHM MNTR: CPT | Performed by: PHYSICIAN ASSISTANT

## 2024-04-03 DEVICE — MONITOR INSERTABLE CARDIAC ASSERT IQ EL+: Type: IMPLANTABLE DEVICE | Site: CHEST  WALL | Status: FUNCTIONAL

## 2024-04-03 RX ORDER — LIDOCAINE HYDROCHLORIDE 10 MG/ML
INJECTION, SOLUTION EPIDURAL; INFILTRATION; INTRACAUDAL; PERINEURAL CODE/TRAUMA/SEDATION MEDICATION
Status: DISCONTINUED | OUTPATIENT
Start: 2024-04-03 | End: 2024-04-03 | Stop reason: HOSPADM

## 2024-04-03 RX ORDER — LIDOCAINE HYDROCHLORIDE 10 MG/ML
INJECTION, SOLUTION EPIDURAL; INFILTRATION; INTRACAUDAL; PERINEURAL
Status: DISCONTINUED
Start: 2024-04-03 | End: 2024-04-03 | Stop reason: HOSPADM

## 2024-04-03 NOTE — H&P
Patient presents for loop recorder implantation. He has a history of atrial fibrillation and flutter since 2022. He recently underwent ablation in 2024 and wishes to come off anticoagulation in the future. Loop implant for monitoring.

## 2024-04-12 ENCOUNTER — OFFICE VISIT (OUTPATIENT)
Dept: FAMILY MEDICINE CLINIC | Facility: CLINIC | Age: 69
End: 2024-04-12

## 2024-04-12 VITALS
HEIGHT: 70 IN | SYSTOLIC BLOOD PRESSURE: 124 MMHG | HEART RATE: 66 BPM | BODY MASS INDEX: 32.74 KG/M2 | DIASTOLIC BLOOD PRESSURE: 84 MMHG | OXYGEN SATURATION: 98 % | TEMPERATURE: 98.5 F | WEIGHT: 228.7 LBS

## 2024-04-12 DIAGNOSIS — L08.9 INFECTED WOUND: Primary | ICD-10-CM

## 2024-04-12 DIAGNOSIS — T14.8XXA INFECTED WOUND: Primary | ICD-10-CM

## 2024-04-12 DIAGNOSIS — I47.10 SVT (SUPRAVENTRICULAR TACHYCARDIA): ICD-10-CM

## 2024-04-12 DIAGNOSIS — R00.2 PALPITATION: ICD-10-CM

## 2024-04-12 DIAGNOSIS — R21 RASH AND NONSPECIFIC SKIN ERUPTION: ICD-10-CM

## 2024-04-12 RX ORDER — CEPHALEXIN 500 MG/1
500 CAPSULE ORAL EVERY 8 HOURS SCHEDULED
Qty: 21 CAPSULE | Refills: 0 | Status: SHIPPED | OUTPATIENT
Start: 2024-04-12 | End: 2024-04-19

## 2024-04-12 RX ORDER — METOPROLOL SUCCINATE 50 MG/1
TABLET, EXTENDED RELEASE ORAL
Qty: 90 TABLET | Refills: 1 | Status: SHIPPED | OUTPATIENT
Start: 2024-04-12

## 2024-04-12 NOTE — PROGRESS NOTES
"Name: Luca Bowling      : 1955      MRN: [id number]  Encounter Provider: Nataliya Correa PA-C  Encounter Date: 2024   Encounter department: [facility]    Assessment & Plan     1. Infected wound  -     cephalexin (KEFLEX) 500 mg capsule; Take 1 capsule (500 mg total) by mouth every 8 (eight) hours for 7 days    2. Rash and nonspecific skin eruption  -     Ambulatory Referral to Dermatology; Future    Patient presents for evaluation of \"sores on his back\" x 1 month. On exam, appear to be scabbed over popped blisters with surrounding erythema and swelling. Suspect infected wounds; potential folliculitis from previous shaving of his back that became infected? Unclear etiology. Covering for infection with keflex x 7 days (educated on potential side effects) and placed a referral to derm for further evaluation. Otherwise advised to keep the area clean and let me know if persists after abx treatment. ER if symptoms worsen. To call with any questions or concerns.        Subjective      CC: rash on back    Patient presents for evaluation of \"sores\" on back. Notes he had surgery a few months ago where they shaved his back and noticed some irritation afterward. However about a month ago thought he had some \"sores on his back\", sometimes cause him discomfort when shirt rubs against them. However yesterday he looked in the mirror and thought they looked like blisters so wanted to get them looked at.       Review of Systems   Constitutional:  Negative for chills, diaphoresis and fever.   Respiratory:  Negative for cough, chest tightness, shortness of breath and wheezing.    Cardiovascular:  Negative for chest pain, palpitations and leg swelling.   Musculoskeletal:  Negative for back pain.   Skin:  Positive for rash.       Current Outpatient Medications on File Prior to Visit   Medication Sig    ALPRAZolam (XANAX) 0.25 mg tablet Take 1 tablet (0.25 mg total) by mouth daily as needed for " "anxiety    apixaban (ELIQUIS) 5 mg Take 1 tablet (5 mg total) by mouth 2 (two) times a day    atorvastatin (LIPITOR) 40 mg tablet TAKE 1 TABLET DAILY    flecainide (TAMBOCOR) 50 mg tablet Take 1 tablet (50 mg total) by mouth every 12 (twelve) hours    fluticasone (FLONASE) 50 mcg/act nasal spray SQUIRT 1 SPRAY INTO EACH NOSTRIL DAILY AS NEEDED    metoprolol succinate (TOPROL-XL) 50 mg 24 hr tablet TAKE 1 TABLET DAILY    pantoprazole (PROTONIX) 40 mg tablet TAKE 1 TABLET TWICE A DAY    Probiotic Product (Align) 4 MG CAPS     Silodosin 8 MG CAPS TAKE 1 CAPSULE BY MOUTH EVERYDAY AT BEDTIME    acetaminophen (TYLENOL) 325 mg tablet Take 3 tablets (975 mg total) by mouth every 6 (six) hours as needed for mild pain    [DISCONTINUED] metoprolol succinate (TOPROL-XL) 50 mg 24 hr tablet TAKE 1 TABLET DAILY       Objective     /84   Pulse 66   Temp 98.5 °F (36.9 °C)   Ht 5' 10\" (1.778 m)   Wt 104 kg (228 lb 11.2 oz)   SpO2 98%   BMI 32.82 kg/m²     Physical Exam  Vitals reviewed.   Constitutional:       General: He is not in acute distress.     Appearance: Normal appearance. He is not ill-appearing or diaphoretic.   HENT:      Head: Normocephalic and atraumatic.      Right Ear: External ear normal.      Left Ear: External ear normal.      Nose: Nose normal.      Mouth/Throat:      Mouth: Mucous membranes are moist.   Eyes:      General:         Right eye: No discharge.         Left eye: No discharge.      Conjunctiva/sclera: Conjunctivae normal.   Cardiovascular:      Rate and Rhythm: Normal rate and regular rhythm.      Pulses: Normal pulses.      Heart sounds: Normal heart sounds. No murmur heard.  Pulmonary:      Effort: Pulmonary effort is normal. No respiratory distress.      Breath sounds: Normal breath sounds. No wheezing, rhonchi or rales.   Musculoskeletal:         General: Normal range of motion.      Cervical back: Normal range of motion and neck supple.   Skin:     General: Skin is warm.        "   Neurological:      General: No focal deficit present.      Mental Status: He is alert.      Gait: Gait normal.   Psychiatric:         Mood and Affect: Mood normal.       Nataliya Correa PA-C

## 2024-04-18 ENCOUNTER — IN-CLINIC DEVICE VISIT (OUTPATIENT)
Dept: CARDIOLOGY CLINIC | Facility: CLINIC | Age: 69
End: 2024-04-18
Payer: MEDICARE

## 2024-04-18 DIAGNOSIS — Z95.818 PRESENCE OF OTHER CARDIAC IMPLANTS AND GRAFTS: Primary | ICD-10-CM

## 2024-04-18 PROCEDURE — 93285 PRGRMG DEV EVAL SCRMS IP: CPT | Performed by: INTERNAL MEDICINE

## 2024-04-18 NOTE — PROGRESS NOTES
OSIEL [id number] ICM/ACTIVE SYSTEM IS MRI CONDITIONAL   DEVICE INTERROGATED IN THE [place] OFFICE:  BATTERY VOLTAGE ADEQUATE.  NO PATIENT OR DEVICE ACTIVATED EPISODES.  NO PROGRAMMING CHANGES MADE TO DEVICE PARAMETERS.  INCISION CLEAN AND DRY WITH EDGES APPROXIMATED; SUTURES REMOVED; WOUND CARE AND RESTRICTIONS REVIEWED WITH PATIENT.  NORMAL DEVICE FUNCTION.  RG

## 2024-04-19 DIAGNOSIS — E78.5 HYPERLIPIDEMIA, UNSPECIFIED HYPERLIPIDEMIA TYPE: ICD-10-CM

## 2024-04-19 RX ORDER — ATORVASTATIN CALCIUM 40 MG/1
TABLET, FILM COATED ORAL
Qty: 90 TABLET | Refills: 3 | Status: SHIPPED | OUTPATIENT
Start: 2024-04-19

## 2024-04-22 DIAGNOSIS — K21.9 GASTROESOPHAGEAL REFLUX DISEASE WITHOUT ESOPHAGITIS: ICD-10-CM

## 2024-04-23 RX ORDER — PANTOPRAZOLE SODIUM 40 MG/1
40 TABLET, DELAYED RELEASE ORAL 2 TIMES DAILY
Qty: 180 TABLET | Refills: 1 | Status: SHIPPED | OUTPATIENT
Start: 2024-04-23

## 2024-04-29 DIAGNOSIS — I48.0 PAROXYSMAL ATRIAL FIBRILLATION (HCC): ICD-10-CM

## 2024-04-30 RX ORDER — FLECAINIDE ACETATE 50 MG/1
50 TABLET ORAL EVERY 12 HOURS SCHEDULED
Qty: 180 TABLET | Refills: 0 | Status: SHIPPED | OUTPATIENT
Start: 2024-04-30

## 2024-05-14 ENCOUNTER — OFFICE VISIT (OUTPATIENT)
Dept: CARDIOLOGY CLINIC | Facility: CLINIC | Age: 69
End: 2024-05-14
Payer: MEDICARE

## 2024-05-14 VITALS
HEART RATE: 67 BPM | HEIGHT: 70 IN | DIASTOLIC BLOOD PRESSURE: 68 MMHG | SYSTOLIC BLOOD PRESSURE: 112 MMHG | WEIGHT: 231.6 LBS | BODY MASS INDEX: 33.16 KG/M2

## 2024-05-14 DIAGNOSIS — I48.0 PAF (PAROXYSMAL ATRIAL FIBRILLATION) (HCC): Primary | ICD-10-CM

## 2024-05-14 PROCEDURE — 99213 OFFICE O/P EST LOW 20 MIN: CPT | Performed by: PHYSICIAN ASSISTANT

## 2024-05-14 PROCEDURE — 93000 ELECTROCARDIOGRAM COMPLETE: CPT | Performed by: PHYSICIAN ASSISTANT

## 2024-05-14 RX ORDER — CLOTRIMAZOLE 1 %
CREAM (GRAM) TOPICAL
COMMUNITY
Start: 2024-05-06

## 2024-05-14 RX ORDER — DOXYCYCLINE HYCLATE 100 MG/1
100 CAPSULE ORAL 2 TIMES DAILY WITH MEALS
COMMUNITY
Start: 2024-05-06

## 2024-05-14 NOTE — PROGRESS NOTES
Electrophysiology Office Note    Luca Bowling  1955  [id number]  [facility] CARDIOLOGY [facility]  [address] [place] [id number]    Assessment/Plan     Primary diagnosis:   Atrial fibrillation/flutter   Initially diagnosed in  after SVT, and A-fib found on Biotel  Started on baby aspirin for [id number] score of 1 as well as metoprolol for rate control  Seen by Dr. Lovett 2023 however wanted to avoid ablation  Increased palpitations 2023 agreed to undergo ablation  Status post PVI/CTI line 2024  EKG here sinus rhythm w/ RBBB   Plan:  Discontinue eliquis C2V score 1. Restart if a fib seen on loop   Stop flecainide   Follow loop readings   F/u 6 months   S/p [employer] Assert ILR implanted 4/3/24  Interrogated today without any significant high rate episode/afib   Hx of RBBB   GERD   BPH     Rhythm History:   Atrial fibrillation:     Atrial flutter:     SVT:     VT/VF/PVC:     Device history:   Pacemaker:    Defibrillator:    BIV PPM:    BIV ICD:    ILR:      Cardiac Testing:     ECHO: Results for orders placed during the hospital encounter of 21    Echo complete with contrast if indicated    Narrative  [place] [id number]    Transthoracic Echocardiogram  2D, M-mode, Doppler, and Color Doppler    Study date:  13-May-2021    Patient: LUCA BOWLING  MR number: [id number]  Account number: [id number]  : 1955  Age: 66 years  Gender: Male  Status: Outpatient  Location: [facility]  Height: 70 in  Weight: 225 lb  BP: 122/ 68 mmHg    Indications: Atrial Fibrillation    Sonographer:  Bob Ellis RDCS  Referring Physician:  Terell Dowd MD  Group:  [facility]  Interpreting Physician:  Davy Zarate D.O.    SUMMARY    LEFT VENTRICLE:  Systolic function was normal. Ejection fraction was estimated to be 65 %.  There were no regional  wall motion abnormalities.    HISTORY: PRIOR HISTORY: PAC's, Anxiety, Hyperlipidemia    PROCEDURE: The study was performed in the [facility]. This was a routine study. The transthoracic approach was used. The study included complete 2D imaging, M-mode, complete spectral Doppler, and color Doppler. The  heart rate was 78 bpm, at the start of the study. Images were obtained from the parasternal, apical, subcostal, and suprasternal notch acoustic windows. Image quality was adequate.    LEFT VENTRICLE: Size was normal. Systolic function was normal. Ejection fraction was estimated to be 65 %. There were no regional wall motion abnormalities. Wall thickness was normal. No evidence of apical thrombus. DOPPLER: Left  ventricular diastolic function parameters were normal.    RIGHT VENTRICLE: The size was normal. Systolic function was normal. Wall thickness was normal.    LEFT ATRIUM: Size was normal.    RIGHT ATRIUM: Size was normal.    MITRAL VALVE: Valve structure was normal. There was normal leaflet separation. DOPPLER: The transmitral velocity was within the normal range. There was no evidence for stenosis. There was no significant regurgitation.    AORTIC VALVE: The valve was trileaflet. Leaflets exhibited normal thickness and normal cuspal separation. DOPPLER: Transaortic velocity was within the normal range. There was no evidence for stenosis. There was no significant  regurgitation.    TRICUSPID VALVE: The valve structure was normal. There was normal leaflet separation. DOPPLER: The transtricuspid velocity was within the normal range. There was no evidence for stenosis. There was no significant regurgitation.    PULMONIC VALVE: Leaflets exhibited normal thickness, no calcification, and normal cuspal separation. DOPPLER: The transpulmonic velocity was within the normal range. There was no significant regurgitation.    PERICARDIUM: There was no pericardial effusion. The pericardium was normal in  appearance.    AORTA: The root exhibited normal size.    SYSTEMIC VEINS: IVC: The inferior vena cava was normal in size.    SYSTEM MEASUREMENT TABLES    2D  %FS: 43.29 %  Ao Diam: 3.11 cm  EDV(Teich): 89.74 ml  EF(Teich): 74.63 %  ESV(Teich): 22.77 ml  IVSd: 0.7 cm  LA Area: 20.42 cm2  LA Diam: 3.75 cm  LVEDV MOD A4C: 141.44 ml  LVEF MOD A4C: 70.78 %  LVESV MOD A4C: 41.32 ml  LVIDd: 4.44 cm  LVIDs: 2.52 cm  LVLd A4C: 8.72 cm  LVLs A4C: 7.29 cm  LVPWd: 0.81 cm  RA Area: 13.7 cm2  RVIDd: 3.54 cm  SV MOD A4C: 100.12 ml  SV(Teich): 66.97 ml    MM  TAPSE: 1.76 cm    PW  E' Sept: 0.09 m/s  E/E' Sept: 8.61  LVOT Env.Ti: 263.37 ms  LVOT VTI: 16.22 cm  LVOT Vmax: 0.87 m/s  LVOT Vmean: 0.62 m/s  LVOT maxPG: 3.11 mmHg  LVOT meanP.7 mmHg  MV A Lavon: 0.71 m/s  MV Dec Craig: 3.23 m/s2  MV DecT: 229.34 ms  MV E Lavon: 0.74 m/s  MV E/A Ratio: 1.05  MV PHT: 66.51 ms  MVA By PHT: 3.31 cm2  RVOT Env.Ti: 252.14 ms  RVOT VTI: 15.03 cm  RVOT Vmax: 0.91 m/s  RVOT Vmean: 0.61 m/s  RVOT maxPG: 3.32 mmHg  RVOT meanP.75 mmHg    [employer] Accredited Echocardiography Laboratory    Prepared and electronically signed by    Davy Zarate D.O.  Signed 13-May-2021 14:34:01        History of Present Illness     HPI/INTERVAL HISTORY:  Luca Bowling is a 69 yo male with a past medical history as mentioned above.  Patient was initially diagnosed with SVT, and atrial fibrillation and 2022 after complaining of palpitations and having Biotel monitor placed.  Patient was maintained on aspirin at that time as well as metoprolol.  His [id number]-[id number] score was only 1 at that time.  He was seen by Dr. Lovett in 2022 and discussed options for atrial fibrillation however did not want to undergo any invasive procedures at that time.  He was seen by his general cardiologist in 2023 after having increase burden of palpitations.  He underwent atrial fibrillation ablation on 2024 with Dr. Tejeda with PVI as well as CTI  line.  He was started on Eliquis after atrial fibrillation ablation as well as Protonix.  In terms of rhythm medication, he was started on flecainide 50 mg twice daily.  Since his ablation, he has been feeling well. He has not had any feelings of palpitations or other symptoms that are concerning for a fib.     2024: Patient following up today after loop recorder implantation.  Has not had any palpitations since ablation in February.  Underwent implantable loop recorder implantation by myself on 4/3/2024.  At this point, discontinue Eliquis given low [id number] score of 1 and high cost of medication.  Will also discontinue flecainide as patient has been maintaining sinus rhythm.  Discussed plan to follow loop recorder for reinitiation of anticoagulation with atrial fibrillation plus minus reinitiation of antiarrhythmic.      Review of Systems   Constitutional:  Negative for diaphoresis and fatigue.   Respiratory:  Negative for chest tightness and shortness of breath.    Cardiovascular:  Negative for chest pain and palpitations.   Neurological:  Negative for dizziness and light-headedness.   All other systems reviewed and are negative.    ROS as noted above, otherwise 12 point review of systems was performed and is negative.       Historical Information   Social History     Socioeconomic History   • Marital status: /Civil Union     Spouse name: Not on file   • Number of children: Not on file   • Years of education: Not on file   • Highest education level: Not on file   Occupational History   • Not on file   Tobacco Use   • Smoking status: Former     Current packs/day: 0.00     Average packs/day: 1 pack/day for 3.0 years (3.0 ttl pk-yrs)     Types: Cigarettes     Start date: 1970     Quit date:      Years since quittin.4   • Smokeless tobacco: Never   Vaping Use   • Vaping status: Never Used   Substance and Sexual Activity   • Alcohol use: Yes     Alcohol/week: 3.0 standard drinks of alcohol      Types: 3 Glasses of wine per week     Comment: Occ wine   • Drug use: No   • Sexual activity: Yes     Partners: Female     Birth control/protection: Female Sterilization   Other Topics Concern   • Not on file   Social History Narrative   • Not on file     Social Determinants of Health     Financial Resource Strain: Patient Declined (6/20/2023)    Overall Financial Resource Strain (CARDIA)    • Difficulty of Paying Living Expenses: Patient declined   Food Insecurity: No Food Insecurity (2/3/2024)    Hunger Vital Sign    • Worried About Running Out of Food in the Last Year: Never true    • Ran Out of Food in the Last Year: Never true   Transportation Needs: No Transportation Needs (2/3/2024)    PRAPARE - Transportation    • Lack of Transportation (Medical): No    • Lack of Transportation (Non-Medical): No   Physical Activity: Not on file   Stress: Not on file   Social Connections: Not on file   Intimate Partner Violence: Not on file   Housing Stability: Low Risk  (2/3/2024)    Housing Stability Vital Sign    • Unable to Pay for Housing in the Last Year: No    • Number of Places Lived in the Last Year: 1    • Unstable Housing in the Last Year: No     Past Medical History:   Diagnosis Date   • Abnormal ECG 05/2021    Had fluttering of my heart   • Basal cell carcinoma    • Colon polyp    • GERD (gastroesophageal reflux disease)    • Hyperlipidemia    • Seasonal allergies    • Squamous cell skin cancer 04/26/2021    Left neck SCCIS     Past Surgical History:   Procedure Laterality Date   • CARDIAC ELECTROPHYSIOLOGY PROCEDURE N/A 2/2/2024    Procedure: Cardiac eps/afib ablation;  Surgeon: Chris Tejeda DO;  Location: [facility];  Service: Cardiology   • CARDIAC ELECTROPHYSIOLOGY PROCEDURE N/A 2/2/2024    Procedure: Cardiac eps/aflutter ablation;  Surgeon: Chris Tejeda DO;  Location: [facility];  Service: Cardiology   • CARDIAC ELECTROPHYSIOLOGY PROCEDURE N/A 4/3/2024    Procedure: Cardiac loop  recorder implant;  Surgeon: David Ernst MD;  Location:  [facility] C[facility];  Service: Cardiology   • CHOLECYSTECTOMY     • COLONOSCOPY N/A 10/4/2017    Procedure: COLONOSCOPY;  Surgeon: Richie Panda MD;  Location: [facility];  Service: Gastroenterology   • EAR SURGERY      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)   • MOHS SURGERY  2021    left neck    • NE ESOPHAGOGASTRODUODENOSCOPY TRANSORAL DIAGNOSTIC N/A 2018    Procedure: ESOPHAGOGASTRODUODENOSCOPY (EGD);  Surgeon: Richie Panda MD;  Location: [facility];  Service: Gastroenterology   • SINUS SURGERY     • SKIN BIOPSY       Social History     Substance and Sexual Activity   Alcohol Use Yes   • Alcohol/week: 3.0 standard drinks of alcohol   • Types: 3 Glasses of wine per week    Comment: Occ wine     Social History     Substance and Sexual Activity   Drug Use No     Social History     Tobacco Use   Smoking Status Former   • Current packs/day: 0.00   • Average packs/day: 1 pack/day for 3.0 years (3.0 ttl pk-yrs)   • Types: Cigarettes   • Start date: 1970   • Quit date:    • Years since quittin.4   Smokeless Tobacco Never     Family History   Problem Relation Age of Onset   • Colon cancer Mother    • Other Mother         septicemia   • Hypertension Father    • Heart attack Father         NSTEMI   • Hypertension Sister    • Squamous cell carcinoma Sister    • Hypertension Brother         Has defibulator and pace maker   • Cancer Brother    • Basal cell carcinoma Brother        Meds/Allergies       Current Outpatient Medications:   •  acetaminophen (TYLENOL) 325 mg tablet, Take 3 tablets (975 mg total) by mouth every 6 (six) hours as needed for mild pain, Disp: , Rfl:   •  ALPRAZolam (XANAX) 0.25 mg tablet, Take 1 tablet (0.25 mg total) by mouth daily as needed for anxiety, Disp: 30 tablet, Rfl: 0  •  apixaban (ELIQUIS) 5 mg, Take 1 tablet (5 mg total) by mouth 2 (two) times a day, Disp: 60 tablet, Rfl: 1  •  atorvastatin (LIPITOR)  40 mg tablet, TAKE 1 TABLET DAILY, Disp: 90 tablet, Rfl: 3  •  flecainide (TAMBOCOR) 50 mg tablet, Take 1 tablet (50 mg total) by mouth every 12 (twelve) hours, Disp: 180 tablet, Rfl: 0  •  fluticasone (FLONASE) 50 mcg/act nasal spray, SQUIRT 1 SPRAY INTO EACH NOSTRIL DAILY AS NEEDED, Disp: 48 mL, Rfl: 2  •  metoprolol succinate (TOPROL-XL) 50 mg 24 hr tablet, TAKE 1 TABLET DAILY, Disp: 90 tablet, Rfl: 1  •  pantoprazole (PROTONIX) 40 mg tablet, Take 1 tablet (40 mg total) by mouth 2 (two) times a day, Disp: 180 tablet, Rfl: 1  •  Probiotic Product (Align) 4 MG CAPS, , Disp: , Rfl:   •  Silodosin 8 MG CAPS, TAKE 1 CAPSULE BY MOUTH EVERYDAY AT BEDTIME, Disp: 90 capsule, Rfl: 2    No Known Allergies    Objective   Vitals: There were no vitals taken for this visit.        Physical Exam  Vitals and nursing note reviewed.   Constitutional:       General: He is not in acute distress.  Cardiovascular:      Rate and Rhythm: Normal rate and regular rhythm.   Pulmonary:      Effort: Pulmonary effort is normal.      Breath sounds: Normal breath sounds.   Skin:     General: Skin is warm and dry.   Neurological:      General: No focal deficit present.      Mental Status: He is alert and oriented to person, place, and time.   Psychiatric:         Mood and Affect: Mood normal.         Labs:  No visits with results within 2 Month(s) from this visit.   Latest known visit with results is:   Office Visit on 02/16/2024   Component Date Value   • RAPID FLU A 02/16/2024 Negative    • RAPID FLU B 02/16/2024 Negative    • POCT SARS-CoV-2 Ag 02/16/2024 Negative    • VALID CONTROL 02/16/2024 Valid        Imaging: I have personally reviewed pertinent reports.

## 2024-05-26 DIAGNOSIS — N13.8 BPH WITH OBSTRUCTION/LOWER URINARY TRACT SYMPTOMS: ICD-10-CM

## 2024-05-26 DIAGNOSIS — N40.1 BPH WITH OBSTRUCTION/LOWER URINARY TRACT SYMPTOMS: ICD-10-CM

## 2024-05-26 RX ORDER — SILODOSIN 8 MG/1
1 CAPSULE ORAL
Qty: 90 CAPSULE | Refills: 1 | Status: SHIPPED | OUTPATIENT
Start: 2024-05-26

## 2024-07-15 PROBLEM — L73.9 FOLLICULITIS: Status: RESOLVED | Noted: 2024-03-01 | Resolved: 2024-07-15

## 2024-07-15 PROBLEM — L73.9 FOLLICULITIS: Status: ACTIVE | Noted: 2024-03-01

## 2024-07-15 RX ORDER — TRIAMCINOLONE ACETONIDE 1 MG/G
CREAM TOPICAL
COMMUNITY
Start: 2024-05-21 | End: 2024-07-16

## 2024-07-15 RX ORDER — BENZOYL PEROXIDE 50 MG/ML
LIQUID TOPICAL
COMMUNITY
Start: 2024-05-21 | End: 2024-07-16

## 2024-07-16 ENCOUNTER — OFFICE VISIT (OUTPATIENT)
Dept: FAMILY MEDICINE CLINIC | Facility: CLINIC | Age: 69
End: 2024-07-16
Payer: MEDICARE

## 2024-07-16 VITALS
TEMPERATURE: 99.4 F | OXYGEN SATURATION: 95 % | SYSTOLIC BLOOD PRESSURE: 128 MMHG | WEIGHT: 232 LBS | HEART RATE: 90 BPM | BODY MASS INDEX: 33.21 KG/M2 | HEIGHT: 70 IN | DIASTOLIC BLOOD PRESSURE: 70 MMHG

## 2024-07-16 DIAGNOSIS — I48.0 PAROXYSMAL ATRIAL FIBRILLATION (HCC): ICD-10-CM

## 2024-07-16 DIAGNOSIS — I48.92 ATRIAL FLUTTER, UNSPECIFIED TYPE (HCC): ICD-10-CM

## 2024-07-16 DIAGNOSIS — Z00.00 MEDICARE ANNUAL WELLNESS VISIT, SUBSEQUENT: ICD-10-CM

## 2024-07-16 DIAGNOSIS — R73.01 IMPAIRED FASTING GLUCOSE: ICD-10-CM

## 2024-07-16 DIAGNOSIS — Z12.5 SCREENING FOR PROSTATE CANCER: ICD-10-CM

## 2024-07-16 DIAGNOSIS — E78.2 MIXED HYPERLIPIDEMIA: ICD-10-CM

## 2024-07-16 DIAGNOSIS — J06.9 VIRAL URI: Primary | ICD-10-CM

## 2024-07-16 DIAGNOSIS — Z11.59 SCREENING FOR VIRAL DISEASE: ICD-10-CM

## 2024-07-16 LAB
SARS-COV-2 AG UPPER RESP QL IA: NEGATIVE
VALID CONTROL: NORMAL

## 2024-07-16 PROCEDURE — 87811 SARS-COV-2 COVID19 W/OPTIC: CPT | Performed by: FAMILY MEDICINE

## 2024-07-16 PROCEDURE — 99213 OFFICE O/P EST LOW 20 MIN: CPT | Performed by: FAMILY MEDICINE

## 2024-07-16 PROCEDURE — G0438 PPPS, INITIAL VISIT: HCPCS | Performed by: FAMILY MEDICINE

## 2024-07-16 RX ORDER — BENZONATATE 200 MG/1
200 CAPSULE ORAL 3 TIMES DAILY PRN
Qty: 20 CAPSULE | Refills: 0 | Status: SHIPPED | OUTPATIENT
Start: 2024-07-16

## 2024-07-16 NOTE — PATIENT INSTRUCTIONS
Medicare Preventive Visit Patient Instructions  Thank you for completing your Welcome to Medicare Visit or Medicare Annual Wellness Visit today. Your next wellness visit will be due in one year (7/17/2025).  The screening/preventive services that you may require over the next 5-10 years are detailed below. Some tests may not apply to you based off risk factors and/or age. Screening tests ordered at today's visit but not completed yet may show as past due. Also, please note that scanned in results may not display below.  Preventive Screenings:  Service Recommendations Previous Testing/Comments   Colorectal Cancer Screening  Colonoscopy    Fecal Occult Blood Test (FOBT)/Fecal Immunochemical Test (FIT)  Fecal DNA/Cologuard Test  Flexible Sigmoidoscopy Age: 45-75 years old   Colonoscopy: every 10 years (May be performed more frequently if at higher risk)  OR  FOBT/FIT: every 1 year  OR  Cologuard: every 3 years  OR  Sigmoidoscopy: every 5 years  Screening may be recommended earlier than age 45 if at higher risk for colorectal cancer. Also, an individualized decision between you and your healthcare provider will decide whether screening between the ages of 76-85 would be appropriate. Colonoscopy: 09/19/2023  FOBT/FIT: Not on file  Cologuard: Not on file  Sigmoidoscopy: Not on file    Screening Current     Prostate Cancer Screening Individualized decision between patient and health care provider in men between ages of 55-69   Medicare will cover every 12 months beginning on the day after your 50th birthday PSA: 2.2 ng/mL     Screening Current  Due for PSA     Hepatitis C Screening Once for adults born between 1945 and 1965  More frequently in patients at high risk for Hepatitis C Hep C Antibody: 05/07/2018    Screening Current   Diabetes Screening 1-2 times per year if you're at risk for diabetes or have pre-diabetes Fasting glucose: 145 mg/dL (2/2/2024)  A1C: 6.6 % (8/25/2023)  Screening Current  Due for Blood Glucose    Cholesterol Screening Once every 5 years if you don't have a lipid disorder. May order more often based on risk factors. Lipid panel: 08/25/2023  Screening Not Indicated  History Lipid Disorder  Due for Lipid Panel      Other Preventive Screenings Covered by Medicare:  Abdominal Aortic Aneurysm (AAA) Screening: covered once if your at risk. You're considered to be at risk if you have a family history of AAA or a male between the age of 65-75 who smoking at least 100 cigarettes in your lifetime.  Lung Cancer Screening: covers low dose CT scan once per year if you meet all of the following conditions: (1) Age 55-77; (2) No signs or symptoms of lung cancer; (3) Current smoker or have quit smoking within the last 15 years; (4) You have a tobacco smoking history of at least 20 pack years (packs per day x number of years you smoked); (5) You get a written order from a healthcare provider.  Glaucoma Screening: covered annually if you're considered high risk: (1) You have diabetes OR (2) Family history of glaucoma OR (3)  aged 50 and older OR (4)  American aged 65 and older  Osteoporosis Screening: covered every 2 years if you meet one of the following conditions: (1) Have a vertebral abnormality; (2) On glucocorticoid therapy for more than 3 months; (3) Have primary hyperparathyroidism; (4) On osteoporosis medications and need to assess response to drug therapy.  HIV Screening: covered annually if you're between the age of 15-65. Also covered annually if you are younger than 15 and older than 65 with risk factors for HIV infection. For pregnant patients, it is covered up to 3 times per pregnancy.    Immunizations:  Immunization Recommendations   Influenza Vaccine Annual influenza vaccination during flu season is recommended for all persons aged >= 6 months who do not have contraindications   Pneumococcal Vaccine   * Pneumococcal conjugate vaccine = PCV13 (Prevnar 13), PCV15 (Vaxneuvance), PCV20  (Prevnar 20)  * Pneumococcal polysaccharide vaccine = PPSV23 (Pneumovax) Adults 19-65 yo with certain risk factors or if 65+ yo  If never received any pneumonia vaccine: recommend Prevnar 20 (PCV20)  Give PCV20 if previously received 1 dose of PCV13 or PPSV23   Hepatitis B Vaccine 3 dose series if at intermediate or high risk (ex: diabetes, end stage renal disease, liver disease)   Respiratory syncytial virus (RSV) Vaccine - COVERED BY MEDICARE PART D  * RSVPreF3 (Arexvy) CDC recommends that adults 60 years of age and older may receive a single dose of RSV vaccine using shared clinical decision-making (SCDM)   Tetanus (Td) Vaccine - COST NOT COVERED BY MEDICARE PART B Following completion of primary series, a booster dose should be given every 10 years to maintain immunity against tetanus. Td may also be given as tetanus wound prophylaxis.   Tdap Vaccine - COST NOT COVERED BY MEDICARE PART B Recommended at least once for all adults. For pregnant patients, recommended with each pregnancy.   Shingles Vaccine (Shingrix) - COST NOT COVERED BY MEDICARE PART B  2 shot series recommended in those 19 years and older who have or will have weakened immune systems or those 50 years and older     Health Maintenance Due:      Topic Date Due   • Colorectal Cancer Screening  09/17/2028   • Hepatitis C Screening  Completed     Immunizations Due:      Topic Date Due   • COVID-19 Vaccine (4 - 2023-24 season) 09/01/2023   • Influenza Vaccine (1) 09/01/2024     Advance Directives   What are advance directives?  Advance directives are legal documents that state your wishes and plans for medical care. These plans are made ahead of time in case you lose your ability to make decisions for yourself. Advance directives can apply to any medical decision, such as the treatments you want, and if you want to donate organs.   What are the types of advance directives?  There are many types of advance directives, and each state has rules about how  to use them. You may choose a combination of any of the following:  Living will:  This is a written record of the treatment you want. You can also choose which treatments you do not want, which to limit, and which to stop at a certain time. This includes surgery, medicine, IV fluid, and tube feedings.   Durable power of  for healthcare (DPAHC):  This is a written record that states who you want to make healthcare choices for you when you are unable to make them for yourself. This person, called a proxy, is usually a family member or a friend. You may choose more than 1 proxy.  Do not resuscitate (DNR) order:  A DNR order is used in case your heart stops beating or you stop breathing. It is a request not to have certain forms of treatment, such as CPR. A DNR order may be included in other types of advance directives.  Medical directive:  This covers the care that you want if you are in a coma, near death, or unable to make decisions for yourself. You can list the treatments you want for each condition. Treatment may include pain medicine, surgery, blood transfusions, dialysis, IV or tube feedings, and a ventilator (breathing machine).  Values history:  This document has questions about your views, beliefs, and how you feel and think about life. This information can help others choose the care that you would choose.  Why are advance directives important?  An advance directive helps you control your care. Although spoken wishes may be used, it is better to have your wishes written down. Spoken wishes can be misunderstood, or not followed. Treatments may be given even if you do not want them. An advance directive may make it easier for your family to make difficult choices about your care.   Weight Management   Why it is important to manage your weight:  Being overweight increases your risk of health conditions such as heart disease, high blood pressure, type 2 diabetes, and certain types of cancer. It can also  increase your risk for osteoarthritis, sleep apnea, and other respiratory problems. Aim for a slow, steady weight loss. Even a small amount of weight loss can lower your risk of health problems.  How to lose weight safely:  A safe and healthy way to lose weight is to eat fewer calories and get regular exercise. You can lose up about 1 pound a week by decreasing the number of calories you eat by 500 calories each day.   Healthy meal plan for weight management:  A healthy meal plan includes a variety of foods, contains fewer calories, and helps you stay healthy. A healthy meal plan includes the following:  Eat whole-grain foods more often.  A healthy meal plan should contain fiber. Fiber is the part of grains, fruits, and vegetables that is not broken down by your body. Whole-grain foods are healthy and provide extra fiber in your diet. Some examples of whole-grain foods are whole-wheat breads and pastas, oatmeal, brown rice, and bulgur.  Eat a variety of vegetables every day.  Include dark, leafy greens such as spinach, kale, michael greens, and mustard greens. Eat yellow and orange vegetables such as carrots, sweet potatoes, and winter squash.   Eat a variety of fruits every day.  Choose fresh or canned fruit (canned in its own juice or light syrup) instead of juice. Fruit juice has very little or no fiber.  Eat low-fat dairy foods.  Drink fat-free (skim) milk or 1% milk. Eat fat-free yogurt and low-fat cottage cheese. Try low-fat cheeses such as mozzarella and other reduced-fat cheeses.  Choose meat and other protein foods that are low in fat.  Choose beans or other legumes such as split peas or lentils. Choose fish, skinless poultry (chicken or turkey), or lean cuts of red meat (beef or pork). Before you cook meat or poultry, cut off any visible fat.   Use less fat and oil.  Try baking foods instead of frying them. Add less fat, such as margarine, sour cream, regular salad dressing and mayonnaise to foods. Eat  fewer high-fat foods. Some examples of high-fat foods include french fries, doughnuts, ice cream, and cakes.  Eat fewer sweets.  Limit foods and drinks that are high in sugar. This includes candy, cookies, regular soda, and sweetened drinks.  Exercise:  Exercise at least 30 minutes per day on most days of the week. Some examples of exercise include walking, biking, dancing, and swimming. You can also fit in more physical activity by taking the stairs instead of the elevator or parking farther away from stores. Ask your healthcare provider about the best exercise plan for you.      © Copyright [employer] 2018 Information is for End User's use only and may not be sold, redistributed or otherwise used for commercial purposes. All illustrations and images included in CareNotes® are the copyrighted property of A.[employer].A[employer]M., Inc. or [employer]

## 2024-07-16 NOTE — PROGRESS NOTES
Ambulatory Visit  Name: Luca Bowling      : 1955      MRN: [id number]  Encounter Provider: Pastora Boswell DO  Encounter Date: 2024   Encounter department: [facility]    Assessment & Plan   1. Viral URI  Assessment & Plan:  Rapid COVID (-). Benign lung exam, good oxygenation. No evidence of otitis/sinusitis/pharyngitis on exam. Symptoms likely viral in nature. Encouraged continued supportive care. To call if symptoms persist/worsen.   Orders:  -     benzonatate (TESSALON) 200 MG capsule; Take 1 capsule (200 mg total) by mouth 3 (three) times a day as needed for cough  2. Screening for viral disease  -     POCT Rapid Covid Ag  3. Medicare annual wellness visit, subsequent  4. Screening for prostate cancer  -     PSA, Total Screen; Future  5. Atrial flutter, unspecified type (HCC)  -     CBC and differential; Future  -     TSH, 3rd generation with Free T4 reflex; Future  6. Paroxysmal atrial fibrillation (HCC)  -     CBC and differential; Future  -     TSH, 3rd generation with Free T4 reflex; Future  7. Mixed hyperlipidemia  -     Lipid panel; Future  -     Comprehensive metabolic panel; Future  -     CBC and differential; Future  8. Impaired fasting glucose  -     Hemoglobin A1C; Future  -     CBC and differential; Future       Preventive health issues were discussed with patient, and age appropriate screening tests were ordered as noted in patient's After Visit Summary. Personalized health advice and appropriate referrals for health education or preventive services given if needed, as noted in patient's After Visit Summary.    History of Present Illness       Pt presents due to acute illness.   1 week history:   (+) sore throat, nasal congestion, post-nasal drip, fatigued, myalgias, sinus pressure, (+) ear fullness/muffled hearing, cough, chest tightness   (-) rhinorrhea, GI, fever   Taking Tessalon, Claritin, Flonase   Wife is sick as well        Patient Care Team:  Terell  MD Nile as PCP - General (Family Medicine)  GUILLERMO Fierro MD as Endoscopist  GUILLERMO Dudley as Nurse Practitioner (Nurse Practitioner)    Review of Systems   Constitutional:  Positive for fatigue.   HENT:  Positive for congestion, postnasal drip, sinus pressure and sore throat. Negative for ear pain (full) and rhinorrhea.    Respiratory:  Positive for cough and chest tightness.    Gastrointestinal:  Negative for abdominal pain, diarrhea and nausea.   Musculoskeletal:  Positive for myalgias.     Medical History Reviewed by provider this encounter:  Tobacco  Allergies  Meds  Problems  Med Hx  Surg Hx  Fam Hx       Annual Wellness Visit Questionnaire   Luca is here for his Subsequent Wellness visit.     Health Risk Assessment:   Patient rates overall health as good. Patient feels that their physical health rating is same. Patient is satisfied with their life. Eyesight was rated as slightly worse. Hearing was rated as same. Patient feels that their emotional and mental health rating is same. Patients states they are never, rarely angry. Patient states they are sometimes unusually tired/fatigued. Pain experienced in the last 7 days has been none. Patient states that he has experienced no weight loss or gain in last 6 months.     Depression Screening:   PHQ-2 Score: 0      Fall Risk Screening:   In the past year, patient has experienced: no history of falling in past year      Home Safety:  Patient does not have trouble with stairs inside or outside of their home. Patient has working smoke alarms and has working carbon monoxide detector. Home safety hazards include: none.     Nutrition:   Current diet is Frequent junk food.     Medications:   Patient is not currently taking any over-the-counter supplements. Patient is able to manage medications.     Activities of Daily Living (ADLs)/Instrumental Activities of Daily Living (IADLs):   Walk and transfer into and out of bed and  chair?: Yes  Dress and groom yourself?: Yes    Bathe or shower yourself?: Yes    Feed yourself? Yes  Do your laundry/housekeeping?: Yes  Manage your money, pay your bills and track your expenses?: Yes  Make your own meals?: Yes    Do your own shopping?: Yes    Durable Medical Equipment Suppliers  N/A    Previous Hospitalizations:   Any hospitalizations or ED visits within the last 12 months?: No      Advance Care Planning:   Living will: No    Durable POA for healthcare: No    Advanced directive: No      Comments:   Has paperwork at home     Cognitive Screening:   Provider or family/friend/caregiver concerned regarding cognition?: No    PREVENTIVE SCREENINGS      Cardiovascular Screening:    General: Screening Not Indicated and History Lipid Disorder    Due for: Lipid Panel      Diabetes Screening:     General: Screening Current    Due for: Blood Glucose      Colorectal Cancer Screening:     General: Screening Current      Prostate Cancer Screening:    General: Screening Current    Due for: PSA      Osteoporosis Screening:    General: Screening Not Indicated      Abdominal Aortic Aneurysm (AAA) Screening:    Risk factors include: age between 65-76 yo and tobacco use        Lung Cancer Screening:     General: Screening Not Indicated      Hepatitis C Screening:    General: Screening Current    Screening, Brief Intervention, and Referral to Treatment (SBIRT)    Screening  Typical number of drinks in a day: 1  Typical number of drinks in a week: 4  Interpretation: Low risk drinking behavior.    AUDIT-C Screenin) How often did you have a drink containing alcohol in the past year? never  2) How many drinks did you have on a typical day when you were drinking in the past year? 0  3) How often did you have 6 or more drinks on one occasion in the past year? never    AUDIT-C Score: 0  Interpretation: Score 0-3 (male): Negative screen for alcohol misuse    Single Item Drug Screening:  How often have you used an illegal  "drug (including marijuana) or a prescription medication for non-medical reasons in the past year? never    Single Item Drug Screen Score: 0  Interpretation: Negative screen for possible drug use disorder    Social Determinants of Health     Financial Resource Strain: Patient Declined (6/20/2023)    Overall Financial Resource Strain (CARDIA)    • Difficulty of Paying Living Expenses: Patient declined   Food Insecurity: No Food Insecurity (7/16/2024)    Hunger Vital Sign    • Worried About Running Out of Food in the Last Year: Never true    • Ran Out of Food in the Last Year: Never true   Transportation Needs: No Transportation Needs (7/16/2024)    PRAPARE - Transportation    • Lack of Transportation (Medical): No    • Lack of Transportation (Non-Medical): No   Housing Stability: Low Risk  (7/16/2024)    Housing Stability Vital Sign    • Unable to Pay for Housing in the Last Year: No    • Number of Times Moved in the Last Year: 1    • Homeless in the Last Year: No   Utilities: Not At Risk (7/16/2024)    [facility] Utilities    • Threatened with loss of utilities: No     No results found.    Objective     /70   Pulse 90   Temp 99.4 °F (37.4 °C)   Ht 5' 10\" (1.778 m)   Wt 105 kg (232 lb)   SpO2 95%   BMI 33.29 kg/m²     Physical Exam  Vitals and nursing note reviewed.   Constitutional:       General: He is not in acute distress.     Appearance: Normal appearance. He is well-developed.   HENT:      Head: Normocephalic and atraumatic.      Right Ear: Tympanic membrane, ear canal and external ear normal. Tympanic membrane is not erythematous, retracted or bulging.      Left Ear: Tympanic membrane, ear canal and external ear normal. Tympanic membrane is not erythematous, retracted or bulging.      Nose: Rhinorrhea present.      Right Sinus: No maxillary sinus tenderness or frontal sinus tenderness.      Left Sinus: No maxillary sinus tenderness or frontal sinus tenderness.      Mouth/Throat:      Mouth: Mucous " membranes are moist.      Pharynx: No oropharyngeal exudate or posterior oropharyngeal erythema.   Eyes:      Conjunctiva/sclera: Conjunctivae normal.   Neck:      Thyroid: No thyromegaly.   Cardiovascular:      Rate and Rhythm: Normal rate and regular rhythm.   Pulmonary:      Effort: Pulmonary effort is normal. No respiratory distress.      Breath sounds: Normal breath sounds. No wheezing, rhonchi or rales.   Lymphadenopathy:      Cervical: No cervical adenopathy.   Skin:     General: Skin is warm and dry.   Neurological:      Mental Status: He is alert.      Comments: Grossly intact   Psychiatric:         Mood and Affect: Mood normal.         TDap UTD, COVID completed primary + booster, Pneumo UTD, Shingles deferred

## 2024-07-16 NOTE — ASSESSMENT & PLAN NOTE
Rapid COVID (-). Benign lung exam, good oxygenation. No evidence of otitis/sinusitis/pharyngitis on exam. Symptoms likely viral in nature. Encouraged continued supportive care. To call if symptoms persist/worsen.

## 2024-07-24 ENCOUNTER — REMOTE DEVICE CLINIC VISIT (OUTPATIENT)
Dept: CARDIOLOGY CLINIC | Facility: CLINIC | Age: 69
End: 2024-07-24
Payer: MEDICARE

## 2024-07-24 DIAGNOSIS — I48.0 PAROXYSMAL ATRIAL FIBRILLATION (HCC): ICD-10-CM

## 2024-07-24 DIAGNOSIS — I49.1 PAC (PREMATURE ATRIAL CONTRACTION): Primary | ICD-10-CM

## 2024-07-24 PROCEDURE — 93298 REM INTERROG DEV EVAL SCRMS: CPT | Performed by: INTERNAL MEDICINE

## 2024-07-24 NOTE — PROGRESS NOTES
"[facility] [id number] ICM/ACTIVE SYSTEM IS MRI CONDITIONAL   MER[employer] TRANSMISSION: LOOP RECORDER. PRESENTING RYTHM SB @ 42 BPM. BATTERY STATUS \"OK.\" NO PATIENT OR DEVICE ACTIVATED EPISODES. NORMAL DEVICE FUNCTION. DL   "

## 2024-07-25 ENCOUNTER — HOSPITAL ENCOUNTER (OUTPATIENT)
Dept: ULTRASOUND IMAGING | Facility: CLINIC | Age: 69
End: 2024-07-25
Payer: MEDICARE

## 2024-07-25 DIAGNOSIS — R92.8 ABNORMAL ULTRASOUND OF BREAST: ICD-10-CM

## 2024-07-25 PROCEDURE — 76642 ULTRASOUND BREAST LIMITED: CPT

## 2024-08-15 PROBLEM — J06.9 VIRAL URI: Status: RESOLVED | Noted: 2024-07-16 | Resolved: 2024-08-15

## 2024-09-09 ENCOUNTER — APPOINTMENT (OUTPATIENT)
Dept: LAB | Facility: HOSPITAL | Age: 69
End: 2024-09-09
Payer: MEDICARE

## 2024-09-09 DIAGNOSIS — R73.01 IMPAIRED FASTING GLUCOSE: ICD-10-CM

## 2024-09-09 DIAGNOSIS — E78.2 MIXED HYPERLIPIDEMIA: ICD-10-CM

## 2024-09-09 DIAGNOSIS — I48.92 ATRIAL FLUTTER, UNSPECIFIED TYPE (HCC): ICD-10-CM

## 2024-09-09 DIAGNOSIS — Z12.5 SCREENING FOR PROSTATE CANCER: ICD-10-CM

## 2024-09-09 DIAGNOSIS — I48.0 PAROXYSMAL ATRIAL FIBRILLATION (HCC): ICD-10-CM

## 2024-09-09 LAB
ALBUMIN SERPL BCG-MCNC: 4.1 G/DL (ref 3.5–5)
ALP SERPL-CCNC: 63 U/L (ref 34–104)
ALT SERPL W P-5'-P-CCNC: 34 U/L (ref 7–52)
ANION GAP SERPL CALCULATED.3IONS-SCNC: 5 MMOL/L (ref 4–13)
AST SERPL W P-5'-P-CCNC: 20 U/L (ref 13–39)
BASOPHILS # BLD AUTO: 0.05 THOUSANDS/ÂΜL (ref 0–0.1)
BASOPHILS NFR BLD AUTO: 1 % (ref 0–1)
BILIRUB SERPL-MCNC: 0.94 MG/DL (ref 0.2–1)
BUN SERPL-MCNC: 13 MG/DL (ref 5–25)
CALCIUM SERPL-MCNC: 9.3 MG/DL (ref 8.4–10.2)
CHLORIDE SERPL-SCNC: 106 MMOL/L (ref 96–108)
CHOLEST SERPL-MCNC: 162 MG/DL
CO2 SERPL-SCNC: 30 MMOL/L (ref 21–32)
CREAT SERPL-MCNC: 1.01 MG/DL (ref 0.6–1.3)
EOSINOPHIL # BLD AUTO: 0.38 THOUSAND/ÂΜL (ref 0–0.61)
EOSINOPHIL NFR BLD AUTO: 5 % (ref 0–6)
ERYTHROCYTE [DISTWIDTH] IN BLOOD BY AUTOMATED COUNT: 12.7 % (ref 11.6–15.1)
GFR SERPL CREATININE-BSD FRML MDRD: 76 ML/MIN/1.73SQ M
GLUCOSE P FAST SERPL-MCNC: 144 MG/DL (ref 65–99)
HCT VFR BLD AUTO: 48.1 % (ref 36.5–49.3)
HDLC SERPL-MCNC: 43 MG/DL
HGB BLD-MCNC: 16.3 G/DL (ref 12–17)
IMM GRANULOCYTES # BLD AUTO: 0.04 THOUSAND/UL (ref 0–0.2)
IMM GRANULOCYTES NFR BLD AUTO: 1 % (ref 0–2)
LDLC SERPL CALC-MCNC: 92 MG/DL (ref 0–100)
LYMPHOCYTES # BLD AUTO: 1.61 THOUSANDS/ÂΜL (ref 0.6–4.47)
LYMPHOCYTES NFR BLD AUTO: 22 % (ref 14–44)
MCH RBC QN AUTO: 29.9 PG (ref 26.8–34.3)
MCHC RBC AUTO-ENTMCNC: 33.9 G/DL (ref 31.4–37.4)
MCV RBC AUTO: 88 FL (ref 82–98)
MONOCYTES # BLD AUTO: 0.76 THOUSAND/ÂΜL (ref 0.17–1.22)
MONOCYTES NFR BLD AUTO: 10 % (ref 4–12)
NEUTROPHILS # BLD AUTO: 4.46 THOUSANDS/ÂΜL (ref 1.85–7.62)
NEUTS SEG NFR BLD AUTO: 61 % (ref 43–75)
NONHDLC SERPL-MCNC: 119 MG/DL
NRBC BLD AUTO-RTO: 0 /100 WBCS
PLATELET # BLD AUTO: 207 THOUSANDS/UL (ref 149–390)
PMV BLD AUTO: 9.8 FL (ref 8.9–12.7)
POTASSIUM SERPL-SCNC: 4.4 MMOL/L (ref 3.5–5.3)
PROT SERPL-MCNC: 6.6 G/DL (ref 6.4–8.4)
PSA SERPL-MCNC: 2.51 NG/ML (ref 0–4)
RBC # BLD AUTO: 5.45 MILLION/UL (ref 3.88–5.62)
SODIUM SERPL-SCNC: 141 MMOL/L (ref 135–147)
TRIGL SERPL-MCNC: 137 MG/DL
TSH SERPL DL<=0.05 MIU/L-ACNC: 2.83 UIU/ML (ref 0.45–4.5)
WBC # BLD AUTO: 7.3 THOUSAND/UL (ref 4.31–10.16)

## 2024-09-09 PROCEDURE — 83036 HEMOGLOBIN GLYCOSYLATED A1C: CPT

## 2024-09-09 PROCEDURE — 84443 ASSAY THYROID STIM HORMONE: CPT

## 2024-09-09 PROCEDURE — 80053 COMPREHEN METABOLIC PANEL: CPT

## 2024-09-09 PROCEDURE — 85025 COMPLETE CBC W/AUTO DIFF WBC: CPT

## 2024-09-09 PROCEDURE — 36415 COLL VENOUS BLD VENIPUNCTURE: CPT

## 2024-09-09 PROCEDURE — 80061 LIPID PANEL: CPT

## 2024-09-09 PROCEDURE — G0103 PSA SCREENING: HCPCS

## 2024-09-10 LAB
EST. AVERAGE GLUCOSE BLD GHB EST-MCNC: 140 MG/DL
HBA1C MFR BLD: 6.5 %

## 2024-09-13 ENCOUNTER — OFFICE VISIT (OUTPATIENT)
Dept: UROLOGY | Facility: CLINIC | Age: 69
End: 2024-09-13
Payer: MEDICARE

## 2024-09-13 VITALS
RESPIRATION RATE: 16 BRPM | BODY MASS INDEX: 33.21 KG/M2 | TEMPERATURE: 97.6 F | DIASTOLIC BLOOD PRESSURE: 66 MMHG | OXYGEN SATURATION: 98 % | HEIGHT: 70 IN | HEART RATE: 76 BPM | SYSTOLIC BLOOD PRESSURE: 128 MMHG | WEIGHT: 232 LBS

## 2024-09-13 DIAGNOSIS — N40.0 BENIGN PROSTATIC HYPERPLASIA, UNSPECIFIED WHETHER LOWER URINARY TRACT SYMPTOMS PRESENT: Primary | ICD-10-CM

## 2024-09-13 DIAGNOSIS — Z12.5 PROSTATE CANCER SCREENING: ICD-10-CM

## 2024-09-13 PROCEDURE — 99213 OFFICE O/P EST LOW 20 MIN: CPT

## 2024-09-13 NOTE — PROGRESS NOTES
9/13/2024    Chief Complaint   Patient presents with    Follow-up     1 year visit       Assessment and Plan    69 y.o. male manage by AP Team    1.  BPH  Continue Silodosin 8 mg HS indefinitely.  Refills can be completed through patient's PCP.  He can follow-up on an as-needed basis.  As previously discussed, if his symptoms were to worsen we would be happy to see him back and I would recommend cystoscopy evaluation for possible outlet obstructive surgery.  Follow-up as needed    2.  Prostate cancer screening  PSA stable at 2.507 (9/9/2024)  PSA is stable.  He can follow-up on an as-needed basis.  I would recommend continued annual prostate cancer screening through his PCP open LDH is 75.  Should he have any acute rise in his PSA we would be happy to see him back for reevaluation.      Interval HPI:    He presents today reporting continuing to do well with Silodosin 8 mg HS. He does experience retrograde ejaculation which I reassured him is not harmful and is trying to conceive.  He is able to tolerate this side effect.  Otherwise doing well and offers no complaints.      History of Present Illness  Luca Bowling is a 69 y.o. male here for annual follow-up evaluation of BPH and prostate cancer screening.    Established patient initially seen by me in September 2023 at which time he reported that several months prior he started with lower urinary tract symptoms of weak urinary stream, urinary hesitancy, starting and stopping with urination. He was seen by his PCP and started on Flomax and saw immediate improvement in his symptoms. However, soon after starting Flomax he started to experience retrograde ejaculation and well as palpitations. His PCP switched him to Proscar, but also experience palpitations as well. He was then switched to silodosin and is currently taking 8 mg HS. At his initial office visit he reported satisfactory improvement with Silodosin and his PVR was 53 mL.  I recommended he continue with this  indefinitely.  We did discuss that if his symptoms would become worse we would need to schedule cystoscopy for evaluation for possible bladder outlet obstructive surgery.    Prostate cancer screening: Current PSA 2.507 as of 9/9/2024 previously 2.2 as of 8/25/2023 previously 2.4 on 6/29/2023 and 2.5 on 4/13/2022.             Review of Systems   Constitutional:  Negative for chills and fever.   HENT:  Negative for congestion and sore throat.    Respiratory:  Negative for cough and shortness of breath.    Cardiovascular:  Negative for chest pain and leg swelling.   Gastrointestinal:  Negative for abdominal pain, constipation and diarrhea.   Genitourinary:  Negative for difficulty urinating, dysuria, frequency, hematuria and urgency.   Musculoskeletal:  Negative for back pain and gait problem.   Skin:  Negative for wound.   Allergic/Immunologic: Negative for immunocompromised state.   Hematological:  Does not bruise/bleed easily.           AUA SYMPTOM SCORE      Flowsheet Row Most Recent Value   AUA SYMPTOM SCORE    How often have you had a sensation of not emptying your bladder completely after you finished urinating? 1 (P)     How often have you had to urinate again less than two hours after you finished urinating? 1 (P)     How often have you found you stopped and started again several times when you urinate? 1 (P)     How often have you found it difficult to postpone urination? 1 (P)     How often have you had a weak urinary stream? 0 (P)     How often have you had to push or strain to begin urination? 1 (P)     How many times did you most typically get up to urinate from the time you went to bed at night until the time you got up in the morning? 1 (P)     Quality of Life: If you were to spend the rest of your life with your urinary condition just the way it is now, how would you feel about that? 2 (P)     AUA SYMPTOM SCORE 6 (P)              Vitals  Vitals:    09/13/24 1045   BP: 128/66   Pulse: 76   Resp: 16  "  Temp: 97.6 °F (36.4 °C)   TempSrc: Tympanic   SpO2: 98%   Weight: 105 kg (232 lb)   Height: 5' 10\" (1.778 m)       Physical Exam  Vitals reviewed.   Constitutional:       General: He is not in acute distress.     Appearance: Normal appearance. He is not ill-appearing or toxic-appearing.   HENT:      Head: Normocephalic and atraumatic.   Eyes:      General: No scleral icterus.     Conjunctiva/sclera: Conjunctivae normal.   Cardiovascular:      Rate and Rhythm: Normal rate.   Pulmonary:      Effort: Pulmonary effort is normal. No respiratory distress.   Abdominal:      Tenderness: There is no right CVA tenderness or left CVA tenderness.      Hernia: No hernia is present.   Musculoskeletal:      Cervical back: Normal range of motion.      Right lower leg: No edema.      Left lower leg: No edema.   Skin:     General: Skin is warm and dry.      Coloration: Skin is not jaundiced or pale.   Neurological:      General: No focal deficit present.      Mental Status: He is alert and oriented to person, place, and time. Mental status is at baseline.      Gait: Gait normal.   Psychiatric:         Mood and Affect: Mood normal.         Behavior: Behavior normal.         Thought Content: Thought content normal.         Judgment: Judgment normal.         Past History  Past Medical History:   Diagnosis Date    Abnormal ECG 05/2021    Had fluttering of my heart    Basal cell carcinoma     Colon polyp     Folliculitis 03/01/2024    Developed after back shaved for ablation      GERD (gastroesophageal reflux disease)     Hyperlipidemia     Seasonal allergies     Squamous cell skin cancer 04/26/2021    Left neck SCCIS     Social History     Socioeconomic History    Marital status: /Civil Union     Spouse name: None    Number of children: None    Years of education: None    Highest education level: None   Occupational History    None   Tobacco Use    Smoking status: Former     Current packs/day: 0.00     Average packs/day: 1 " pack/day for 3.0 years (3.0 ttl pk-yrs)     Types: Cigarettes     Start date: 1970     Quit date: 1973     Years since quittin.7    Smokeless tobacco: Never   Vaping Use    Vaping status: Never Used   Substance and Sexual Activity    Alcohol use: Yes     Alcohol/week: 3.0 standard drinks of alcohol     Types: 3 Glasses of wine per week     Comment: Occ wine    Drug use: No    Sexual activity: Yes     Partners: Female     Birth control/protection: Female Sterilization   Other Topics Concern    None   Social History Narrative    None     Social Determinants of Health     Financial Resource Strain: Patient Declined (2023)    Overall Financial Resource Strain (CARDIA)     Difficulty of Paying Living Expenses: Patient declined   Food Insecurity: No Food Insecurity (2024)    Hunger Vital Sign     Worried About Running Out of Food in the Last Year: Never true     Ran Out of Food in the Last Year: Never true   Transportation Needs: No Transportation Needs (2024)    PRAPARE - Transportation     Lack of Transportation (Medical): No     Lack of Transportation (Non-Medical): No   Physical Activity: Not on file   Stress: Not on file   Social Connections: Not on file   Intimate Partner Violence: Not on file   Housing Stability: Low Risk  (2024)    Housing Stability Vital Sign     Unable to Pay for Housing in the Last Year: No     Number of Times Moved in the Last Year: 1     Homeless in the Last Year: No     Social History     Tobacco Use   Smoking Status Former    Current packs/day: 0.00    Average packs/day: 1 pack/day for 3.0 years (3.0 ttl pk-yrs)    Types: Cigarettes    Start date: 1970    Quit date: 1973    Years since quittin.7   Smokeless Tobacco Never     Family History   Problem Relation Age of Onset    Colon cancer Mother     Other Mother         septicemia    Hypertension Father     Heart attack Father         NSTEMI    Hypertension Sister     Squamous cell carcinoma Sister      Hypertension Brother         Has defibulator and pace maker    Cancer Brother     Basal cell carcinoma Brother        The following portions of the patient's history were reviewed and updated as appropriate allergies, current medications, past medical history, past social history, past surgical history and problem list    Imaging:    Results  No results found for this or any previous visit (from the past 1 hour(s)).]  Lab Results   Component Value Date    PSA 2.507 09/09/2024    PSA 2.2 08/25/2023    PSA 2.4 06/29/2023    PSA 2.5 04/13/2022     Lab Results   Component Value Date    GLUCOSE 107 (H) 12/12/2016    CALCIUM 9.3 09/09/2024     12/12/2016    K 4.4 09/09/2024    CO2 30 09/09/2024     09/09/2024    BUN 13 09/09/2024    CREATININE 1.01 09/09/2024     Lab Results   Component Value Date    WBC 7.30 09/09/2024    HGB 16.3 09/09/2024    HCT 48.1 09/09/2024    MCV 88 09/09/2024     09/09/2024       Please Note:  Voice dictation software has been used to create this document. There may be inadvertent transcriptions errors.     GUILLERMO Dudley 09/13/24

## 2024-10-09 DIAGNOSIS — R00.2 PALPITATION: ICD-10-CM

## 2024-10-09 DIAGNOSIS — I47.10 SVT (SUPRAVENTRICULAR TACHYCARDIA) (HCC): ICD-10-CM

## 2024-10-09 RX ORDER — METOPROLOL SUCCINATE 50 MG/1
TABLET, EXTENDED RELEASE ORAL
Qty: 90 TABLET | Refills: 1 | Status: SHIPPED | OUTPATIENT
Start: 2024-10-09

## 2024-10-22 ENCOUNTER — REMOTE DEVICE CLINIC VISIT (OUTPATIENT)
Dept: CARDIOLOGY CLINIC | Facility: CLINIC | Age: 69
End: 2024-10-22
Payer: MEDICARE

## 2024-10-22 DIAGNOSIS — I49.1 PAC (PREMATURE ATRIAL CONTRACTION): ICD-10-CM

## 2024-10-22 DIAGNOSIS — I48.0 PAROXYSMAL ATRIAL FIBRILLATION (HCC): Primary | ICD-10-CM

## 2024-10-22 PROCEDURE — 93298 REM INTERROG DEV EVAL SCRMS: CPT | Performed by: INTERNAL MEDICINE

## 2024-10-22 NOTE — PROGRESS NOTES
"[facility] [id number] ICM/ACTIVE SYSTEM IS MRI CONDITIONAL   [employer] TRANSMISSION: LOOP RECORDER. PRESENTING RHYTHM NSR @ 66 BPM. BATTERY STATUS \"OK.\" NO PATIENT OR DEVICE ACTIVATED EPISODES. NORMAL DEVICE FUNCTION. DL   "

## 2024-10-25 DIAGNOSIS — K21.9 GASTROESOPHAGEAL REFLUX DISEASE WITHOUT ESOPHAGITIS: ICD-10-CM

## 2024-10-25 RX ORDER — PANTOPRAZOLE SODIUM 40 MG/1
40 TABLET, DELAYED RELEASE ORAL 2 TIMES DAILY
Qty: 180 TABLET | Refills: 0 | Status: SHIPPED | OUTPATIENT
Start: 2024-10-25

## 2024-11-19 ENCOUNTER — OFFICE VISIT (OUTPATIENT)
Dept: CARDIOLOGY CLINIC | Facility: CLINIC | Age: 69
End: 2024-11-19
Payer: MEDICARE

## 2024-11-19 VITALS
DIASTOLIC BLOOD PRESSURE: 66 MMHG | SYSTOLIC BLOOD PRESSURE: 116 MMHG | WEIGHT: 233 LBS | HEIGHT: 70 IN | BODY MASS INDEX: 33.36 KG/M2 | HEART RATE: 68 BPM

## 2024-11-19 DIAGNOSIS — I49.1 PAC (PREMATURE ATRIAL CONTRACTION): ICD-10-CM

## 2024-11-19 DIAGNOSIS — I48.3 TYPICAL ATRIAL FLUTTER (HCC): ICD-10-CM

## 2024-11-19 DIAGNOSIS — I48.0 PAROXYSMAL ATRIAL FIBRILLATION (HCC): Primary | ICD-10-CM

## 2024-11-19 PROCEDURE — 93000 ELECTROCARDIOGRAM COMPLETE: CPT | Performed by: INTERNAL MEDICINE

## 2024-11-19 PROCEDURE — 99213 OFFICE O/P EST LOW 20 MIN: CPT | Performed by: INTERNAL MEDICINE

## 2024-11-27 NOTE — PROGRESS NOTES
Electrophysiology Office Note    Luca Bowling  1955  [id number]  HEART & VASCULAR [facility] CARDIOLOGY [facility]  [address] [place] [id number]    Assessment & Plan     Primary diagnosis:   Atrial fibrillation/flutter   Initially diagnosed in  after SVT, and A-fib found on Biotel  Started on baby aspirin for [id number] score of 1 as well as metoprolol for rate control  Seen by Dr. Lovett 2023 however wanted to avoid ablation  Increased palpitations 2023 agreed to undergo ablation  Status post PVI/CTI line 2024  Currently on Eliquis for stroke prevention post ablation, as well as flecainide for rhythm control  EKG here sinus rhythm w/ RBBB   Plan:  Stop  flecainide  GERD   BPH   4.  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    Rhythm History:   Atrial fibrillation:     Atrial flutter:     SVT:     VT/VF/PVC:     Device history:   Pacemaker:    Defibrillator:    BIV PPM:    BIV ICD:    ILR:      Cardiac Testing:     ECHO: Results for orders placed during the hospital encounter of 21    Echo complete with contrast if indicated    Narrative  [address] [address]    Transthoracic Echocardiogram  2D, M-mode, Doppler, and Color Doppler    Study date:  13-May-2021    Patient: LUCA BOWLING  MR number: [id number]  Account number: [id number]  : 1955  Age: 66 years  Gender: Male  Status: Outpatient  Location: [facility]  Height: 70 in  Weight: 225 lb  BP: 122/ 68 mmHg    Indications: Atrial Fibrillation    Sonographer:  Bob Ellis RDCS  Referring Physician:  Terell Dowd MD  Group:  [facility]  Interpreting Physician:  Davy Zarate D.O.    SUMMARY    LEFT VENTRICLE:  Systolic function was normal. Ejection fraction was estimated to be 65 %.  There were no regional wall motion abnormalities.    HISTORY: PRIOR  HISTORY: PAC's, Anxiety, Hyperlipidemia    PROCEDURE: The study was performed in the [facility]. This was a routine study. The transthoracic approach was used. The study included complete 2D imaging, M-mode, complete spectral Doppler, and color Doppler. The  heart rate was 78 bpm, at the start of the study. Images were obtained from the parasternal, apical, subcostal, and suprasternal notch acoustic windows. Image quality was adequate.    LEFT VENTRICLE: Size was normal. Systolic function was normal. Ejection fraction was estimated to be 65 %. There were no regional wall motion abnormalities. Wall thickness was normal. No evidence of apical thrombus. DOPPLER: Left  ventricular diastolic function parameters were normal.    RIGHT VENTRICLE: The size was normal. Systolic function was normal. Wall thickness was normal.    LEFT ATRIUM: Size was normal.    RIGHT ATRIUM: Size was normal.    MITRAL VALVE: Valve structure was normal. There was normal leaflet separation. DOPPLER: The transmitral velocity was within the normal range. There was no evidence for stenosis. There was no significant regurgitation.    AORTIC VALVE: The valve was trileaflet. Leaflets exhibited normal thickness and normal cuspal separation. DOPPLER: Transaortic velocity was within the normal range. There was no evidence for stenosis. There was no significant  regurgitation.    TRICUSPID VALVE: The valve structure was normal. There was normal leaflet separation. DOPPLER: The transtricuspid velocity was within the normal range. There was no evidence for stenosis. There was no significant regurgitation.    PULMONIC VALVE: Leaflets exhibited normal thickness, no calcification, and normal cuspal separation. DOPPLER: The transpulmonic velocity was within the normal range. There was no significant regurgitation.    PERICARDIUM: There was no pericardial effusion. The pericardium was normal in appearance.    AORTA: The root exhibited  normal size.    SYSTEMIC VEINS: IVC: The inferior vena cava was normal in size.    SYSTEM MEASUREMENT TABLES    2D  %FS: 43.29 %  Ao Diam: 3.11 cm  EDV(Teich): 89.74 ml  EF(Teich): 74.63 %  ESV(Teich): 22.77 ml  IVSd: 0.7 cm  LA Area: 20.42 cm2  LA Diam: 3.75 cm  LVEDV MOD A4C: 141.44 ml  LVEF MOD A4C: 70.78 %  LVESV MOD A4C: 41.32 ml  LVIDd: 4.44 cm  LVIDs: 2.52 cm  LVLd A4C: 8.72 cm  LVLs A4C: 7.29 cm  LVPWd: 0.81 cm  RA Area: 13.7 cm2  RVIDd: 3.54 cm  SV MOD A4C: 100.12 ml  SV(Teich): 66.97 ml    MM  TAPSE: 1.76 cm    PW  E' Sept: 0.09 m/s  E/E' Sept: 8.61  LVOT Env.Ti: 263.37 ms  LVOT VTI: 16.22 cm  LVOT Vmax: 0.87 m/s  LVOT Vmean: 0.62 m/s  LVOT maxPG: 3.11 mmHg  LVOT meanP.7 mmHg  MV A Lavon: 0.71 m/s  MV Dec Lares: 3.23 m/s2  MV DecT: 229.34 ms  MV E Lavon: 0.74 m/s  MV E/A Ratio: 1.05  MV PHT: 66.51 ms  MVA By PHT: 3.31 cm2  RVOT Env.Ti: 252.14 ms  RVOT VTI: 15.03 cm  RVOT Vmax: 0.91 m/s  RVOT Vmean: 0.61 m/s  RVOT maxPG: 3.32 mmHg  RVOT meanP.75 mmHg    [employer] Commission Accredited Echocardiography Laboratory    Prepared and electronically signed by    Davy Zarate D.O.  Signed 13-May-2021 14:34:01        History of Present Illness     HPI/INTERVAL HISTORY:  He is feeling well he offers no complaints.  No palpitations.        Per prior visit: Luca Bowling is a 69 yo male with a past medical history as mentioned above.  Patient was initially diagnosed with SVT, and atrial fibrillation and 2022 after complaining of palpitations and having Biotel monitor placed.  Patient was maintained on aspirin at that time as well as metoprolol.  His [id number] score was only 1 at that time.  He was seen by Dr. Lovett in 2022 and discussed options for atrial fibrillation however did not want to undergo any invasive procedures at that time.  He was seen by his general cardiologist in 2023 after having increase burden of palpitations.  He underwent atrial fibrillation ablation on  2024 with Dr. Tejeda with PVI as well as CTI line.  He was started on Eliquis after atrial fibrillation ablation as well as Protonix.  In terms of rhythm medication, he was started on flecainide 50 mg twice daily.  Since his ablation, he has been feeling well. He has not had any feelings of palpitations or other symptoms that are concerning for a fib.       Review of Systems   Constitutional:  Negative for fatigue and fever.   Respiratory:  Negative for choking and shortness of breath.    Cardiovascular:  Negative for chest pain and palpitations.   Neurological:  Negative for dizziness and light-headedness.   All other systems reviewed and are negative.    ROS as noted above, otherwise 12 point review of systems was performed and is negative.       Historical Information   Social History     Socioeconomic History    Marital status: /Civil Union     Spouse name: Not on file    Number of children: Not on file    Years of education: Not on file    Highest education level: Not on file   Occupational History    Not on file   Tobacco Use    Smoking status: Former     Current packs/day: 0.00     Average packs/day: 1 pack/day for 3.0 years (3.0 ttl pk-yrs)     Types: Cigarettes     Start date: 1970     Quit date:      Years since quittin.9    Smokeless tobacco: Never   Vaping Use    Vaping status: Never Used   Substance and Sexual Activity    Alcohol use: Yes     Alcohol/week: 3.0 standard drinks of alcohol     Types: 3 Glasses of wine per week     Comment: Occ wine    Drug use: No    Sexual activity: Yes     Partners: Female     Birth control/protection: Female Sterilization   Other Topics Concern    Not on file   Social History Narrative    Not on file     Social Drivers of Health     Financial Resource Strain: Patient Declined (2023)    Overall Financial Resource Strain (CARDIA)     Difficulty of Paying Living Expenses: Patient declined   Food Insecurity: No Food Insecurity (2024)     Nursing - Inadequate Food Risk Classification     Worried About Running Out of Food in the Last Year: Never true     Ran Out of Food in the Last Year: Never true     Ran Out of Food in the Last Year: Not on file   Transportation Needs: No Transportation Needs (7/16/2024)    PRAPARE - Transportation     Lack of Transportation (Medical): No     Lack of Transportation (Non-Medical): No   Physical Activity: Not on file   Stress: Not on file   Social Connections: Not on file   Intimate Partner Violence: Not on file   Housing Stability: Low Risk  (7/16/2024)    Housing Stability Vital Sign     Unable to Pay for Housing in the Last Year: No     Number of Times Moved in the Last Year: 1     Homeless in the Last Year: No     Past Medical History:   Diagnosis Date    Abnormal ECG 05/2021    Had fluttering of my heart    Basal cell carcinoma     Colon polyp     Folliculitis 03/01/2024    Developed after back shaved for ablation      GERD (gastroesophageal reflux disease)     Hyperlipidemia     Seasonal allergies     Squamous cell skin cancer 04/26/2021    Left neck SCCIS     Past Surgical History:   Procedure Laterality Date    CARDIAC ELECTROPHYSIOLOGY PROCEDURE N/A 2/2/2024    Procedure: Cardiac eps/afib ablation;  Surgeon: Chris Tejeda DO;  Location: [facility];  Service: Cardiology    CARDIAC ELECTROPHYSIOLOGY PROCEDURE N/A 2/2/2024    Procedure: Cardiac eps/aflutter ablation;  Surgeon: Chris Tejeda DO;  Location: [facility];  Service: Cardiology    CARDIAC ELECTROPHYSIOLOGY PROCEDURE N/A 4/3/2024    Procedure: Cardiac loop recorder implant;  Surgeon: David Ernst MD;  Location: [facility];  Service: Cardiology    CHOLECYSTECTOMY      COLONOSCOPY N/A 10/4/2017    Procedure: COLONOSCOPY;  Surgeon: Richie Panda MD;  Location: [facility];  Service: Gastroenterology    EAR SURGERY      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)    MOHS SURGERY  05/04/2021    left neck     ME  "ESOPHAGOGASTRODUODENOSCOPY TRANSORAL DIAGNOSTIC N/A 2018    Procedure: ESOPHAGOGASTRODUODENOSCOPY (EGD);  Surgeon: Richie Panda MD;  Location: [facility];  Service: Gastroenterology    SINUS SURGERY      SKIN BIOPSY       Social History     Substance and Sexual Activity   Alcohol Use Yes    Alcohol/week: 3.0 standard drinks of alcohol    Types: 3 Glasses of wine per week    Comment: Occ wine     Social History     Substance and Sexual Activity   Drug Use No     Social History     Tobacco Use   Smoking Status Former    Current packs/day: 0.00    Average packs/day: 1 pack/day for 3.0 years (3.0 ttl pk-yrs)    Types: Cigarettes    Start date: 1970    Quit date:     Years since quittin.9   Smokeless Tobacco Never     Family History   Problem Relation Age of Onset    Colon cancer Mother     Other Mother         septicemia    Hypertension Father     Heart attack Father         NSTEMI    Hypertension Sister     Squamous cell carcinoma Sister     Hypertension Brother         Has defibulator and pace maker    Cancer Brother     Basal cell carcinoma Brother        Meds/Allergies       Current Outpatient Medications:     ALPRAZolam (XANAX) 0.25 mg tablet, Take 1 tablet (0.25 mg total) by mouth daily as needed for anxiety, Disp: 30 tablet, Rfl: 0    atorvastatin (LIPITOR) 40 mg tablet, TAKE 1 TABLET DAILY, Disp: 90 tablet, Rfl: 3    fluticasone (FLONASE) 50 mcg/act nasal spray, SQUIRT 1 SPRAY INTO EACH NOSTRIL DAILY AS NEEDED, Disp: 48 mL, Rfl: 2    metoprolol succinate (TOPROL-XL) 50 mg 24 hr tablet, TAKE 1 TABLET DAILY, Disp: 90 tablet, Rfl: 1    pantoprazole (PROTONIX) 40 mg tablet, TAKE 1 TABLET TWICE A DAY, Disp: 180 tablet, Rfl: 0    Probiotic Product (Align) 4 MG CAPS, , Disp: , Rfl:     Silodosin 8 MG CAPS, TAKE 1 CAPSULE BY MOUTH EVERYDAY AT BEDTIME, Disp: 90 capsule, Rfl: 1    No Known Allergies    Objective   Vitals: Blood pressure 116/66, pulse 68, height 5' 10\" (1.778 m), weight 106 kg (233 " lb).        Physical Exam  Vitals and nursing note reviewed.   Constitutional:       General: He is not in acute distress.  Cardiovascular:      Rate and Rhythm: Normal rate and regular rhythm.   Pulmonary:      Effort: Pulmonary effort is normal.      Breath sounds: Normal breath sounds.   Musculoskeletal:      Right lower leg: No edema.      Left lower leg: No edema.   Skin:     General: Skin is warm and dry.   Neurological:      General: No focal deficit present.      Mental Status: He is alert and oriented to person, place, and time.   Psychiatric:         Mood and Affect: Mood normal.           Labs:  No visits with results within 2 Month(s) from this visit.   Latest known visit with results is:   Appointment on 09/09/2024   Component Date Value    PSA 09/09/2024 2.507     Hemoglobin A1C 09/09/2024 6.5 (H)     EAG 09/09/2024 140     Cholesterol 09/09/2024 162     Triglycerides 09/09/2024 137     HDL, Direct 09/09/2024 43     LDL Calculated 09/09/2024 92     Non-HDL-Chol (CHOL-HDL) 09/09/2024 119     Sodium 09/09/2024 141     Potassium 09/09/2024 4.4     Chloride 09/09/2024 106     CO2 09/09/2024 30     ANION GAP 09/09/2024 5     BUN 09/09/2024 13     Creatinine 09/09/2024 1.01     Glucose, Fasting 09/09/2024 144 (H)     Calcium 09/09/2024 9.3     AST 09/09/2024 20     ALT 09/09/2024 34     Alkaline Phosphatase 09/09/2024 63     Total Protein 09/09/2024 6.6     Albumin 09/09/2024 4.1     Total Bilirubin 09/09/2024 0.94     eGFR 09/09/2024 76     WBC 09/09/2024 7.30     RBC 09/09/2024 5.45     Hemoglobin 09/09/2024 16.3     Hematocrit 09/09/2024 48.1     MCV 09/09/2024 88     MCH 09/09/2024 29.9     MCHC 09/09/2024 33.9     RDW 09/09/2024 12.7     MPV 09/09/2024 9.8     Platelets 09/09/2024 207     nRBC 09/09/2024 0     Segmented % 09/09/2024 61     Immature Grans % 09/09/2024 1     Lymphocytes % 09/09/2024 22     Monocytes % 09/09/2024 10     Eosinophils Relative 09/09/2024 5     Basophils Relative 09/09/2024 1      Absolute Neutrophils 09/09/2024 4.46     Absolute Immature Grans 09/09/2024 0.04     Absolute Lymphocytes 09/09/2024 1.61     Absolute Monocytes 09/09/2024 0.76     Eosinophils Absolute 09/09/2024 0.38     Basophils Absolute 09/09/2024 0.05     TSH 3RD GENERATON 09/09/2024 2.825        Imaging: I have personally reviewed pertinent reports.

## 2024-12-01 DIAGNOSIS — N13.8 BPH WITH OBSTRUCTION/LOWER URINARY TRACT SYMPTOMS: ICD-10-CM

## 2024-12-01 DIAGNOSIS — N40.1 BPH WITH OBSTRUCTION/LOWER URINARY TRACT SYMPTOMS: ICD-10-CM

## 2024-12-02 RX ORDER — SILODOSIN 8 MG/1
1 CAPSULE ORAL
Qty: 30 CAPSULE | Refills: 5 | Status: SHIPPED | OUTPATIENT
Start: 2024-12-02

## 2025-02-04 ENCOUNTER — RESULTS FOLLOW-UP (OUTPATIENT)
Dept: NON INVASIVE DIAGNOSTICS | Facility: HOSPITAL | Age: 70
End: 2025-02-04

## 2025-02-04 ENCOUNTER — REMOTE DEVICE CLINIC VISIT (OUTPATIENT)
Dept: CARDIOLOGY CLINIC | Facility: CLINIC | Age: 70
End: 2025-02-04
Payer: MEDICARE

## 2025-02-04 DIAGNOSIS — I48.0 PAROXYSMAL ATRIAL FIBRILLATION (HCC): Primary | ICD-10-CM

## 2025-02-04 PROCEDURE — 93298 REM INTERROG DEV EVAL SCRMS: CPT | Performed by: STUDENT IN AN ORGANIZED HEALTH CARE EDUCATION/TRAINING PROGRAM

## 2025-02-04 NOTE — PROGRESS NOTES
"[facility] [id number] ICM/ACTIVE SYSTEM IS MRI CONDITIONAL   [employer] TRANSMISSION: LOOP RECORDER. PRESENTING RHYTHM NSR @ 70 BPM. BATTERY STATUS \"OK.\" NO PATIENT OR DEVICE ACTIVATED EPISODES. NORMAL DEVICE FUNCTION. DL   "

## 2025-02-28 DIAGNOSIS — K21.9 GASTROESOPHAGEAL REFLUX DISEASE WITHOUT ESOPHAGITIS: ICD-10-CM

## 2025-02-28 RX ORDER — PANTOPRAZOLE SODIUM 40 MG/1
40 TABLET, DELAYED RELEASE ORAL 2 TIMES DAILY
Qty: 180 TABLET | Refills: 0 | Status: SHIPPED | OUTPATIENT
Start: 2025-02-28

## 2025-03-19 ENCOUNTER — APPOINTMENT (OUTPATIENT)
Dept: RADIOLOGY | Facility: CLINIC | Age: 70
End: 2025-03-19
Payer: MEDICARE

## 2025-03-19 ENCOUNTER — OFFICE VISIT (OUTPATIENT)
Dept: FAMILY MEDICINE CLINIC | Facility: CLINIC | Age: 70
End: 2025-03-19
Payer: MEDICARE

## 2025-03-19 VITALS
OXYGEN SATURATION: 95 % | HEART RATE: 78 BPM | HEIGHT: 69 IN | SYSTOLIC BLOOD PRESSURE: 136 MMHG | BODY MASS INDEX: 35.07 KG/M2 | TEMPERATURE: 97.7 F | DIASTOLIC BLOOD PRESSURE: 80 MMHG | WEIGHT: 236.8 LBS

## 2025-03-19 DIAGNOSIS — S67.193A CRUSHING INJURY OF LEFT MIDDLE FINGER, INITIAL ENCOUNTER: ICD-10-CM

## 2025-03-19 DIAGNOSIS — R73.01 IMPAIRED FASTING GLUCOSE: ICD-10-CM

## 2025-03-19 DIAGNOSIS — S69.92XA INJURY OF MIDDLE FINGER, LEFT, INITIAL ENCOUNTER: Primary | ICD-10-CM

## 2025-03-19 DIAGNOSIS — I48.0 PAROXYSMAL ATRIAL FIBRILLATION (HCC): ICD-10-CM

## 2025-03-19 PROCEDURE — G2211 COMPLEX E/M VISIT ADD ON: HCPCS | Performed by: FAMILY MEDICINE

## 2025-03-19 PROCEDURE — 99214 OFFICE O/P EST MOD 30 MIN: CPT | Performed by: FAMILY MEDICINE

## 2025-03-19 PROCEDURE — 73130 X-RAY EXAM OF HAND: CPT

## 2025-03-19 NOTE — PROGRESS NOTES
"Name: Luca Bowling      : 1955      MRN: [id number]  Encounter Provider: Terell Dowd MD  Encounter Date: 3/19/2025   Encounter department: [facility]  :  Assessment & Plan  Injury of middle finger, left, initial encounter  Recommend OTC ibuprofen as needed  Orders:  •  XR hand 3+ vw left; Future  •  Ambulatory Referral to Orthopedic Surgery; Future    Paroxysmal atrial fibrillation (HCC)  No symptoms  Stable controlled       Impaired fasting glucose  Recommend a low carb diet  Orders:  •  Basic metabolic panel; Future  •  Hemoglobin A1C; Future    F/U with Hand surgeon for left finger injury  Follow up in 6 months or as needed       History of Present Illness   Patient is here due to an injury to his left middle finger that occurred after he picked up a bag of sugar with his left hand about 2 months ago. He felt a pop and felt immediate pain on his left middle finger. He has been having pain since. He has pain when he tried to make a fist and picking up objects with his left hand. He is left hand dominant.  Has a Hx of atrial fibrillation, S/P ablation. Denies any chest pain or palpitations related to this.  Also has borderline glucose denies any symptoms related to this.      Review of Systems   Constitutional:  Negative for activity change, appetite change, fatigue and fever.   HENT:  Negative for congestion and ear discharge.    Respiratory:  Negative for cough and shortness of breath.    Cardiovascular:  Negative for chest pain and palpitations.   Gastrointestinal:  Negative for diarrhea and nausea.   Musculoskeletal:  Positive for arthralgias, joint swelling and myalgias. Negative for back pain.   Skin:  Negative for color change and rash.   Neurological:  Negative for dizziness and headaches.   Psychiatric/Behavioral:  Negative for agitation and behavioral problems.        Objective   /80   Pulse 78   Temp 97.7 °F (36.5 °C)   Ht 5' 9\" (1.753 m)   Wt 107 kg (236 lb " 12.8 oz)   SpO2 95%   BMI 34.97 kg/m²      Physical Exam  Constitutional:       General: He is not in acute distress.     Appearance: He is well-developed. He is not diaphoretic.   Eyes:      General: No scleral icterus.     Pupils: Pupils are equal, round, and reactive to light.   Cardiovascular:      Rate and Rhythm: Normal rate and regular rhythm.      Heart sounds: Normal heart sounds. No murmur heard.  Pulmonary:      Effort: Pulmonary effort is normal. No respiratory distress.      Breath sounds: Normal breath sounds. No wheezing.   Abdominal:      General: Bowel sounds are normal. There is no distension.      Palpations: Abdomen is soft.      Tenderness: There is no abdominal tenderness.   Musculoskeletal:         General: Tenderness and signs of injury present.      Comments: Left middle finger DIP, PIP tenderness, tenderness on flexion of DIP and PIP joint.   Skin:     General: Skin is warm and dry.      Findings: No rash.   Neurological:      Mental Status: He is alert and oriented to person, place, and time.

## 2025-03-19 NOTE — ASSESSMENT & PLAN NOTE
Recommend OTC ibuprofen as needed  Orders:  •  XR hand 3+ vw left; Future  •  Ambulatory Referral to Orthopedic Surgery; Future

## 2025-03-19 NOTE — ASSESSMENT & PLAN NOTE
Recommend a low carb diet  Orders:  •  Basic metabolic panel; Future  •  Hemoglobin A1C; Future    F/U with Hand surgeon for left finger injury

## 2025-03-20 ENCOUNTER — RESULTS FOLLOW-UP (OUTPATIENT)
Dept: FAMILY MEDICINE CLINIC | Facility: CLINIC | Age: 70
End: 2025-03-20

## 2025-03-24 ENCOUNTER — OFFICE VISIT (OUTPATIENT)
Dept: OBGYN CLINIC | Facility: CLINIC | Age: 70
End: 2025-03-24
Payer: MEDICARE

## 2025-03-24 VITALS — BODY MASS INDEX: 34.96 KG/M2 | HEIGHT: 69 IN | WEIGHT: 236 LBS

## 2025-03-24 DIAGNOSIS — M65.332 TRIGGER FINGER, LEFT MIDDLE FINGER: ICD-10-CM

## 2025-03-24 PROCEDURE — 20550 NJX 1 TENDON SHEATH/LIGAMENT: CPT | Performed by: ORTHOPAEDIC SURGERY

## 2025-03-24 PROCEDURE — 99203 OFFICE O/P NEW LOW 30 MIN: CPT | Performed by: ORTHOPAEDIC SURGERY

## 2025-03-24 RX ORDER — BUPIVACAINE HYDROCHLORIDE 2.5 MG/ML
0.5 INJECTION, SOLUTION INFILTRATION; PERINEURAL
Status: COMPLETED | OUTPATIENT
Start: 2025-03-24 | End: 2025-03-24

## 2025-03-24 RX ORDER — BETAMETHASONE SODIUM PHOSPHATE AND BETAMETHASONE ACETATE 3; 3 MG/ML; MG/ML
3 INJECTION, SUSPENSION INTRA-ARTICULAR; INTRALESIONAL; INTRAMUSCULAR; SOFT TISSUE
Status: COMPLETED | OUTPATIENT
Start: 2025-03-24 | End: 2025-03-24

## 2025-03-24 RX ADMIN — BUPIVACAINE HYDROCHLORIDE 0.5 ML: 2.5 INJECTION, SOLUTION INFILTRATION; PERINEURAL at 10:00

## 2025-03-24 RX ADMIN — BETAMETHASONE SODIUM PHOSPHATE AND BETAMETHASONE ACETATE 3 MG: 3; 3 INJECTION, SUSPENSION INTRA-ARTICULAR; INTRALESIONAL; INTRAMUSCULAR; SOFT TISSUE at 10:00

## 2025-03-24 NOTE — PROGRESS NOTES
Assessment & Plan  Trigger finger, left middle finger    Orders:    Ambulatory Referral to Orthopedic Surgery    Hand/upper extremity injection  Reviewed physical exam and imaging with the patient at time of visit. The imaging does not show any signs of fracture or acute injury. Discussed with the patient that his exam is consistent with the onset of trigger finger of the middle finger. An injection(s) of celestone and marcaine was performed to his Left middle finger A1 pulley(s) for symptomatic relief of pain and inflammation. Patient tolerated the treatment(s) well. Ice and post injection protocol advised. Weightbearing activities as tolerated. He will be seen for follow-up 8 weeks for re-evaluation and consideration for repeat injections as necessary. Patient expresses understanding and is in agreement with this treatment plan. The patient was given the opportunity to ask questions or present concerns.    The patient has an early left middle trigger finger.  The A1 pulley was injected with Celestone and Marcaine.  He tolerated procedure quite well.  Return back in 3 months for reevaluation      Subjective:   Patient ID: Luca Bowling  1955     HPI  Patient is a 70 y.o. male who presents for initial evaluation of his left hand, middle finger. The patient states that approximately 1 month ago he attempted to grab a bag of sugar and he experienced a sharp pain in the middle finger. He states that the pain has persisted at the base of his middle finger. The patient reports pain with repetitive activities and gripping. He cannot recall any locking or triggering. He denies numbness or tingling.      The following portions of the patient's history were reviewed and updated as appropriate:  Past medical history, past surgical history, Family history, social history, current medications and allergies    Past Medical History:   Diagnosis Date    Abnormal ECG 05/2021    Had fluttering of my heart    Basal cell  carcinoma     Colon polyp     Folliculitis 03/01/2024    Developed after back shaved for ablation      GERD (gastroesophageal reflux disease)     Hyperlipidemia     Seasonal allergies     Squamous cell skin cancer 04/26/2021    Left neck SCCIS       Past Surgical History:   Procedure Laterality Date    CARDIAC ELECTROPHYSIOLOGY PROCEDURE N/A 2/2/2024    Procedure: Cardiac eps/afib ablation;  Surgeon: Chris Tejeda DO;  Location: [facility];  Service: Cardiology    CARDIAC ELECTROPHYSIOLOGY PROCEDURE N/A 2/2/2024    Procedure: Cardiac eps/aflutter ablation;  Surgeon: Chris Tejeda DO;  Location: [facility];  Service: Cardiology    CARDIAC ELECTROPHYSIOLOGY PROCEDURE N/A 4/3/2024    Procedure: Cardiac loop recorder implant;  Surgeon: David Ernst MD;  Location: [facility];  Service: Cardiology    CHOLECYSTECTOMY      COLONOSCOPY N/A 10/4/2017    Procedure: COLONOSCOPY;  Surgeon: Richie Panda MD;  Location: [facility];  Service: Gastroenterology    EAR SURGERY      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)    MOHS SURGERY  05/04/2021    left neck     KS ESOPHAGOGASTRODUODENOSCOPY TRANSORAL DIAGNOSTIC N/A 5/31/2018    Procedure: ESOPHAGOGASTRODUODENOSCOPY (EGD);  Surgeon: Richie Panda MD;  Location: [facility];  Service: Gastroenterology    SINUS SURGERY      SKIN BIOPSY         Family History   Problem Relation Age of Onset    Colon cancer Mother     Other Mother         septicemia    Hypertension Father     Heart attack Father         NSTEMI    Hypertension Sister     Squamous cell carcinoma Sister     Hypertension Brother         Has defibulator and pace maker    Cancer Brother     Basal cell carcinoma Brother        Social History     Socioeconomic History    Marital status: /Civil Union     Spouse name: None    Number of children: None    Years of education: None    Highest education level: None   Occupational History    None   Tobacco Use    Smoking status: Former      Current packs/day: 0.00     Average packs/day: 1 pack/day for 3.0 years (3.0 ttl pk-yrs)     Types: Cigarettes     Start date: 1970     Quit date: 1973     Years since quittin.2    Smokeless tobacco: Never   Vaping Use    Vaping status: Never Used   Substance and Sexual Activity    Alcohol use: Yes     Alcohol/week: 3.0 standard drinks of alcohol     Types: 3 Glasses of wine per week     Comment: Occ wine    Drug use: No    Sexual activity: Yes     Partners: Female     Birth control/protection: Female Sterilization   Other Topics Concern    None   Social History Narrative    None     Social Drivers of Health     Financial Resource Strain: Patient Declined (2023)    Overall Financial Resource Strain (CARDIA)     Difficulty of Paying Living Expenses: Patient declined   Food Insecurity: No Food Insecurity (2024)    Nursing - Inadequate Food Risk Classification     Worried About Running Out of Food in the Last Year: Never true     Ran Out of Food in the Last Year: Never true     Ran Out of Food in the Last Year: Not on file   Transportation Needs: No Transportation Needs (2024)    PRAPARE - Transportation     Lack of Transportation (Medical): No     Lack of Transportation (Non-Medical): No   Physical Activity: Not on file   Stress: Not on file   Social Connections: Not on file   Intimate Partner Violence: Not on file   Housing Stability: Low Risk  (2024)    Housing Stability Vital Sign     Unable to Pay for Housing in the Last Year: No     Number of Times Moved in the Last Year: 1     Homeless in the Last Year: No         Current Outpatient Medications:     ALPRAZolam (XANAX) 0.25 mg tablet, Take 1 tablet (0.25 mg total) by mouth daily as needed for anxiety, Disp: 30 tablet, Rfl: 0    atorvastatin (LIPITOR) 40 mg tablet, TAKE 1 TABLET DAILY, Disp: 90 tablet, Rfl: 3    fluticasone (FLONASE) 50 mcg/act nasal spray, SQUIRT 1 SPRAY INTO EACH NOSTRIL DAILY AS NEEDED, Disp: 48 mL, Rfl: 2     "metoprolol succinate (TOPROL-XL) 50 mg 24 hr tablet, TAKE 1 TABLET DAILY, Disp: 90 tablet, Rfl: 1    pantoprazole (PROTONIX) 40 mg tablet, TAKE 1 TABLET TWICE A DAY, Disp: 180 tablet, Rfl: 0    Probiotic Product (Align) 4 MG CAPS, , Disp: , Rfl:     Silodosin 8 MG CAPS, TAKE 1 CAPSULE BY MOUTH EVERYDAY AT BEDTIME, Disp: 30 capsule, Rfl: 5    No Known Allergies    Review of Systems   Constitutional:  Negative for chills and fever.   HENT:  Negative for ear pain and sore throat.    Eyes:  Negative for pain and visual disturbance.   Respiratory:  Negative for cough and shortness of breath.    Cardiovascular:  Negative for chest pain and palpitations.   Gastrointestinal:  Negative for abdominal pain and vomiting.   Genitourinary:  Negative for dysuria and hematuria.   Musculoskeletal:  Negative for arthralgias and back pain.   Skin:  Negative for color change and rash.   Neurological:  Negative for seizures and syncope.   All other systems reviewed and are negative.       Objective:  Ht 5' 9\" (1.753 m)   Wt 107 kg (236 lb)   BMI 34.85 kg/m²     Ortho Exam  left Hand -    Patient presents with no obvious anatomical deformity  Skin is warm and dry to touch with no signs of erythema, ecchymosis, or infection  No soft tissue swelling or effusion noted  Full FDS, FDP, extensor mechanisms are intact  No rotational deformity with composite finger flexion  TTP with palpable nodule in the middle finger flexor tendon local to A1 Pulley  No Reproducible triggering on exam  Demonstrates normal wrist, elbow, and shoulder motion  Forearm compartments are soft and supple  2+ distal radial pulse with brisk capillary refill to the fingers  Radial, median, and ulnar motor and sensory distribution intact  Sensations light to touch intact distally      Physical Exam  HENT:      Head: Normocephalic and atraumatic.      Nose: Nose normal.   Eyes:      Conjunctiva/sclera: Conjunctivae normal.   Cardiovascular:      Rate and Rhythm: Normal " rate.   Pulmonary:      Effort: Pulmonary effort is normal.   Musculoskeletal:      Cervical back: Neck supple.   Skin:     General: Skin is warm and dry.      Capillary Refill: Capillary refill takes less than 2 seconds.   Neurological:      Mental Status: He is alert and oriented to person, place, and time.   Psychiatric:         Mood and Affect: Mood normal.         Behavior: Behavior normal.          Diagnostic Test Review:  X-Ray of left hand obtained on 3/24/2025 were reviewed and demonstrate no acute osseous abnormalities.      Hand/upper extremity injection  Universal Protocol:  procedure performed by consultantConsent: Verbal consent obtained.  Risks and benefits: risks, benefits and alternatives were discussed  Consent given by: patient  Timeout called at: 3/24/2025 10:47 AM.  Patient understanding: patient states understanding of the procedure being performed  Patient consent: the patient's understanding of the procedure matches consent given  Site marked: the operative site was marked  Radiology Images displayed and confirmed. If images not available, report reviewed: imaging studies available  Patient identity confirmed: verbally with patient  Supporting Documentation  Indications: joint swelling and pain   Procedure Details  Condition:trigger finger Location: index finger -   Preparation: Patient was prepped and draped in the usual sterile fashion  Needle size: 25 G  Ultrasound guidance: no  Approach: dorsal  Medications administered: 0.5 mL bupivacaine 0.25 %; 3 mg betamethasone acetate-betamethasone sodium phosphate 6 (3-3) mg/mL  Patient tolerance: patient tolerated the procedure well with no immediate complications  Dressing:  Sterile dressing applied              Scribe Attestation      I,:  Kristin Levin am acting as a scribe while in the presence of the attending physician.:       I,:  Wayne Resendiz DO personally performed the services described in this documentation    as scribed in my  presence.:

## 2025-04-02 ENCOUNTER — APPOINTMENT (OUTPATIENT)
Dept: LAB | Facility: HOSPITAL | Age: 70
End: 2025-04-02
Payer: MEDICARE

## 2025-04-02 DIAGNOSIS — R73.01 IMPAIRED FASTING GLUCOSE: ICD-10-CM

## 2025-04-02 LAB
ANION GAP SERPL CALCULATED.3IONS-SCNC: 6 MMOL/L (ref 4–13)
BUN SERPL-MCNC: 16 MG/DL (ref 5–25)
CALCIUM SERPL-MCNC: 9.4 MG/DL (ref 8.4–10.2)
CHLORIDE SERPL-SCNC: 104 MMOL/L (ref 96–108)
CO2 SERPL-SCNC: 31 MMOL/L (ref 21–32)
CREAT SERPL-MCNC: 1.02 MG/DL (ref 0.6–1.3)
EST. AVERAGE GLUCOSE BLD GHB EST-MCNC: 169 MG/DL
GFR SERPL CREATININE-BSD FRML MDRD: 75 ML/MIN/1.73SQ M
GLUCOSE P FAST SERPL-MCNC: 163 MG/DL (ref 65–99)
HBA1C MFR BLD: 7.5 %
POTASSIUM SERPL-SCNC: 4.4 MMOL/L (ref 3.5–5.3)
SODIUM SERPL-SCNC: 141 MMOL/L (ref 135–147)

## 2025-04-02 PROCEDURE — 36415 COLL VENOUS BLD VENIPUNCTURE: CPT

## 2025-04-02 PROCEDURE — 83036 HEMOGLOBIN GLYCOSYLATED A1C: CPT

## 2025-04-02 PROCEDURE — 80048 BASIC METABOLIC PNL TOTAL CA: CPT

## 2025-04-04 ENCOUNTER — TELEPHONE (OUTPATIENT)
Dept: FAMILY MEDICINE CLINIC | Facility: CLINIC | Age: 70
End: 2025-04-04

## 2025-04-04 NOTE — TELEPHONE ENCOUNTER
Terell Dowd MD       4/4/25  1:34 PM  Result Note  His blood work shows evidence of worsened glucose compared to 6 months needs an appt to discuss glucose which evidence of diabetes   Unknown

## 2025-04-07 DIAGNOSIS — R00.2 PALPITATION: ICD-10-CM

## 2025-04-07 DIAGNOSIS — I47.10 SVT (SUPRAVENTRICULAR TACHYCARDIA) (HCC): ICD-10-CM

## 2025-04-07 RX ORDER — METOPROLOL SUCCINATE 50 MG/1
50 TABLET, EXTENDED RELEASE ORAL DAILY
Qty: 90 TABLET | Refills: 1 | Status: SHIPPED | OUTPATIENT
Start: 2025-04-07

## 2025-04-08 ENCOUNTER — RA CDI HCC (OUTPATIENT)
Dept: OTHER | Facility: HOSPITAL | Age: 70
End: 2025-04-08

## 2025-04-09 ENCOUNTER — OFFICE VISIT (OUTPATIENT)
Dept: FAMILY MEDICINE CLINIC | Facility: CLINIC | Age: 70
End: 2025-04-09
Payer: MEDICARE

## 2025-04-09 VITALS
BODY MASS INDEX: 34.9 KG/M2 | TEMPERATURE: 96.7 F | WEIGHT: 235.6 LBS | HEART RATE: 87 BPM | SYSTOLIC BLOOD PRESSURE: 144 MMHG | DIASTOLIC BLOOD PRESSURE: 80 MMHG | HEIGHT: 69 IN | OXYGEN SATURATION: 95 %

## 2025-04-09 DIAGNOSIS — E11.9 TYPE 2 DIABETES MELLITUS WITHOUT COMPLICATION, WITHOUT LONG-TERM CURRENT USE OF INSULIN (HCC): Primary | ICD-10-CM

## 2025-04-09 PROBLEM — R73.01 IMPAIRED FASTING GLUCOSE: Status: RESOLVED | Noted: 2019-04-15 | Resolved: 2025-04-09

## 2025-04-09 PROBLEM — R00.2 PALPITATION: Status: RESOLVED | Noted: 2021-04-22 | Resolved: 2025-04-09

## 2025-04-09 PROBLEM — R41.3 MEMORY DIFFICULTY: Status: RESOLVED | Noted: 2019-08-12 | Resolved: 2025-04-09

## 2025-04-09 PROBLEM — S67.193A CRUSHING INJURY OF LEFT MIDDLE FINGER: Status: RESOLVED | Noted: 2025-03-19 | Resolved: 2025-04-09

## 2025-04-09 PROBLEM — R46.89 BEHAVIORAL CHANGE: Status: RESOLVED | Noted: 2019-07-12 | Resolved: 2025-04-09

## 2025-04-09 PROCEDURE — 99213 OFFICE O/P EST LOW 20 MIN: CPT | Performed by: FAMILY MEDICINE

## 2025-04-09 PROCEDURE — G2211 COMPLEX E/M VISIT ADD ON: HCPCS | Performed by: FAMILY MEDICINE

## 2025-04-09 RX ORDER — LANCETS 33 GAUGE
EACH MISCELLANEOUS
Qty: 200 EACH | Refills: 3 | Status: SHIPPED | OUTPATIENT
Start: 2025-04-09

## 2025-04-09 RX ORDER — BLOOD-GLUCOSE METER
KIT MISCELLANEOUS
Qty: 1 KIT | Refills: 0 | Status: SHIPPED | OUTPATIENT
Start: 2025-04-09

## 2025-04-09 RX ORDER — BLOOD SUGAR DIAGNOSTIC
STRIP MISCELLANEOUS
Qty: 200 EACH | Refills: 3 | Status: SHIPPED | OUTPATIENT
Start: 2025-04-09

## 2025-04-09 NOTE — ASSESSMENT & PLAN NOTE
Lab Results   Component Value Date    HGBA1C 7.5 (H) 04/02/2025     After discussing with patient he does not want to start medications at this time and would like to follow a lifestyle modifications with diet and exercise  Orders:  •  Ambulatory Referral to Diabetic Education; Future  •  Blood Glucose Monitoring Suppl (OneTouch Verio Reflect) w/Device KIT; Check blood sugars twice daily. Please substitute with appropriate alternative as covered by patient's insurance. Dx: E11.65  •  glucose blood (OneTouch Verio) test strip; Check blood sugars twice daily. Please substitute with appropriate alternative as covered by patient's insurance. Dx: E11.65  •  OneTouch Delica Lancets 33G MISC; Check blood sugars twice daily. Please substitute with appropriate alternative as covered by patient's insurance. Dx: E11.65

## 2025-04-09 NOTE — PROGRESS NOTES
Name: Luca oBwling      : 1955      MRN: [id number]  Encounter Provider: Terell Dowd MD  Encounter Date: 2025   Encounter department: [facility]  :  Assessment & Plan  Type 2 diabetes mellitus without complication, without long-term current use of insulin ([facility])    Lab Results   Component Value Date    HGBA1C 7.5 (H) 2025     After discussing with patient he does not want to start medications at this time and would like to follow a lifestyle modifications with diet and exercise  Orders:  •  Ambulatory Referral to Diabetic Education; Future  •  Blood Glucose Monitoring Suppl (OneTouch Verio Reflect) w/Device KIT; Check blood sugars twice daily. Please substitute with appropriate alternative as covered by patient's insurance. Dx: E11.65  •  glucose blood (OneTouch Verio) test strip; Check blood sugars twice daily. Please substitute with appropriate alternative as covered by patient's insurance. Dx: E11.65  •  OneTouch Delica Lancets 33G MISC; Check blood sugars twice daily. Please substitute with appropriate alternative as covered by patient's insurance. Dx: E11.65    F/U in 3 months       History of Present Illness   Patient is here to follow up for blood work. His most recent blood work shows evidence of diabetes. He denies any symptoms related to this.        Review of Systems   Constitutional:  Negative for activity change, appetite change, fatigue and fever.   HENT:  Negative for congestion and ear discharge.    Respiratory:  Negative for cough and shortness of breath.    Cardiovascular:  Negative for chest pain and palpitations.   Gastrointestinal:  Negative for diarrhea and nausea.   Musculoskeletal:  Negative for arthralgias and back pain.   Skin:  Negative for color change and rash.   Neurological:  Negative for dizziness and headaches.   Psychiatric/Behavioral:  Negative for agitation and behavioral problems.        Objective   /80   Pulse 87   Temp (!)  "96.7 °F (35.9 °C)   Ht 5' 9\" (1.753 m)   Wt 107 kg (235 lb 9.6 oz)   SpO2 95%   BMI 34.79 kg/m²      Physical Exam  Constitutional:       General: He is not in acute distress.     Appearance: He is well-developed. He is not diaphoretic.   Eyes:      General: No scleral icterus.     Pupils: Pupils are equal, round, and reactive to light.   Cardiovascular:      Rate and Rhythm: Normal rate and regular rhythm.      Heart sounds: Normal heart sounds. No murmur heard.  Pulmonary:      Effort: Pulmonary effort is normal. No respiratory distress.      Breath sounds: Normal breath sounds. No wheezing.   Abdominal:      General: Bowel sounds are normal. There is no distension.      Palpations: Abdomen is soft.      Tenderness: There is no abdominal tenderness.   Skin:     General: Skin is warm and dry.      Findings: No rash.   Neurological:      Mental Status: He is alert and oriented to person, place, and time.         "

## 2025-04-11 ENCOUNTER — OFFICE VISIT (OUTPATIENT)
Dept: GASTROENTEROLOGY | Facility: CLINIC | Age: 70
End: 2025-04-11
Payer: MEDICARE

## 2025-04-11 VITALS
HEART RATE: 68 BPM | SYSTOLIC BLOOD PRESSURE: 124 MMHG | HEIGHT: 69 IN | WEIGHT: 232 LBS | OXYGEN SATURATION: 96 % | DIASTOLIC BLOOD PRESSURE: 80 MMHG | BODY MASS INDEX: 34.36 KG/M2

## 2025-04-11 DIAGNOSIS — K21.9 GASTROESOPHAGEAL REFLUX DISEASE, UNSPECIFIED WHETHER ESOPHAGITIS PRESENT: Primary | ICD-10-CM

## 2025-04-11 PROCEDURE — 99214 OFFICE O/P EST MOD 30 MIN: CPT | Performed by: INTERNAL MEDICINE

## 2025-04-11 NOTE — H&P (VIEW-ONLY)
"Name: Luca Bowling      : 1955      MRN: [id number]  Encounter Provider: Richie Panda MD  Encounter Date: 2025   Encounter department: [facility] [place]  :  Assessment & Plan  Gastroesophageal reflux disease, unspecified whether esophagitis present    Orders:  •  EGD; Future    Patient is advised to continue the Protonix but take it half hour before his morning and evening meal instead of afterwards like he is taking it now.  Patient is advised to follow-up with dietitian when he gets back and lose weight.  He has recently been diagnosed with diabetes  He is advised not to eat anywhere near bedtime and to avoid fatty and fried food.  Most of his issue appeared to occur by eating heavy desserts late at night  EGD will be performed to rule out Salguero's esophagus.  Further recommendations will depend on study results    History of Present Illness   HPI  Luca Bowling is a 70 y.o. male who presents with significant reflux.  Patient has been eating desserts late at night.  His last dessert was 8 to 9 PM.  Then he would lay down.  Most of the reflux occurred while laying down.  He would occasionally have to spend the night sitting in a chair.  He has been taking Protonix twice a day but he takes it 1/2-hour to an hour after his meals.  Last EGD was many years ago.  Patient is not having any dysphagia or odynophagia.  No melena or hematochezia.  He is up-to-date with colonoscopy.  He has no chest pain or shortness of breath.  He has gained weight and is recently been found to be diabetic.  He has an appointment when he gets back from vacation with dietary.  He would like to wait until then to institute any kind of diet.  He has no other significant GI complaints.      Review of Systems   All other systems reviewed and are negative.       Objective   /80   Pulse 68   Ht 5' 9\" (1.753 m)   Wt 105 kg (232 lb)   SpO2 96%   BMI 34.26 kg/m²      Physical " Exam  Constitutional:       Appearance: Normal appearance.   HENT:      Head: Normocephalic.   Eyes:      Pupils: Pupils are equal, round, and reactive to light.   Cardiovascular:      Rate and Rhythm: Normal rate and regular rhythm.      Pulses: Normal pulses.      Heart sounds: Normal heart sounds.   Pulmonary:      Effort: Pulmonary effort is normal.      Breath sounds: Normal breath sounds.   Abdominal:      General: Abdomen is flat. Bowel sounds are normal.      Palpations: Abdomen is soft.   Musculoskeletal:         General: Normal range of motion.      Cervical back: Neck supple.   Skin:     General: Skin is warm and dry.   Neurological:      General: No focal deficit present.      Mental Status: He is alert and oriented to person, place, and time.   Psychiatric:         Behavior: Behavior normal.

## 2025-04-11 NOTE — PROGRESS NOTES
"Name: Luca Bowling      : 1955      MRN: [id number]  Encounter Provider: Richie Panda MD  Encounter Date: 2025   Encounter department: [facility] [place]  :  Assessment & Plan  Gastroesophageal reflux disease, unspecified whether esophagitis present    Orders:  •  EGD; Future    Patient is advised to continue the Protonix but take it half hour before his morning and evening meal instead of afterwards like he is taking it now.  Patient is advised to follow-up with dietitian when he gets back and lose weight.  He has recently been diagnosed with diabetes  He is advised not to eat anywhere near bedtime and to avoid fatty and fried food.  Most of his issue appeared to occur by eating heavy desserts late at night  EGD will be performed to rule out Salguero's esophagus.  Further recommendations will depend on study results    History of Present Illness   HPI  Luca Bowling is a 70 y.o. male who presents with significant reflux.  Patient has been eating desserts late at night.  His last dessert was 8 to 9 PM.  Then he would lay down.  Most of the reflux occurred while laying down.  He would occasionally have to spend the night sitting in a chair.  He has been taking Protonix twice a day but he takes it 1/2-hour to an hour after his meals.  Last EGD was many years ago.  Patient is not having any dysphagia or odynophagia.  No melena or hematochezia.  He is up-to-date with colonoscopy.  He has no chest pain or shortness of breath.  He has gained weight and is recently been found to be diabetic.  He has an appointment when he gets back from vacation with dietary.  He would like to wait until then to institute any kind of diet.  He has no other significant GI complaints.      Review of Systems   All other systems reviewed and are negative.       Objective   /80   Pulse 68   Ht 5' 9\" (1.753 m)   Wt 105 kg (232 lb)   SpO2 96%   BMI 34.26 kg/m²      Physical " Exam  Constitutional:       Appearance: Normal appearance.   HENT:      Head: Normocephalic.   Eyes:      Pupils: Pupils are equal, round, and reactive to light.   Cardiovascular:      Rate and Rhythm: Normal rate and regular rhythm.      Pulses: Normal pulses.      Heart sounds: Normal heart sounds.   Pulmonary:      Effort: Pulmonary effort is normal.      Breath sounds: Normal breath sounds.   Abdominal:      General: Abdomen is flat. Bowel sounds are normal.      Palpations: Abdomen is soft.   Musculoskeletal:         General: Normal range of motion.      Cervical back: Neck supple.   Skin:     General: Skin is warm and dry.   Neurological:      General: No focal deficit present.      Mental Status: He is alert and oriented to person, place, and time.   Psychiatric:         Behavior: Behavior normal.

## 2025-04-11 NOTE — PATIENT INSTRUCTIONS
Scheduled date of EGD(as of today): 5/7/25  Physician performing EGD: Farzad  Location of EGD: [place]  Instructions reviewed with patient by: Roseanne KEEN  Clearances:

## 2025-04-14 DIAGNOSIS — E78.5 HYPERLIPIDEMIA, UNSPECIFIED HYPERLIPIDEMIA TYPE: ICD-10-CM

## 2025-04-15 RX ORDER — ATORVASTATIN CALCIUM 40 MG/1
40 TABLET, FILM COATED ORAL DAILY
Qty: 90 TABLET | Refills: 1 | Status: SHIPPED | OUTPATIENT
Start: 2025-04-15

## 2025-04-30 NOTE — PROGRESS NOTES
"  Carbohydrate Counting Instruction    Met with Luca Bowling for carbohydrate counting. Luca is currently on the following insulin regimen: none.  Luca would like to try managing his diabetes with lifestyle changes of diet and exercise before medication initiation.     Patient instructed on: Carbohydrate counting.  Instruction was provided using power point slides, food labels and an interactive carbohydrate counting activity.  Patient appeared to comprehend the materials presented at the visit.  Patient demonstrates good math skills and did well on the carbohydrate counting activity he completed at the visit.  Patient agreed to keep a 3 day food record and return it in one week for assessment.     Comments: Luca has good understanding of carbohydrate counting.  He reported that he is \"not good at math\" when discussing calculations for portion adjustment, but did well today when carb counting.    Diabetes Education Record: Luca was given the following education material: Portions Booklet and food record.       Patient response to instruction  Comprehension: good  Motivation: good  Expected Compliance: good  Readiness: preparation  Barriers to Learning: none known     Begin Time: 10:45 am  End Time: 11:50 am  Referring Provider: Terell Dowd MD    Thank you for referring your patient to [facility], it was a pleasure working with them today. Please feel free to call with any questions or concerns.    Kimberley Espinosa, RD  [address] [place] [id number] [phone]  "

## 2025-05-05 ENCOUNTER — TELEPHONE (OUTPATIENT)
Age: 70
End: 2025-05-05

## 2025-05-05 ENCOUNTER — REMOTE DEVICE CLINIC VISIT (OUTPATIENT)
Dept: CARDIOLOGY CLINIC | Facility: CLINIC | Age: 70
End: 2025-05-05
Payer: MEDICARE

## 2025-05-05 ENCOUNTER — RESULTS FOLLOW-UP (OUTPATIENT)
Dept: CARDIOLOGY CLINIC | Facility: CLINIC | Age: 70
End: 2025-05-05

## 2025-05-05 ENCOUNTER — CONSULT (OUTPATIENT)
Dept: DIABETES SERVICES | Facility: CLINIC | Age: 70
End: 2025-05-05
Attending: FAMILY MEDICINE
Payer: MEDICARE

## 2025-05-05 VITALS — BODY MASS INDEX: 34.35 KG/M2 | WEIGHT: 232.6 LBS

## 2025-05-05 DIAGNOSIS — I48.0 PAROXYSMAL ATRIAL FIBRILLATION (HCC): Primary | ICD-10-CM

## 2025-05-05 DIAGNOSIS — E11.9 TYPE 2 DIABETES MELLITUS WITHOUT COMPLICATION, WITHOUT LONG-TERM CURRENT USE OF INSULIN (HCC): Primary | ICD-10-CM

## 2025-05-05 PROCEDURE — 93298 REM INTERROG DEV EVAL SCRMS: CPT | Performed by: INTERNAL MEDICINE

## 2025-05-05 PROCEDURE — G0108 DIAB MANAGE TRN  PER INDIV: HCPCS

## 2025-05-05 NOTE — PATIENT INSTRUCTIONS
Complete 3-day food log and return completed log in 1 week to [email or web address]

## 2025-05-05 NOTE — TELEPHONE ENCOUNTER
Patient states that [employer] will be faxing over some forms for the doctor to fill out so patient will be able to get his Glucose Monitor. Please look out for forms from [employer]

## 2025-05-05 NOTE — PROGRESS NOTES
"[facility] [id number] ICM/ACTIVE SYSTEM IS MRI CONDITIONAL   MERLIN TRANSMISSION: LOOP RECORDER. PRESENTING RHYTHM NSR @ 64 BPM. BATTERY STATUS \"OK.\" NO PATIENT OR DEVICE ACTIVATED EPISODES. NORMAL DEVICE FUNCTION. DL   "

## 2025-05-06 RX ORDER — SODIUM CHLORIDE, SODIUM LACTATE, POTASSIUM CHLORIDE, CALCIUM CHLORIDE 600; 310; 30; 20 MG/100ML; MG/100ML; MG/100ML; MG/100ML
20 INJECTION, SOLUTION INTRAVENOUS CONTINUOUS
Status: CANCELLED | OUTPATIENT
Start: 2025-05-06

## 2025-05-07 ENCOUNTER — ANESTHESIA EVENT (OUTPATIENT)
Dept: GASTROENTEROLOGY | Facility: HOSPITAL | Age: 70
End: 2025-05-07
Payer: MEDICARE

## 2025-05-07 ENCOUNTER — HOSPITAL ENCOUNTER (OUTPATIENT)
Dept: GASTROENTEROLOGY | Facility: HOSPITAL | Age: 70
Setting detail: OUTPATIENT SURGERY
Discharge: HOME | End: 2025-05-07
Attending: INTERNAL MEDICINE
Payer: MEDICARE

## 2025-05-07 ENCOUNTER — ANESTHESIA (OUTPATIENT)
Dept: GASTROENTEROLOGY | Facility: HOSPITAL | Age: 70
End: 2025-05-07
Payer: MEDICARE

## 2025-05-07 VITALS
TEMPERATURE: 97.5 F | HEIGHT: 70 IN | WEIGHT: 231.04 LBS | RESPIRATION RATE: 17 BRPM | DIASTOLIC BLOOD PRESSURE: 68 MMHG | HEART RATE: 63 BPM | SYSTOLIC BLOOD PRESSURE: 127 MMHG | BODY MASS INDEX: 33.08 KG/M2 | OXYGEN SATURATION: 94 %

## 2025-05-07 DIAGNOSIS — K21.9 GASTROESOPHAGEAL REFLUX DISEASE, UNSPECIFIED WHETHER ESOPHAGITIS PRESENT: ICD-10-CM

## 2025-05-07 LAB — GLUCOSE SERPL-MCNC: 128 MG/DL (ref 65–140)

## 2025-05-07 PROCEDURE — 88305 TISSUE EXAM BY PATHOLOGIST: CPT | Performed by: STUDENT IN AN ORGANIZED HEALTH CARE EDUCATION/TRAINING PROGRAM

## 2025-05-07 PROCEDURE — 82948 REAGENT STRIP/BLOOD GLUCOSE: CPT

## 2025-05-07 PROCEDURE — 43239 EGD BIOPSY SINGLE/MULTIPLE: CPT | Performed by: INTERNAL MEDICINE

## 2025-05-07 RX ORDER — SODIUM CHLORIDE, SODIUM LACTATE, POTASSIUM CHLORIDE, CALCIUM CHLORIDE 600; 310; 30; 20 MG/100ML; MG/100ML; MG/100ML; MG/100ML
50 INJECTION, SOLUTION INTRAVENOUS CONTINUOUS
Status: DISCONTINUED | OUTPATIENT
Start: 2025-05-07 | End: 2025-05-11 | Stop reason: HOSPADM

## 2025-05-07 RX ORDER — LIDOCAINE HYDROCHLORIDE 20 MG/ML
INJECTION, SOLUTION EPIDURAL; INFILTRATION; INTRACAUDAL; PERINEURAL AS NEEDED
Status: DISCONTINUED | OUTPATIENT
Start: 2025-05-07 | End: 2025-05-07

## 2025-05-07 RX ORDER — PROPOFOL 10 MG/ML
INJECTION, EMULSION INTRAVENOUS AS NEEDED
Status: DISCONTINUED | OUTPATIENT
Start: 2025-05-07 | End: 2025-05-07

## 2025-05-07 RX ADMIN — PROPOFOL 50 MG: 10 INJECTION, EMULSION INTRAVENOUS at 08:06

## 2025-05-07 RX ADMIN — SODIUM CHLORIDE, SODIUM LACTATE, POTASSIUM CHLORIDE, AND CALCIUM CHLORIDE 50 ML/HR: .6; .31; .03; .02 INJECTION, SOLUTION INTRAVENOUS at 07:05

## 2025-05-07 RX ADMIN — PROPOFOL 150 MG: 10 INJECTION, EMULSION INTRAVENOUS at 08:04

## 2025-05-07 RX ADMIN — LIDOCAINE HYDROCHLORIDE 100 MG: 20 INJECTION, SOLUTION EPIDURAL; INFILTRATION; INTRACAUDAL; PERINEURAL at 08:04

## 2025-05-07 NOTE — INTERVAL H&P NOTE
H&P reviewed. After examining the patient I find no changes in the patients condition since the H&P had been written.    Vitals:    05/07/25 0659   BP: 163/87   Pulse: 69   Resp: 17   Temp: 98.3 °F (36.8 °C)   SpO2: 95%

## 2025-05-07 NOTE — ANESTHESIA PREPROCEDURE EVALUATION
"Procedure:  EGD    Relevant Problems   ANESTHESIA (within normal limits)  Patient reports being a \"light-weight\"      CARDIO  No episodes of Afib/flutter since ablation.  Has loop recorder in place   (+) Atrial fibrillation (HCC)   (+) Atrial flutter (HCC)   (+) Hyperlipidemia   (+) PAC (premature atrial contraction)      ENDO   (+) Type 2 diabetes mellitus without complication, without long-term current use of insulin (HCC)      GI/HEPATIC   (+) Gastroesophageal reflux disease      /RENAL   (+) BPH with obstruction/lower urinary tract symptoms      HEMATOLOGY (within normal limits)      NEURO/PSYCH   (+) Anxiety      PULMONARY   (-) URI (upper respiratory infection)      Other   (+) Lymphadenopathy, axillary        Physical Exam    Airway    Mallampati score: III  TM Distance: >3 FB  Neck ROM: full     Dental   No notable dental hx     Cardiovascular  Rhythm: regular, Rate: normal    Pulmonary   Breath sounds clear to auscultation    Other Findings        Anesthesia Plan  ASA Score- 2     Anesthesia Type- IV sedation with anesthesia with ASA Monitors.         Additional Monitors:     Airway Plan:     Comment: I discussed the risks and benefits of IV sedation anesthesia including the possibility of the need to convert to general anesthesia and the potential risk of awareness.  The patient was given the opportunity to ask questions, which were answered..       Plan Factors-Exercise tolerance (METS): >4 METS.    Chart reviewed. EKG reviewed.       Patient is not a current smoker.              Induction- intravenous.    Postoperative Plan-         Informed Consent- Anesthetic plan and risks discussed with patient and spouse.  I personally reviewed this patient with the CRNA. Discussed and agreed on the Anesthesia Plan with the CRNA..      NPO Status:  Vitals Value Taken Time   Date of last liquid 05/07/25 05/07/25 0658   Time of last liquid 0630 05/07/25 0658   Date of last solid 05/06/25 05/07/25 0658   Time of last " solid 2000 05/07/25 0662

## 2025-05-07 NOTE — ANESTHESIA POSTPROCEDURE EVALUATION
Post-Op Assessment Note    CV Status:  Stable  Pain Score: 0    Pain management: adequate       Mental Status:  Sleepy   Hydration Status:  Euvolemic   PONV Controlled:  Controlled   Airway Patency:  Patent     Post Op Vitals Reviewed: Yes    No anethesia notable event occurred.    Staff: CRNA           Last Filed PACU Vitals:  Vitals Value Taken Time   Temp 97.5 °F (36.4 °C) 05/07/25 0813   Pulse 66 05/07/25 0813   /60 05/07/25 0813   Resp 16 05/07/25 0813   SpO2 93 % 05/07/25 0813       Modified Vanessa:     Vitals Value Taken Time   Activity 0 05/07/25 0811   Respiration 2 05/07/25 0811   Circulation 1 05/07/25 0811   Consciousness 0 05/07/25 0811   Oxygen Saturation 2 05/07/25 0811     Modified Vanessa Score: 5

## 2025-05-08 NOTE — PROGRESS NOTES
"Medical Nutrition Therapy        Assessment    Visit Type: Initial visit  Chief complaint/Medical Diagnosis/reason for visit E11.9    HPI Luca was seen in person for the initial MNT appointment, and previously seen for a carb counting session. BG levels are not checked yet. He just got his glucometer supplies and plans to come in for training. Patient does take diabetes medication as prescribed.     Patient reported a current exercise regimen of walking every other day for 20-30 minutes. Encouraged Luca to continue exercise and increase duration as able.    Problems identified in food recall include meal skipping, inconsistent carbohydrate intake, high-fat and high-sodium convenience foods, high-fat dairy, limited non-starchy vegetables and whole grains, sugary beverages, and sweets. Consumption of carbohydrates ranges from 40 to ~130 grams per meal. Explained basic pathophysiology of diabetes and impact of diet on blood glucose levels and disease complications. Explained how sodium influences volume retention, blood pressure, and complications for heart disease, stroke, and CKD.     Provided patient with a 1675 calorie meal plan to assist with consistency, balance and portion control.  Encouraged the consumption of regular meals at regular times.  Advised patient to keep carbohydrate intake to 30-45 grams per meal and 15 per snack to assist with glycemic control.  Suggested keeping protein intake to 8 ounces a day and fat to 5 servings daily to assist with lipid management and calorie control. Portion booklet and food labels were used to teach basic carbohydrate counting. Patient agreed to keep daily food logs and return them in 2 months for assessment. RD will remain available for further dietary questions/concerns.     Ht Readings from Last 1 Encounters:   05/07/25 5' 10\" (1.778 m)     Wt Readings from Last 3 Encounters:   05/07/25 105 kg (231 lb 0.7 oz)   05/05/25 106 kg (232 lb 9.6 oz)   04/29/25 105 kg " (232 lb)     Weight Change: No    Barriers to Learning: no barriers    Do you follow any special diet presently?: No  Who shops: spouse  Who cooks: spouse    Food Log: Completed via the method of food recall and also a food log. (Please see uploaded log)    Wakes up 8:30 am  Beverages: diet soda, water (SF flavored), wine  Eating out/Take out:3x/ week; 1 slice pizza and sometimes with salad or wings OR 5 inch turkey hoagie with diet soda  Exercise walks every other day for 20-30 minutes    Calorie needs 1675 kcals/day Carbs: 30-45 g/meal, 15 g/snack     Protein:8ounces/day    Fat: 5 servings/day        Nutrition Diagnosis:  Food and nutrition related knowledge deficit  related to Lack of prior exposure to accurate nutrition related information as evidenced by Verbalizes inaccurate or incomplete information    Intervention: plate method, increased fiber intake, label reading, carbohydrate counting, meal timing, meal planning, monitoring portion control, exercise guidelines, and food diary     Treatment Goals: Patient understands education and recommendations, Patient will monitor food intake daily with tracker, Patient will consume 3 meals a day, Patient will monitor portion control, Patient will count carbohydrates, Patient will exercise, and Patient will monitor blood glucose    Monitoring and evaluation:    Term code indicator  FH 1.3.2 Food Intake Criteria: Eat 3 meals per day, 4-5 hours apart. No meal skipping. Eat your snack 2-3 hours away from your meals.  Term code indicator  FH 1.6.3 Carbohydrate Intake Criteria: Eat 30-45 grams of carbohydrate per meal, and no more than 15 grams per snack.  Term code indicator  CH 2.2 Treatments/Therapy/Alternative Medicine Criteria: Continue regular exercise to build to at least 30 minutes per day or 150 minutes of moderate exercise per week, and 2 days per week of resistance/strength training, pending physician approval.    Materials Provided: portion book, 3-day food  log    Patient’s Response to Instruction:  Comprehensiongood  Motivationgood  Expected Compliancegood    Begin Time: 1:00 pm  End Time: 2:15 pm  Referring Provider: Terell Dowd MD    Thank you for coming to the [facility] for education today.  Please feel free to call with any questions or concerns.    Kimberley Espinosa, RD  [address]  [place] [address]  [phone]

## 2025-05-09 ENCOUNTER — TELEPHONE (OUTPATIENT)
Dept: DIABETES SERVICES | Facility: CLINIC | Age: 70
End: 2025-05-09

## 2025-05-09 ENCOUNTER — TELEPHONE (OUTPATIENT)
Age: 70
End: 2025-05-09

## 2025-05-13 ENCOUNTER — TELEPHONE (OUTPATIENT)
Dept: DIABETES SERVICES | Facility: CLINIC | Age: 70
End: 2025-05-13

## 2025-05-13 ENCOUNTER — OFFICE VISIT (OUTPATIENT)
Dept: DIABETES SERVICES | Facility: CLINIC | Age: 70
End: 2025-05-13
Payer: MEDICARE

## 2025-05-13 VITALS — WEIGHT: 232.2 LBS | BODY MASS INDEX: 33.32 KG/M2

## 2025-05-13 DIAGNOSIS — E11.9 TYPE 2 DIABETES MELLITUS WITHOUT COMPLICATION, WITHOUT LONG-TERM CURRENT USE OF INSULIN (HCC): Primary | ICD-10-CM

## 2025-05-13 PROCEDURE — 88305 TISSUE EXAM BY PATHOLOGIST: CPT | Performed by: STUDENT IN AN ORGANIZED HEALTH CARE EDUCATION/TRAINING PROGRAM

## 2025-05-13 PROCEDURE — 97802 MEDICAL NUTRITION INDIV IN: CPT

## 2025-05-13 NOTE — TELEPHONE ENCOUNTER
----- Message from Kimberley FOSTER sent at 5/13/2025  2:17 PM EDT -----  Regarding: please schedule  Please call to set up a Glucometer teaching appt. Thank you!

## 2025-05-13 NOTE — PATIENT INSTRUCTIONS
Eat 3 meals per day, 4-5 hours apart. No meal skipping. Eat your snack 2-3 hours away from your meals.    Eat 30-45 grams of carbohydrate per meal, and no more than 15 grams per snack.    Continue regular exercise to build to at least 30 minutes per day or 150 minutes of moderate exercise per week, and 2 days per week of resistance/strength training, pending physician approval.    Complete 3-day food log and return completed log at the follow up appointment     It is important for you to show up for 15 minutes prior to your scheduled appointment. It often times takes 15 minutes for the nurse to go through medications, get vitals, and perform other check in processes. Coming early allows you to have the appropriately scheduled time with your physician. If you are late for your appointment you likely will be asked to reschedule.       Medicare Wellness Visit  Plan for Preventive Care    A good way for you to stay healthy is to use preventive care.  Medicare covers many services that can help you stay healthy.* The goal of these services is to find any health problems as quickly as possible. Finding problems early can help make them easier to treat.  Your personal plan below lists the services you may need and when they are due.     Health Maintenance Summary     Topic Due On Due Status Completed On    Diabetes Eye Exam Mar 1, 2018 Overdue Mar 1, 2017    Glycohemoglobin A1C  (Diabetes Sugar)  Jul 12, 2018 Overdue Apr 12, 2018    Microalbumin  (Diabetes Urine Test)  Jul 26, 2017 Overdue Jul 26, 2016    GFR  (Kidney Function Test)  Jun 21, 2019 Not Due Jun 21, 2018    Diabetes Foot Exam  Jul 28, 2017 Overdue Jul 28, 2016    Medicare Wellness Visit Jul 28, 2017 Overdue Jul 28, 2016    IMMUNIZATION - DTaP/Tdap/Td Jun 1, 2005 Overdue May 31, 2005    Immunization-Influenza Sep 1, 2018 Not Due Oct 1, 2017    Pneumococcal Vaccine 65+ Low/Medium Risk  Completed Dec 14, 2017           Preventive Care for Women and Men    Heart Screenings (Cardiovascular):  · Blood tests are used to check your cholesterol, lipid and triglyceride levels. High levels can increase your risk for heart disease and stroke. High levels can be treated with medications, diet and exercise. Lowering your levels can help keep your heart and blood vessels healthy.  Your provider will order these tests if they are needed.    · An ultrasound is done to see if you have an abdominal aortic aneurysm (AAA).  This is an  enlargement of one of the main blood vessels that delivers blood to the body.   In the [place], 9,000 deaths are caused by AAA.  You may not even know you have this problem and as many as 1 in 3 people will have a serious problem if it is not treated.  Early diagnosis allows for more effective treatment and cure.  If you have a family history of AAA or are a male age 65-75 who has smoked, you are at higher risk of an AAA.  Your provider can order this test, if needed.    Colorectal Screening:  · There are many tests that are used to check for cancer of your colon and rectum. You and your provider should discuss what test is best for you and when to have it done.  Options include:  · Screening Colonoscopy: exam of the entire colon, seen through a flexible lighted tube.  · Flexible Sigmoidoscopy: exam of the last third (sigmoid portion) of the colon and rectum, seen through a flexible lighted tube.  · Cologuard DNA stool test: a sample of your stool is used to screen for cancer and unseen blood in your stool.  · Fecal Occult Blood Test: a sample of your stool is studied to find any unseen blood    Flu Shot:  · An immunization that helps to prevent influenza (the flu). You should get this every year. The best time to get the shot is in the fall.    Pneumococcal Shot:  • Vaccines are available that can help prevent pneumococcal disease, which is any type of infection caused by Streptococcus pneumoniae bacteria.   Their use can prevent some cases of pneumonia, meningitis, and sepsis. There are two types of pneumococcal vaccines:   o Conjugate vaccines (PCV-13 or Prevnar 13®) - helps protect against the 13 types of pneumococcal bacteria that are the most common causes of serious infections in children and adults.    o Polysaccharide vaccine (PPSV23 or Weveervac84®) - helps protect against 23 types of pneumococcal bacteria for patients who are recommended to get it.  These vaccines should be given at least 12 months  apart.  A booster is usually not needed.     Hepatitis B Shot:  · An immunization that helps to protect people from getting Hepatitis B. Hepatitis B is a virus that spreads through contact with infected blood or body fluids. Many people with the virus do not have symptoms.  The virus can lead to serious problems, such as liver disease. Some people are at higher risk than others. Your doctor will tell you if you need this shot.     Diabetes Screening:  · A test to measure sugar (glucose) in your blood is called a fasting blood sugar. Fasting means you cannot have food or drink for at least 8 hours before the test. This test can detect diabetes long before you may notice symptoms.    Glaucoma Screening:  · Glaucoma screening is performed by your eye doctor. The test measures the fluid pressure inside your eyes to determine if you have glaucoma.     Hepatitis C Screening:  · A blood test to see if you have the hepatitis C virus.  Hepatitis C attacks the liver and is a major cause of chronic liver disease.  Medicare will cover a single screening for all adults born between 1945 & 1965, or high risk patients (people who have injected illegal drugs or people who have had blood transfusions).  High risk patients who continue to inject illegal drugs can be screened for Hepatitis C every year.    Smoking and Tobacco-Use Cessation Counseling:  · Tobacco is the single greatest cause of disease and early death in our country today. Medication and counseling together can increase a person’s chance of quitting for good.   · Medicare covers two quitting attempts per year, with four counseling sessions per attempt (eight sessions in a 12 month period)    Preventive Screening tests for Women    Screening Mammograms and Breast Exams:  · An x-ray of your breasts to check for breast cancer before you or your doctor may be able to feel it.  If breast cancer is found early it can usually be treated with success.    Pelvic Exams and Pap  Tests:  · An exam to check for cervical and vaginal cancer. A Pap test is a lab test in which cells are taken from your cervix and sent to the lab to look for signs of cervical cancer. If cancer of the cervix is found early, chances for a cure are good. Testing can generally end at age 65, or if a woman has a hysterectomy for a benign condition. Your provider may recommend more frequent testing if certain abnormal results are found.    Bone Mass Measurements:  · A painless x-ray of your bone density to see if you are at risk for a broken bone. Bone density refers to the thickness of bones or how tightly the bone tissue is packed.    Preventive Screening tests for Men    Prostate Screening:  · PSA - Prostate Cancer blood test.  Experts do not recommend routine screening of healthy men with no signs or symptoms of prostate disease.  However, men should not ignore urinary symptoms, and should discuss their family history with their doctor.    *Medicare pays for many preventive services to keep you healthy. For some of these services, you might have to pay a deductible, coinsurance, and / or copayment.  The amounts vary depending on the type of services you need and the kind of Medicare health plan you have.

## 2025-05-15 NOTE — PROGRESS NOTES
Monitoring Blood Sugars    Instructions for Meter Teaching- Patient instructed in the following:  Site selection and skin preparation, Loading strips and lancet device, meter activation, obtaining blood sample, test strip and lancet disposal and recording log book entries. Patient has good understanding of material covered and was able to test their own blood sugar in office today.     Comments: Gave and instructed on True Metrix meter, Patient demonstrated use, blood sugar in office 131 mg/dl  at  3:22 pm.    Lot #: [id number]  Exp Date: 10/31/2026    Testing frequency: Encouraged pair testing. Test sugars before a meal and 2hr after the same meal, rotating between breakfast, lunch, and dinner. Test sugars twice a day (3 days a week, 7 days a week).     Goal Blood Sugars:   Premeal , even better <110  2hr after a meal <180, even better <140  A1C <7%, even better <6.5%.    Kimbreley Espinosa, MS, RDN, LDN, CDCES  [address] [place] [address]

## 2025-05-20 ENCOUNTER — OFFICE VISIT (OUTPATIENT)
Dept: DIABETES SERVICES | Facility: CLINIC | Age: 70
End: 2025-05-20
Payer: MEDICARE

## 2025-05-20 VITALS — WEIGHT: 234.4 LBS | BODY MASS INDEX: 33.63 KG/M2

## 2025-05-20 DIAGNOSIS — E11.9 TYPE 2 DIABETES MELLITUS WITHOUT COMPLICATION, WITHOUT LONG-TERM CURRENT USE OF INSULIN (HCC): Primary | ICD-10-CM

## 2025-05-20 PROCEDURE — G0108 DIAB MANAGE TRN  PER INDIV: HCPCS

## 2025-05-25 DIAGNOSIS — N40.1 BPH WITH OBSTRUCTION/LOWER URINARY TRACT SYMPTOMS: ICD-10-CM

## 2025-05-25 DIAGNOSIS — N13.8 BPH WITH OBSTRUCTION/LOWER URINARY TRACT SYMPTOMS: ICD-10-CM

## 2025-05-25 RX ORDER — SILODOSIN 8 MG/1
1 CAPSULE ORAL
Qty: 30 CAPSULE | Refills: 5 | Status: SHIPPED | OUTPATIENT
Start: 2025-05-25

## 2025-05-29 DIAGNOSIS — K21.9 GASTROESOPHAGEAL REFLUX DISEASE WITHOUT ESOPHAGITIS: ICD-10-CM

## 2025-05-29 RX ORDER — PANTOPRAZOLE SODIUM 40 MG/1
40 TABLET, DELAYED RELEASE ORAL 2 TIMES DAILY
Qty: 180 TABLET | Refills: 1 | Status: SHIPPED | OUTPATIENT
Start: 2025-05-29

## 2025-05-30 ENCOUNTER — OFFICE VISIT (OUTPATIENT)
Dept: OBGYN CLINIC | Facility: CLINIC | Age: 70
End: 2025-05-30
Payer: MEDICARE

## 2025-05-30 VITALS — BODY MASS INDEX: 33.5 KG/M2 | HEIGHT: 70 IN | WEIGHT: 234 LBS

## 2025-05-30 DIAGNOSIS — M65.332 TRIGGER FINGER, LEFT MIDDLE FINGER: Primary | ICD-10-CM

## 2025-05-30 PROCEDURE — 99213 OFFICE O/P EST LOW 20 MIN: CPT | Performed by: ORTHOPAEDIC SURGERY

## 2025-05-30 NOTE — ASSESSMENT & PLAN NOTE
Discussed with patient that today's physical exam is essentially benign  Although he does continue to experience mild mechanical symptoms, they are significantly improved as compared to his previous presentation  Discussed with patient that there is always a chance that his symptoms may recur. If they do, he can contact the office for reevaluation and consideration for repeat injection  At this time there is no imposed restrictions  Patient expresses understanding and is in agreement with the treatment plan

## 2025-05-30 NOTE — PROGRESS NOTES
Assessment & Plan  Trigger finger, left middle finger  Discussed with patient that today's physical exam is essentially benign  Although he does continue to experience mild mechanical symptoms, they are significantly improved as compared to his previous presentation  Discussed with patient that there is always a chance that his symptoms may recur. If they do, he can contact the office for reevaluation and consideration for repeat injection  At this time there is no imposed restrictions  Patient expresses understanding and is in agreement with the treatment plan         The patient is triggering along his left middle finger has now abated.  There is full strength full motion.  No hypertrophy.  There is full range of motion.  Tendon and ligament function intact.  Continue home exercise program.  Follow-up on an as-needed basis.  If his condition changes, he would not hesitate to let us know    Subjective:   Patient ID: Luca Bowling  1955     HPI  Patient is a 70 y.o. male who presents for follow-up evaluation for trigger finger of left middle finger. He was last seen in regards this issue on 3/24/2025, at which time he received a corticosteroid injection. Patient states that he has experienced relief of his mechanical clicking and catching, as well as complete relief of his pain following his injection. He reports occasional clicking, but states that it is much less severe than what he experienced prior to the injection. He denies any new onset bruising, swelling, numbness, tingling, or feelings of instability. He rates his pain today at 0/10.    The following portions of the patient's history were reviewed and updated as appropriate:  Past medical history, past surgical history, Family history, social history, current medications and allergies    Past Medical History[1]    Past Surgical History[2]    Family History[3]    Social History[4]    Current Medications[5]    No Known Allergies    Review of Systems  "  Constitutional:  Negative for chills, fatigue and fever.   Gastrointestinal:  Negative for nausea.   Musculoskeletal:         As noted in HPI   Neurological:  Negative for dizziness, numbness and headaches.   All other systems reviewed and are negative.       Objective:  Ht 5' 10\" (1.778 m)   Wt 106 kg (234 lb)   BMI 33.58 kg/m²     Ortho Exam  Left hand/middle finger -   Patient presents with no obvious anatomical deformity  Skin is warm and dry to touch with no signs of erythema, ecchymosis, infection  No soft tissue swelling or effusion noted   Full FDS, FDP, extensor mechanisms are intact   No rotational deformity with composite finger flexion  Nontender to palpation middle finger flexor tendon local to A1 pulley  Negative reproducible triggering on exam  Demonstrates normal wrist, elbow, and shoulder motion  Forearm compartments are soft and supple  2+ distal radial pulse with brisk capillary refill to the fingers  Radial, median, and ulnar motor and sensory distributions intact  Sensation light touch intact distally    Physical Exam  Vitals and nursing note reviewed.   Constitutional:       General: He is not in acute distress.     Appearance: He is well-developed.   HENT:      Head: Normocephalic and atraumatic.     Eyes:      Conjunctiva/sclera: Conjunctivae normal.       Cardiovascular:      Rate and Rhythm: Normal rate and regular rhythm.      Heart sounds: No murmur heard.  Pulmonary:      Effort: Pulmonary effort is normal. No respiratory distress.      Breath sounds: Normal breath sounds.   Abdominal:      Palpations: Abdomen is soft.      Tenderness: There is no abdominal tenderness.     Musculoskeletal:         General: No swelling.      Cervical back: Neck supple.     Skin:     General: Skin is warm and dry.      Capillary Refill: Capillary refill takes less than 2 seconds.     Neurological:      Mental Status: He is alert.     Psychiatric:         Mood and Affect: Mood normal.      "     Diagnostic Test Review:  No new imaging performed this visit    Procedures   No procedures performed this visit    Scribe Attestation      I,:  Luis Perezes am acting as a scribe while in the presence of the attending physician.:       I,:  Wayne Resendiz DO personally performed the services described in this documentation    as scribed in my presence.:                   [1]   Past Medical History:  Diagnosis Date    Abnormal ECG 05/2021    Had fluttering of my heart    Basal cell carcinoma     BPH (benign prostatic hypertrophy) 6-2023    Colon polyp     Complication of anesthesia     Difficult waking    Folliculitis 03/01/2024    Developed after back shaved for ablation      GERD (gastroesophageal reflux disease)     Headache, tension-type couple years ago    not frequent    Heart disease 2022    Afib, aflutter    HL (hearing loss) 10 years    only r/ear    Hyperlipidemia     Memory loss 6 years    slight    Seasonal allergies     Skin tag     Squamous cell skin cancer 04/26/2021    Left neck SCCIS    Stomach disorder 1990   [2]   Past Surgical History:  Procedure Laterality Date    CARDIAC ELECTROPHYSIOLOGY PROCEDURE N/A 2/2/2024    Procedure: Cardiac eps/afib ablation;  Surgeon: Chris Tejeda DO;  [facility];  Service: Cardiology    CARDIAC ELECTROPHYSIOLOGY PROCEDURE N/A 2/2/2024    Procedure: Cardiac eps/aflutter ablation;  Surgeon: Chris Tejeda DO;  [facility];  Service: Cardiology    CARDIAC ELECTROPHYSIOLOGY PROCEDURE N/A 4/3/2024    Procedure: Cardiac loop recorder implant;  Surgeon: David Ernst MD;  [facility];  Service: Cardiology    CHOLECYSTECTOMY      COLONOSCOPY N/A 10/4/2017    Procedure: COLONOSCOPY;  Surgeon: Richie Panda MD;  Location: [facility];  Service: Gastroenterology    EAR SURGERY      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)    MOHS SURGERY  05/04/2021    left neck     DE ESOPHAGOGASTRODUODENOSCOPY TRANSORAL  DIAGNOSTIC N/A 2018    Procedure: ESOPHAGOGASTRODUODENOSCOPY (EGD);  Surgeon: Richie Panda MD;  Location: [facility];  Service: Gastroenterology    SINUS SURGERY      SKIN BIOPSY     [3]   Family History  Problem Relation Name Age of Onset    Colon cancer Mother Herlinda     Hypertension Father Father     Heart attack Father Father         NSTEMI    Stroke Father Father     Hyperlipidemia Father Father     Hypertension Sister Sheila     Squamous cell carcinoma Sister Sheila     Rashes / Skin problems Sister Sheila     Hypertension Brother Evert         Has defibulator and pace maker    Cancer Brother Evert     Basal cell carcinoma Brother Evert         Face and nose    Dementia Maternal Grandmother andres hernandez         started in her 70's    Parkinsonism Paternal Grandfather rei garcia     Diabetes Paternal Grandmother henri     Colon cancer Paternal Grandmother henri     Stroke Paternal Grandmother henri    [4]   Social History  Socioeconomic History    Marital status: /Civil Union   Tobacco Use    Smoking status: Former     Current packs/day: 0.00     Average packs/day: 1 pack/day for 3.0 years (3.0 ttl pk-yrs)     Types: Cigarettes     Start date: 1970     Quit date:      Years since quittin.4    Smokeless tobacco: Never   Vaping Use    Vaping status: Never Used   Substance and Sexual Activity    Alcohol use: Yes     Alcohol/week: 3.0 standard drinks of alcohol     Types: 3 Glasses of wine per week     Comment: Occ wine    Drug use: No    Sexual activity: Yes     Partners: Female     Birth control/protection: Female Sterilization     Social Drivers of Health     Financial Resource Strain: Patient Declined (2023)    Overall Financial Resource Strain (CARDIA)     Difficulty of Paying Living Expenses: Patient declined   Food Insecurity: No Food Insecurity (2024)    Nursing - Inadequate Food Risk Classification     Worried About Running Out of Food in the Last Year: Never  true     Ran Out of Food in the Last Year: Never true   Transportation Needs: No Transportation Needs (7/16/2024)    PRAPARE - Transportation     Lack of Transportation (Medical): No     Lack of Transportation (Non-Medical): No   Housing Stability: Low Risk  (7/16/2024)    Housing Stability Vital Sign     Unable to Pay for Housing in the Last Year: No     Number of Times Moved in the Last Year: 1     Homeless in the Last Year: No   [5]   Current Outpatient Medications:     ALPRAZolam (XANAX) 0.25 mg tablet, Take 1 tablet (0.25 mg total) by mouth daily as needed for anxiety, Disp: 30 tablet, Rfl: 0    atorvastatin (LIPITOR) 40 mg tablet, TAKE 1 TABLET DAILY, Disp: 90 tablet, Rfl: 1    fluticasone (FLONASE) 50 mcg/act nasal spray, SQUIRT 1 SPRAY INTO EACH NOSTRIL DAILY AS NEEDED, Disp: 48 mL, Rfl: 2    metoprolol succinate (TOPROL-XL) 50 mg 24 hr tablet, TAKE 1 TABLET DAILY, Disp: 90 tablet, Rfl: 1    pantoprazole (PROTONIX) 40 mg tablet, TAKE 1 TABLET TWICE A DAY, Disp: 180 tablet, Rfl: 1    Probiotic Product (Align) 4 MG CAPS, , Disp: , Rfl:     Silodosin 8 MG CAPS, TAKE 1 CAPSULE BY MOUTH EVERYDAY AT BEDTIME, Disp: 30 capsule, Rfl: 5    Blood Glucose Monitoring Suppl (OneTouch Verio Reflect) w/Device KIT, Check blood sugars twice daily. Please substitute with appropriate alternative as covered by patient's insurance. Dx: E11.65 (Patient not taking: Reported on 5/5/2025), Disp: 1 kit, Rfl: 0    glucose blood (OneTouch Verio) test strip, Check blood sugars twice daily. Please substitute with appropriate alternative as covered by patient's insurance. Dx: E11.65 (Patient not taking: Reported on 5/5/2025), Disp: 200 each, Rfl: 3    OneTouch Delica Lancets 33G MISC, Check blood sugars twice daily. Please substitute with appropriate alternative as covered by patient's insurance. Dx: E11.65 (Patient not taking: Reported on 5/5/2025), Disp: 200 each, Rfl: 3

## 2025-05-31 ENCOUNTER — HOSPITAL ENCOUNTER (EMERGENCY)
Facility: HOSPITAL | Age: 70
Discharge: HOME | End: 2025-05-31
Attending: EMERGENCY MEDICINE | Admitting: EMERGENCY MEDICINE
Payer: MEDICARE

## 2025-05-31 VITALS
OXYGEN SATURATION: 98 % | TEMPERATURE: 97.8 F | SYSTOLIC BLOOD PRESSURE: 166 MMHG | DIASTOLIC BLOOD PRESSURE: 75 MMHG | RESPIRATION RATE: 16 BRPM | HEART RATE: 74 BPM
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
## 2025-05-31 PROCEDURE — 12001 RPR S/N/AX/GEN/TRNK 2.5CM/<: CPT | Performed by: EMERGENCY MEDICINE

## 2025-05-31 PROCEDURE — 99284 EMERGENCY DEPT VISIT MOD MDM: CPT | Performed by: EMERGENCY MEDICINE

## 2025-05-31 PROCEDURE — 90715 TDAP VACCINE 7 YRS/> IM: CPT | Performed by: EMERGENCY MEDICINE

## 2025-05-31 PROCEDURE — 90471 IMMUNIZATION ADMIN: CPT

## 2025-05-31 PROCEDURE — 99283 EMERGENCY DEPT VISIT LOW MDM: CPT

## 2025-05-31 RX ORDER — LIDOCAINE HYDROCHLORIDE 20 MG/ML
5 INJECTION, SOLUTION EPIDURAL; INFILTRATION; INTRACAUDAL; PERINEURAL ONCE
Status: COMPLETED | OUTPATIENT
Start: 2025-05-31 | End: 2025-05-31

## 2025-05-31 RX ADMIN — AMOXICILLIN AND CLAVULANATE POTASSIUM 1 TABLET: 875; 125 TABLET, FILM COATED ORAL at 20:32

## 2025-05-31 RX ADMIN — TETANUS TOXOID, REDUCED DIPHTHERIA TOXOID AND ACELLULAR PERTUSSIS VACCINE, ADSORBED 0.5 ML: 5; 2.5; 8; 8; 2.5 SUSPENSION INTRAMUSCULAR at 20:32

## 2025-05-31 RX ADMIN — LIDOCAINE HYDROCHLORIDE 5 ML: 20 INJECTION, SOLUTION EPIDURAL; INFILTRATION; INTRACAUDAL at 20:33

## 2025-06-01 NOTE — ED PROVIDER NOTES
Time reflects when diagnosis was documented in both MDM as applicable and the Disposition within this note       Time User Action Codes Description Comment    5/31/2025  8:28 PM Nirmala Lopez Add [W54.0XXA] Dog bite, initial encounter           ED Disposition       ED Disposition   Discharge    Condition   Stable    Date/Time   Sat May 31, 2025  8:42 PM    Comment   Luca Dash discharge to home/self care.                   Assessment & Plan       Medical Decision Making  Patient is a 70-year-old male past medical history of diabetes, hyperlipidemia, atrial fibrillation, anxiety, BPH, GERD presenting with dog bite to left pinky.  Patient is well-appearing at bedside with stable vitals and in no acute distress.  He is neurovascularly intact and has 1 cm laceration into the subcutaneous tissue of the left fifth digit but with intact range of motion all joints, intact sensation and capillary refill.  Have applied 3 loose sutures given patient history of diabetes and dog bite to the distal extremity to allow drainage as necessary, placed on Augmentin, updated tetanus and have discussed return precautions with patient states he understands    Risk  Prescription drug management.        ED Course as of 06/01/25 1952   Sat May 31, 2025   2041 3 stitches, have discussed return precautions and outpatient follow-up which patient states he understands.       Medications   amoxicillin-clavulanate (AUGMENTIN) 875-125 mg per tablet 1 tablet (1 tablet Oral Given 5/31/25 2032)   tetanus-diphtheria-acellular pertussis (BOOSTRIX) IM injection 0.5 mL (0.5 mL Intramuscular Given 5/31/25 2032)   lidocaine (PF) (XYLOCAINE-MPF) 2 % injection 5 mL (5 mL Infiltration Given 5/31/25 2033)       ED Risk Strat Scores                    No data recorded                            History of Present Illness       Chief Complaint   Patient presents with    Dog Bite     Pt states he was throwing a ball tay is dog when her tooth cut his left  pinky, dog is UTD on vaccines        Past Medical History[1]   Past Surgical History[2]   Family History[3]   Social History[4]   E-Cigarette/Vaping    E-Cigarette Use Never User       E-Cigarette/Vaping Substances    Nicotine No     THC No     CBD No     Flavoring No     Other No     Unknown No       I have reviewed and agree with the history as documented.     Patient is 70-year-old male past medical history of hyperlipidemia, diabetes, atrial fibrillation, anxiety, BPH, GERD presenting with dog bite.  Patient has dog bite to left pinky which she states happened at 6:30 PM roughly 2 hours ago and states that it was his dog who is up-to-date with all imitations.  States he is unsure when his last tetanus shot was.  Soaked the area and hydrogen peroxide.  Denies any numbness or tingling but notes pain.  Has not taken any medication for pain as of yet.        Review of Systems   All other systems reviewed and are negative.          Objective       ED Triage Vitals [05/31/25 2021]   Temperature Pulse Blood Pressure Respirations SpO2 Patient Position - Orthostatic VS   97.8 °F (36.6 °C) 74 166/75 16 98 % Sitting      Temp Source Heart Rate Source BP Location FiO2 (%) Pain Score    Temporal Monitor Left arm -- --      Vitals      Date and Time Temp Pulse SpO2 Resp BP Pain Score FACES Pain Rating User   05/31/25 2021 97.8 °F (36.6 °C) 74 98 % 16 166/75 -- -- MS            Physical Exam  Vitals reviewed.   Constitutional:       General: He is not in acute distress.     Appearance: Normal appearance. He is not ill-appearing.   HENT:      Mouth/Throat:      Mouth: Mucous membranes are moist.     Eyes:      Conjunctiva/sclera: Conjunctivae normal.       Cardiovascular:      Rate and Rhythm: Normal rate.      Pulses: Normal pulses.   Pulmonary:      Effort: Pulmonary effort is normal.     Musculoskeletal:         General: Tenderness present. No swelling. Normal range of motion.      Cervical back: Neck supple.      Comments:  Patient has 1 cm laceration to the volar aspect of the left fifth digit with exposed fat but no exposed bone or vasculature with active oozing     Skin:     General: Skin is warm and dry.      Capillary Refill: Capillary refill takes less than 2 seconds.     Neurological:      General: No focal deficit present.      Mental Status: He is alert.      Sensory: No sensory deficit.      Motor: No weakness.     Psychiatric:         Mood and Affect: Mood normal.         Results Reviewed       None            No orders to display         Universal Protocol:  procedure performed by consultantConsent: Verbal consent obtained  Consent given by: patient  Patient understanding: patient states understanding of the procedure being performed  Patient consent: the patient's understanding of the procedure matches consent given  Procedure consent: procedure consent matches procedure scheduled  Relevant documents: relevant documents present and verified  Test results: test results available and properly labeled  Site marked: the operative site was marked  Radiology Images displayed and confirmed. If images not available, report reviewed: imaging studies available  Patient identity confirmed: verbally with patient  Laceration repair    Date/Time: 6/1/2025 7:50 PM    Performed by: Nirmala Lopez DO  Authorized by: Nirmala Lopez DO  Body area: upper extremity  Location details: left small finger  Laceration length: 1 cm  Tendon involvement: none  Nerve involvement: none  Vascular damage: no  Anesthesia: digital block    Anesthesia:  Local Anesthetic: lidocaine 2% without epinephrine    Wound Dehiscence:  Superficial Wound Dehiscence: simple closure      Procedure Details:  Irrigation solution: saline  Irrigation method: syringe  Amount of cleaning: standard  Debridement: none  Degree of undermining: none  Skin closure: 4-0 nylon  Number of sutures: 3  Approximation: loose  Approximation difficulty: simple  Patient tolerance:  patient tolerated the procedure well with no immediate complications          ED Medication and Procedure Management   Prior to Admission Medications   Prescriptions Last Dose Informant Patient Reported? Taking?   ALPRAZolam (XANAX) 0.25 mg tablet  Self No No   Sig: Take 1 tablet (0.25 mg total) by mouth daily as needed for anxiety   Blood Glucose Monitoring Suppl (OneTouch Verio Reflect) w/Device KIT  Self No No   Sig: Check blood sugars twice daily. Please substitute with appropriate alternative as covered by patient's insurance. Dx: E11.65   Patient not taking: Reported on 5/5/2025   OneTouch Delica Lancets 33G MISC  Self No No   Sig: Check blood sugars twice daily. Please substitute with appropriate alternative as covered by patient's insurance. Dx: E11.65   Patient not taking: Reported on 5/5/2025   Probiotic Product (Align) 4 MG CAPS  Self Yes No   Silodosin 8 MG CAPS   No No   Sig: TAKE 1 CAPSULE BY MOUTH EVERYDAY AT BEDTIME   atorvastatin (LIPITOR) 40 mg tablet   No No   Sig: TAKE 1 TABLET DAILY   fluticasone (FLONASE) 50 mcg/act nasal spray  Self No No   Sig: SQUIRT 1 SPRAY INTO EACH NOSTRIL DAILY AS NEEDED   glucose blood (OneTouch Verio) test strip  Self No No   Sig: Check blood sugars twice daily. Please substitute with appropriate alternative as covered by patient's insurance. Dx: E11.65   Patient not taking: Reported on 5/5/2025   metoprolol succinate (TOPROL-XL) 50 mg 24 hr tablet  Self No No   Sig: TAKE 1 TABLET DAILY   pantoprazole (PROTONIX) 40 mg tablet   No No   Sig: TAKE 1 TABLET TWICE A DAY      Facility-Administered Medications: None     Discharge Medication List as of 5/31/2025  8:42 PM        START taking these medications    Details   amoxicillin-clavulanate (AUGMENTIN) 875-125 mg per tablet Take 1 tablet by mouth every 12 (twelve) hours for 7 days, Starting Sat 5/31/2025, Until Sat 6/7/2025, Normal           CONTINUE these medications which have NOT CHANGED    Details   ALPRAZolam (XANAX)  0.25 mg tablet Take 1 tablet (0.25 mg total) by mouth daily as needed for anxiety, Starting Thu 7/13/2023, Normal      atorvastatin (LIPITOR) 40 mg tablet TAKE 1 TABLET DAILY, Starting Tue 4/15/2025, Normal      Blood Glucose Monitoring Suppl (OneTouch Verio Reflect) w/Device KIT Check blood sugars twice daily. Please substitute with appropriate alternative as covered by patient's insurance. Dx: E11.65, Normal      fluticasone (FLONASE) 50 mcg/act nasal spray SQUIRT 1 SPRAY INTO EACH NOSTRIL DAILY AS NEEDED, Normal      glucose blood (OneTouch Verio) test strip Check blood sugars twice daily. Please substitute with appropriate alternative as covered by patient's insurance. Dx: E11.65, Normal      metoprolol succinate (TOPROL-XL) 50 mg 24 hr tablet TAKE 1 TABLET DAILY, Starting Mon 4/7/2025, Normal      OneTouch Delica Lancets 33G MISC Check blood sugars twice daily. Please substitute with appropriate alternative as covered by patient's insurance. Dx: E11.65, Normal      pantoprazole (PROTONIX) 40 mg tablet TAKE 1 TABLET TWICE A DAY, Starting Thu 5/29/2025, Normal      Probiotic Product (Align) 4 MG CAPS Starting Sun 1/2/2022, Historical Med      Silodosin 8 MG CAPS TAKE 1 CAPSULE BY MOUTH EVERYDAY AT BEDTIME, Starting Sun 5/25/2025, Normal           No discharge procedures on file.  ED SEPSIS DOCUMENTATION   Time reflects when diagnosis was documented in both MDM as applicable and the Disposition within this note       Time User Action Codes Description Comment    5/31/2025  8:28 PM Nirmala Lopez [W54.0XXA] Dog bite, initial encounter                    [1]   Past Medical History:  Diagnosis Date    Abnormal ECG 05/2021    Had fluttering of my heart    Basal cell carcinoma     BPH (benign prostatic hypertrophy) 6-2023    Colon polyp     Complication of anesthesia     Difficult waking    Folliculitis 03/01/2024    Developed after back shaved for ablation      GERD (gastroesophageal reflux disease)      Headache, tension-type couple years ago    not frequent    Heart disease 2022    Afib, aflutter    HL (hearing loss) 10 years    only r/ear    Hyperlipidemia     Memory loss 6 years    slight    Seasonal allergies     Skin tag     Squamous cell skin cancer 04/26/2021    Left neck SCCIS    Stomach disorder 1990   [2]   Past Surgical History:  Procedure Laterality Date    CARDIAC ELECTROPHYSIOLOGY PROCEDURE N/A 2/2/2024    Procedure: Cardiac eps/afib ablation;  Surgeon: Chris Tejeda DO;  [facility];  Service: Cardiology    CARDIAC ELECTROPHYSIOLOGY PROCEDURE N/A 2/2/2024    Procedure: Cardiac eps/aflutter ablation;  Surgeon: Chris Tejeda DO;  [facility];  Service: Cardiology    CARDIAC ELECTROPHYSIOLOGY PROCEDURE N/A 4/3/2024    Procedure: Cardiac loop recorder implant;  Surgeon: David Ernst MD;  [facility];  Service: Cardiology    CHOLECYSTECTOMY      COLONOSCOPY N/A 10/4/2017    Procedure: COLONOSCOPY;  Surgeon: Richie Panda MD;  Location: [facility];  Service: Gastroenterology    EAR SURGERY      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)    MOHS SURGERY  05/04/2021    left neck     NH ESOPHAGOGASTRODUODENOSCOPY TRANSORAL DIAGNOSTIC N/A 5/31/2018    Procedure: ESOPHAGOGASTRODUODENOSCOPY (EGD);  Surgeon: Richie Panda MD;  Location[facility];  Service: Gastroenterology    SINUS SURGERY      SKIN BIOPSY     [3]   Family History  Problem Relation Name Age of Onset    Colon cancer Mother Herlinda     Hypertension Father Father     Heart attack Father Father         NSTEMI    Stroke Father Father     Hyperlipidemia Father Father     Hypertension Sister Sheila     Squamous cell carcinoma Sister Sheila     Rashes / Skin problems Sister Sheila     Hypertension Brother Evert         Has defibulator and pace maker    Cancer Brother Evert     Basal cell carcinoma Brother Evert         Face and nose    Dementia Maternal Grandmother andres hernandez          started in her 70's    Parkinsonism Paternal Grandfather rei garcia     Diabetes Paternal Grandmother henri     Colon cancer Paternal Grandmother henri     Stroke Paternal Grandmother henri    [4]   Social History  Tobacco Use    Smoking status: Former     Current packs/day: 0.00     Average packs/day: 1 pack/day for 3.0 years (3.0 ttl pk-yrs)     Types: Cigarettes     Start date: 1970     Quit date:      Years since quittin.4    Smokeless tobacco: Never   Vaping Use    Vaping status: Never Used   Substance Use Topics    Alcohol use: Yes     Alcohol/week: 3.0 standard drinks of alcohol     Types: 3 Glasses of wine per week     Comment: Occ wine    Drug use: No        Nirmala Lopez DO  27

## 2025-06-09 ENCOUNTER — RESULTS FOLLOW-UP (OUTPATIENT)
Dept: FAMILY MEDICINE CLINIC | Facility: CLINIC | Age: 70
End: 2025-06-09

## 2025-06-09 ENCOUNTER — APPOINTMENT (OUTPATIENT)
Dept: RADIOLOGY | Facility: CLINIC | Age: 70
End: 2025-06-09
Payer: MEDICARE

## 2025-06-09 ENCOUNTER — OFFICE VISIT (OUTPATIENT)
Dept: FAMILY MEDICINE CLINIC | Facility: CLINIC | Age: 70
End: 2025-06-09
Payer: MEDICARE

## 2025-06-09 VITALS
OXYGEN SATURATION: 94 % | HEART RATE: 70 BPM | DIASTOLIC BLOOD PRESSURE: 80 MMHG | BODY MASS INDEX: 32.5 KG/M2 | SYSTOLIC BLOOD PRESSURE: 132 MMHG | HEIGHT: 70 IN | WEIGHT: 227 LBS | TEMPERATURE: 97.8 F

## 2025-06-09 DIAGNOSIS — R05.8 PRODUCTIVE COUGH: ICD-10-CM

## 2025-06-09 DIAGNOSIS — E11.9 TYPE 2 DIABETES MELLITUS WITHOUT COMPLICATION, WITHOUT LONG-TERM CURRENT USE OF INSULIN (HCC): Primary | ICD-10-CM

## 2025-06-09 DIAGNOSIS — Z48.02 ENCOUNTER FOR REMOVAL OF SUTURES: ICD-10-CM

## 2025-06-09 LAB — SL AMB POCT UR MICROALBUMIN: <30

## 2025-06-09 PROCEDURE — 99214 OFFICE O/P EST MOD 30 MIN: CPT | Performed by: FAMILY MEDICINE

## 2025-06-09 PROCEDURE — 71046 X-RAY EXAM CHEST 2 VIEWS: CPT

## 2025-06-09 PROCEDURE — 82043 UR ALBUMIN QUANTITATIVE: CPT | Performed by: FAMILY MEDICINE

## 2025-06-09 PROCEDURE — G2211 COMPLEX E/M VISIT ADD ON: HCPCS | Performed by: FAMILY MEDICINE

## 2025-06-09 PROCEDURE — 82570 ASSAY OF URINE CREATININE: CPT | Performed by: FAMILY MEDICINE

## 2025-06-09 RX ORDER — DEXTROMETHORPHAN HYDROBROMIDE AND PROMETHAZINE HYDROCHLORIDE 15; 6.25 MG/5ML; MG/5ML
2.5 SYRUP ORAL 3 TIMES DAILY PRN
Qty: 180 ML | Refills: 1 | Status: SHIPPED | OUTPATIENT
Start: 2025-06-09

## 2025-06-09 RX ORDER — DOXYCYCLINE 100 MG/1
100 TABLET ORAL 2 TIMES DAILY
Qty: 14 TABLET | Refills: 0 | Status: SHIPPED | OUTPATIENT
Start: 2025-06-09 | End: 2025-06-16

## 2025-06-09 RX ORDER — AZELASTINE 1 MG/ML
1 SPRAY, METERED NASAL 2 TIMES DAILY
Qty: 30 ML | Refills: 1 | Status: SHIPPED | OUTPATIENT
Start: 2025-06-09

## 2025-06-09 NOTE — ASSESSMENT & PLAN NOTE
Lab Results   Component Value Date    HGBA1C 7.5 (H) 04/02/2025     He would like to continue doing lifestyle modifications.    Orders:    POCT urine microalbumin/creatinine

## 2025-06-09 NOTE — PROGRESS NOTES
Name: Luca Bowling      : 1955      MRN: [id number]  Encounter Provider: Terell Dowd MD  Encounter Date: 2025   Encounter department: [facility]  :  Assessment & Plan  Type 2 diabetes mellitus without complication, without long-term current use of insulin ([facility])    Lab Results   Component Value Date    HGBA1C 7.5 (H) 2025     He would like to continue doing lifestyle modifications.    Orders:    POCT urine microalbumin/creatinine    Productive cough  After discussing risks and benefits of medication along with side effects will start the following:   Orders:    promethazine-dextromethorphan (PHENERGAN-DM) 6.25-15 mg/5 mL oral syrup; Take 2.5 mL by mouth 3 (three) times a day as needed for cough    azelastine (ASTELIN) 0.1 % nasal spray; 1 spray into each nostril 2 (two) times a day Use in each nostril as directed    doxycycline (ADOXA) 100 MG tablet; Take 1 tablet (100 mg total) by mouth 2 (two) times a day for 7 days    XR chest pa and lateral; Future    Encounter for removal of sutures  1 suture removed from left 5th finger    Follow up in 1 month              History of Present Illness   Patient is here for suture removal. He had a dog bite, his own pet and went to the ER on . Had a Tetanus shot and received augmentin for 7 days. Also had sutures placed given the wound was bleeding.  Also has a productive cough going on for the past several days. Denies any fevers has nasal pressure and congestion. Flonase not helping.  Has elevated glucose, no symptoms related to this.      Review of Systems   Constitutional:  Negative for activity change, appetite change, fatigue and fever.   HENT:  Positive for congestion, postnasal drip, sinus pressure and sinus pain. Negative for ear discharge.    Respiratory:  Positive for cough. Negative for shortness of breath.    Cardiovascular:  Negative for chest pain and palpitations.   Gastrointestinal:  Negative for diarrhea and  "nausea.   Musculoskeletal:  Negative for arthralgias and back pain.   Skin:  Positive for wound. Negative for color change and rash.   Neurological:  Negative for dizziness and headaches.   Psychiatric/Behavioral:  Negative for agitation and behavioral problems.        Objective   /80   Pulse 70   Temp 97.8 °F (36.6 °C)   Ht 5' 10\" (1.778 m)   Wt 103 kg (227 lb)   SpO2 94%   BMI 32.57 kg/m²      Physical Exam  Constitutional:       General: He is not in acute distress.     Appearance: He is well-developed. He is not diaphoretic.     Eyes:      General: No scleral icterus.     Pupils: Pupils are equal, round, and reactive to light.       Cardiovascular:      Rate and Rhythm: Normal rate and regular rhythm.      Pulses: no weak pulses.           Dorsalis pedis pulses are 2+ on the right side and 2+ on the left side.        Posterior tibial pulses are 2+ on the right side and 2+ on the left side.      Heart sounds: Normal heart sounds. No murmur heard.  Pulmonary:      Effort: Pulmonary effort is normal. No respiratory distress.      Breath sounds: Normal breath sounds. No wheezing.   Abdominal:      General: Bowel sounds are normal. There is no distension.      Palpations: Abdomen is soft.      Tenderness: There is no abdominal tenderness.   Feet:      Right foot:      Skin integrity: No ulcer, skin breakdown, erythema, warmth, callus or dry skin.      Left foot:      Skin integrity: No ulcer, skin breakdown, erythema, warmth, callus or dry skin.     Skin:     General: Skin is warm and dry.      Findings: No rash.      Comments: Left 5th finger distal wound is clean, dry and intact.     Neurological:      Mental Status: He is alert and oriented to person, place, and time.     Diabetic Foot Exam    Patient's shoes and socks removed.    Right Foot/Ankle   Right Foot Inspection  Skin Exam: skin normal and skin intact. No dry skin, no warmth, no callus, no erythema, no maceration, no abnormal color, no " pre-ulcer, no ulcer and no callus.     Toe Exam: ROM and strength within normal limits.     Sensory   Vibration: intact  Proprioception: intact  Monofilament testing: intact    Vascular  The right DP pulse is 2+. The right PT pulse is 2+.     Left Foot/Ankle  Left Foot Inspection  Skin Exam: skin normal and skin intact. No dry skin, no warmth, no erythema, no maceration, normal color, no pre-ulcer, no ulcer and no callus.     Toe Exam: ROM and strength within normal limits.     Sensory   Vibration: intact  Proprioception: intact  Monofilament testing: intact    Vascular  The left DP pulse is 2+. The left PT pulse is 2+.     Assign Risk Category  No deformity present  No loss of protective sensation  No weak pulses  Risk: 0

## 2025-06-09 NOTE — ASSESSMENT & PLAN NOTE
After discussing risks and benefits of medication along with side effects will start the following:   Orders:    promethazine-dextromethorphan (PHENERGAN-DM) 6.25-15 mg/5 mL oral syrup; Take 2.5 mL by mouth 3 (three) times a day as needed for cough    azelastine (ASTELIN) 0.1 % nasal spray; 1 spray into each nostril 2 (two) times a day Use in each nostril as directed    doxycycline (ADOXA) 100 MG tablet; Take 1 tablet (100 mg total) by mouth 2 (two) times a day for 7 days    XR chest pa and lateral; Future

## 2025-06-27 ENCOUNTER — TELEPHONE (OUTPATIENT)
Age: 70
End: 2025-06-27

## 2025-06-27 DIAGNOSIS — E78.5 HYPERLIPIDEMIA, UNSPECIFIED HYPERLIPIDEMIA TYPE: ICD-10-CM

## 2025-06-27 DIAGNOSIS — E11.9 TYPE 2 DIABETES MELLITUS WITHOUT COMPLICATION, WITHOUT LONG-TERM CURRENT USE OF INSULIN (HCC): Primary | ICD-10-CM

## 2025-06-27 DIAGNOSIS — Z12.5 SCREENING FOR PROSTATE CANCER: ICD-10-CM

## 2025-06-27 NOTE — TELEPHONE ENCOUNTER
Patient called in to schedule his AWV. He is scheduled for 07/18. Are there any labs Dr. Dowd wants him to get done before his appointment? Please advise. Thank you!

## 2025-07-07 DIAGNOSIS — E78.5 HYPERLIPIDEMIA, UNSPECIFIED HYPERLIPIDEMIA TYPE: ICD-10-CM

## 2025-07-07 DIAGNOSIS — K21.9 GASTROESOPHAGEAL REFLUX DISEASE WITHOUT ESOPHAGITIS: ICD-10-CM

## 2025-07-07 RX ORDER — ATORVASTATIN CALCIUM 40 MG/1
40 TABLET, FILM COATED ORAL DAILY
Qty: 90 TABLET | Refills: 1 | Status: SHIPPED | OUTPATIENT
Start: 2025-07-07

## 2025-07-07 RX ORDER — PANTOPRAZOLE SODIUM 40 MG/1
40 TABLET, DELAYED RELEASE ORAL 2 TIMES DAILY
Qty: 180 TABLET | Refills: 1 | Status: SHIPPED | OUTPATIENT
Start: 2025-07-07

## 2025-07-08 DIAGNOSIS — N13.8 BPH WITH OBSTRUCTION/LOWER URINARY TRACT SYMPTOMS: ICD-10-CM

## 2025-07-08 DIAGNOSIS — N40.1 BPH WITH OBSTRUCTION/LOWER URINARY TRACT SYMPTOMS: ICD-10-CM

## 2025-07-08 RX ORDER — SILODOSIN 8 MG/1
1 CAPSULE ORAL
Qty: 30 CAPSULE | Refills: 5 | Status: SHIPPED | OUTPATIENT
Start: 2025-07-08

## 2025-07-09 ENCOUNTER — APPOINTMENT (OUTPATIENT)
Dept: LAB | Facility: HOSPITAL | Age: 70
End: 2025-07-09
Payer: MEDICARE

## 2025-07-09 DIAGNOSIS — Z12.5 SCREENING FOR PROSTATE CANCER: ICD-10-CM

## 2025-07-09 DIAGNOSIS — E78.5 HYPERLIPIDEMIA, UNSPECIFIED HYPERLIPIDEMIA TYPE: ICD-10-CM

## 2025-07-09 DIAGNOSIS — E11.9 TYPE 2 DIABETES MELLITUS WITHOUT COMPLICATION, WITHOUT LONG-TERM CURRENT USE OF INSULIN (HCC): ICD-10-CM

## 2025-07-09 PROBLEM — R05.8 PRODUCTIVE COUGH: Status: RESOLVED | Noted: 2025-06-09 | Resolved: 2025-07-09

## 2025-07-09 LAB
ALBUMIN SERPL BCG-MCNC: 4.1 G/DL (ref 3.5–5)
ALP SERPL-CCNC: 63 U/L (ref 34–104)
ALT SERPL W P-5'-P-CCNC: 30 U/L (ref 7–52)
ANION GAP SERPL CALCULATED.3IONS-SCNC: 3 MMOL/L (ref 4–13)
AST SERPL W P-5'-P-CCNC: 17 U/L (ref 13–39)
BILIRUB SERPL-MCNC: 0.7 MG/DL (ref 0.2–1)
BUN SERPL-MCNC: 9 MG/DL (ref 5–25)
CALCIUM SERPL-MCNC: 9.4 MG/DL (ref 8.4–10.2)
CHLORIDE SERPL-SCNC: 107 MMOL/L (ref 96–108)
CHOLEST SERPL-MCNC: 157 MG/DL (ref ?–200)
CO2 SERPL-SCNC: 32 MMOL/L (ref 21–32)
CREAT SERPL-MCNC: 1.05 MG/DL (ref 0.6–1.3)
GFR SERPL CREATININE-BSD FRML MDRD: 72 ML/MIN/1.73SQ M
GLUCOSE P FAST SERPL-MCNC: 154 MG/DL (ref 65–99)
HDLC SERPL-MCNC: 43 MG/DL
LDLC SERPL CALC-MCNC: 90 MG/DL (ref 0–100)
NONHDLC SERPL-MCNC: 114 MG/DL
POTASSIUM SERPL-SCNC: 4.8 MMOL/L (ref 3.5–5.3)
PROT SERPL-MCNC: 6.7 G/DL (ref 6.4–8.4)
PSA SERPL-MCNC: 2.54 NG/ML (ref 0–4)
SODIUM SERPL-SCNC: 142 MMOL/L (ref 135–147)
TRIGL SERPL-MCNC: 122 MG/DL (ref ?–150)

## 2025-07-09 PROCEDURE — 36415 COLL VENOUS BLD VENIPUNCTURE: CPT

## 2025-07-09 PROCEDURE — G0103 PSA SCREENING: HCPCS

## 2025-07-09 PROCEDURE — 83036 HEMOGLOBIN GLYCOSYLATED A1C: CPT

## 2025-07-09 PROCEDURE — 80061 LIPID PANEL: CPT

## 2025-07-09 PROCEDURE — 80053 COMPREHEN METABOLIC PANEL: CPT

## 2025-07-10 LAB
EST. AVERAGE GLUCOSE BLD GHB EST-MCNC: 151 MG/DL
HBA1C MFR BLD: 6.9 %

## 2025-07-14 ENCOUNTER — OFFICE VISIT (OUTPATIENT)
Dept: CARDIOLOGY CLINIC | Facility: CLINIC | Age: 70
End: 2025-07-14
Payer: MEDICARE

## 2025-07-14 VITALS
DIASTOLIC BLOOD PRESSURE: 58 MMHG | BODY MASS INDEX: 32.9 KG/M2 | HEIGHT: 70 IN | HEART RATE: 66 BPM | SYSTOLIC BLOOD PRESSURE: 110 MMHG | WEIGHT: 229.8 LBS

## 2025-07-14 DIAGNOSIS — I48.0 PAROXYSMAL ATRIAL FIBRILLATION (HCC): Primary | ICD-10-CM

## 2025-07-14 DIAGNOSIS — I45.2 RIGHT BUNDLE BRANCH BLOCK WITH LEFT ANTERIOR FASCICULAR BLOCK: ICD-10-CM

## 2025-07-14 DIAGNOSIS — I48.3 TYPICAL ATRIAL FLUTTER (HCC): ICD-10-CM

## 2025-07-14 DIAGNOSIS — I49.1 PAC (PREMATURE ATRIAL CONTRACTION): ICD-10-CM

## 2025-07-14 LAB
ATRIAL RATE: 66 BPM
P AXIS: 49 DEGREES
PR INTERVAL: 134 MS
QRS AXIS: -58 DEGREES
QRSD INTERVAL: 136 MS
QT INTERVAL: 424 MS
QTC INTERVAL: 445 MS
T WAVE AXIS: 19 DEGREES
VENTRICULAR RATE: 66 BPM

## 2025-07-14 PROCEDURE — 99213 OFFICE O/P EST LOW 20 MIN: CPT | Performed by: INTERNAL MEDICINE

## 2025-07-14 PROCEDURE — 93000 ELECTROCARDIOGRAM COMPLETE: CPT | Performed by: INTERNAL MEDICINE

## 2025-07-14 NOTE — PROGRESS NOTES
"EPS Progress Note - Luca Bowling 70 y.o. male MRN: [id number]           ASSESSMENT/PLAN:    1. Paroxysmal atrial fibrillation (HCC)  POCT ECG      2. PAC (premature atrial contraction)        3. Typical atrial flutter (HCC)        4. Right bundle branch block with left anterior fascicular block         Primary diagnosis:   Atrial fibrillation/flutter   Initially diagnosed in 2022 after SVT, and A-fib found on [employer]  Started on baby aspirin for [id number] score of 1 as well as metoprolol for rate control  Seen by Dr. Tejeda September 2023 however wanted to avoid ablation  Increased palpitations December 2023 agreed to undergo ablation  Status post PVI/CTI line 2/2/2024  Currently on Eliquis for stroke prevention post ablation, as well as flecainide for rhythm control  EKG here sinus rhythm w/ RBBB      off flecainide  GERD   BPH   4.  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    Narrative  [facility] [id number] ICM/ACTIVE SYSTEM IS MRI CONDITIONAL  MERLIN TRANSMISSION: LOOP RECORDER. PRESENTING RHYTHM NSR @ 64 BPM. BATTERY STATUS \"OK.\" NO PATIENT OR DEVICE ACTIVATED EPISODES. NORMAL DEVICE FUNCTION. DL         Specimen Collected: 05/05/25  8:01 AM     Assessment & Plan  1. Paroxysmal atrial fibrillation:  - Underwent ablation procedure for symptomatic atrial fibrillation and atrial flutter in 02/2024.  - No recurrence of atrial fibrillation since the ablation.  - Loop recorder functioning appropriately and has shown 0% A-fib burden.    2. Chronic right bundle branch block with left anterior fascicular block:  - EKG indicates right bundle branch block with left anterior fascicular block.  - Small possibility of requiring a pacemaker in the future.  - Advised to monitor for symptoms such as sudden increased shortness of breath or fainting, which could indicate a need to check pulse and ensure it is not in the 30s.  - Condition is not new and has been present since initial visit.    Follow-up: " "07/2026               HPI:   History of Present Illness  The patient is a 70-year-old male who follows up for paroxysmal atrial fibrillation, status post atrial fibrillation and atrial flutter ablation, chronic right bundle branch block with left anterior fascicular block, asymptomatic.    He reports no palpitations, chest pain, or shortness of breath. He believes the ablation procedure was beneficial. He recently returned from a trip to [place], [place], [place], and [place].    SOCIAL HISTORY  Occupations: Retired   Exercise: Walks on the beach and swims in the pool               Review of Systems   Constitutional: Negative for chills and fever.   HENT:  Negative for congestion and sore throat.    Eyes:  Negative for blurred vision and double vision.   Cardiovascular:  Negative for chest pain, claudication, dyspnea on exertion, leg swelling, near-syncope, orthopnea, palpitations, paroxysmal nocturnal dyspnea and syncope.   Respiratory:  Negative for cough, shortness of breath and sputum production.    Hematologic/Lymphatic: Negative for bleeding problem. Does not bruise/bleed easily.   Skin:  Negative for itching and rash.   Musculoskeletal:  Negative for arthritis and joint pain.   Gastrointestinal:  Negative for abdominal pain, nausea and vomiting.   Genitourinary:  Negative for hematuria.   Neurological:  Negative for dizziness, focal weakness, headaches, light-headedness and weakness.   Psychiatric/Behavioral:  Negative for depression. The patient is not nervous/anxious.           Objective:     Vitals: Blood pressure 110/58, pulse 66, height 5' 10\" (1.778 m), weight 104 kg (229 lb 12.8 oz)., Body mass index is 32.97 kg/m².,        Physical Exam:    GEN: Luca Bowling appears well, alert and oriented x 3, pleasant and cooperative   HEENT: pupils equal, round, and reactive to light; extraocular muscles intact  NECK: supple, no carotid bruits   HEART: regular rhythm, normal S1 and S2, no murmurs, " clicks, gallops or rubs   LUNGS: clear to auscultation bilaterally; no wheezes, rales, or rhonchi   ABDOMEN: normal bowel sounds, soft, no tenderness, no distention  EXTREMITIES: peripheral pulses normal; no clubbing, cyanosis, or edema  NEURO: no focal findings   SKIN: normal without suspicious lesions on exposed skin    Medications:    Current Medications[1]     Family History[2]  Social History     Socioeconomic History    Marital status: /Civil Union     Spouse name: Not on file    Number of children: Not on file    Years of education: Not on file    Highest education level: Not on file   Occupational History    Not on file   Tobacco Use    Smoking status: Former     Current packs/day: 0.00     Average packs/day: 1 pack/day for 3.0 years (3.0 ttl pk-yrs)     Types: Cigarettes     Start date: 1970     Quit date:      Years since quittin.5    Smokeless tobacco: Never   Vaping Use    Vaping status: Never Used   Substance and Sexual Activity    Alcohol use: Yes     Alcohol/week: 3.0 standard drinks of alcohol     Types: 3 Glasses of wine per week     Comment: Occ wine    Drug use: No    Sexual activity: Yes     Partners: Female     Birth control/protection: Female Sterilization   Other Topics Concern    Not on file   Social History Narrative    Not on file     Social Drivers of Health     Financial Resource Strain: Patient Declined (2023)    Overall Financial Resource Strain (CARDIA)     Difficulty of Paying Living Expenses: Patient declined   Food Insecurity: No Food Insecurity (2025)    Nursing - Inadequate Food Risk Classification     Worried About Running Out of Food in the Last Year: Never true     Ran Out of Food in the Last Year: Never true     Ran Out of Food in the Last Year: Not on file   Transportation Needs: No Transportation Needs (2025)    PRAPARE - Transportation     Lack of Transportation (Medical): No     Lack of Transportation (Non-Medical): No   Physical  Activity: Not on file   Stress: Not on file   Social Connections: Not on file   Intimate Partner Violence: Not on file   Housing Stability: Low Risk  (2025)    Housing Stability Vital Sign     Unable to Pay for Housing in the Last Year: No     Number of Times Moved in the Last Year: 1     Homeless in the Last Year: No     Tobacco Use History[3]  Social History     Substance and Sexual Activity   Alcohol Use Yes    Alcohol/week: 3.0 standard drinks of alcohol    Types: 3 Glasses of wine per week    Comment: Occ wine       Labs & Results:  Below is the patient's most recent value for Albumin, ALT, AST, BUN, Calcium, Chloride, Cholesterol, CO2, Creatinine, GFR, Glucose, HDL, Hematocrit, Hemoglobin, Hemoglobin A1C, LDL, Magnesium, Phosphorus, Platelets, Potassium, PSA, Sodium, Triglycerides, and WBC.   Lab Results   Component Value Date    ALT 30 2025    AST 17 2025    BUN 9 2025    CALCIUM 9.4 2025     2025    CHOL 184 2016    CO2 32 2025    CREATININE 1.05 2025    HDL 43 2025    HCT 48.1 2024    HGB 16.3 2024    HGBA1C 6.9 (H) 2025    MG 2.1 2024     2024    K 4.8 2025    PSA 2.545 2025     2016    TRIG 122 2025    WBC 7.30 2024     Note: for a comprehensive list of the patient's lab results, access the Results Review activity.            Cardiac testing:   Results for orders placed during the hospital encounter of 21    Echo complete with contrast if indicated    Narrative  [address] [address]    Transthoracic Echocardiogram  2D, M-mode, Doppler, and Color Doppler    Study date:  13-May-2021    Patient: MARK ANTHONY DIAZ  MR number: [id number]  Account number: [id number]  : 1955  Age: 66 years  Gender: Male  Status: Outpatient  Location: [facility] [place]  Height: 70 in  Weight: 225 lb  BP:  122/ 68 mmHg    Indications: Atrial Fibrillation    Sonographer:  Bob Ellis RDCS  Referring Physician:  Terell Dowd MD  Group:  [facility]  Interpreting Physician:  Davy Zarate D.O.    SUMMARY    LEFT VENTRICLE:  Systolic function was normal. Ejection fraction was estimated to be 65 %.  There were no regional wall motion abnormalities.    HISTORY: PRIOR HISTORY: PAC's, Anxiety, Hyperlipidemia    PROCEDURE: The study was performed in the [facility] [place]. This was a routine study. The transthoracic approach was used. The study included complete 2D imaging, M-mode, complete spectral Doppler, and color Doppler. The  heart rate was 78 bpm, at the start of the study. Images were obtained from the parasternal, apical, subcostal, and suprasternal notch acoustic windows. Image quality was adequate.    LEFT VENTRICLE: Size was normal. Systolic function was normal. Ejection fraction was estimated to be 65 %. There were no regional wall motion abnormalities. Wall thickness was normal. No evidence of apical thrombus. DOPPLER: Left  ventricular diastolic function parameters were normal.    RIGHT VENTRICLE: The size was normal. Systolic function was normal. Wall thickness was normal.    LEFT ATRIUM: Size was normal.    RIGHT ATRIUM: Size was normal.    MITRAL VALVE: Valve structure was normal. There was normal leaflet separation. DOPPLER: The transmitral velocity was within the normal range. There was no evidence for stenosis. There was no significant regurgitation.    AORTIC VALVE: The valve was trileaflet. Leaflets exhibited normal thickness and normal cuspal separation. DOPPLER: Transaortic velocity was within the normal range. There was no evidence for stenosis. There was no significant  regurgitation.    TRICUSPID VALVE: The valve structure was normal. There was normal leaflet separation. DOPPLER: The transtricuspid velocity was within the normal range. There was no  evidence for stenosis. There was no significant regurgitation.    PULMONIC VALVE: Leaflets exhibited normal thickness, no calcification, and normal cuspal separation. DOPPLER: The transpulmonic velocity was within the normal range. There was no significant regurgitation.    PERICARDIUM: There was no pericardial effusion. The pericardium was normal in appearance.    AORTA: The root exhibited normal size.    SYSTEMIC VEINS: IVC: The inferior vena cava was normal in size.    SYSTEM MEASUREMENT TABLES    2D  %FS: 43.29 %  Ao Diam: 3.11 cm  EDV(Teich): 89.74 ml  EF(Teich): 74.63 %  ESV(Teich): 22.77 ml  IVSd: 0.7 cm  LA Area: 20.42 cm2  LA Diam: 3.75 cm  LVEDV MOD A4C: 141.44 ml  LVEF MOD A4C: 70.78 %  LVESV MOD A4C: 41.32 ml  LVIDd: 4.44 cm  LVIDs: 2.52 cm  LVLd A4C: 8.72 cm  LVLs A4C: 7.29 cm  LVPWd: 0.81 cm  RA Area: 13.7 cm2  RVIDd: 3.54 cm  SV MOD A4C: 100.12 ml  SV(Teich): 66.97 ml    MM  TAPSE: 1.76 cm    PW  E' Sept: 0.09 m/s  E/E' Sept: 8.61  LVOT Env.Ti: 263.37 ms  LVOT VTI: 16.22 cm  LVOT Vmax: 0.87 m/s  LVOT Vmean: 0.62 m/s  LVOT maxPG: 3.11 mmHg  LVOT meanP.7 mmHg  MV A Lavon: 0.71 m/s  MV Dec Brantley: 3.23 m/s2  MV DecT: 229.34 ms  MV E Lavon: 0.74 m/s  MV E/A Ratio: 1.05  MV PHT: 66.51 ms  MVA By PHT: 3.31 cm2  RVOT Env.Ti: 252.14 ms  RVOT VTI: 15.03 cm  RVOT Vmax: 0.91 m/s  RVOT Vmean: 0.61 m/s  RVOT maxPG: 3.32 mmHg  RVOT meanP.75 mmHg    [facility] Accredited [facility]ch[facility]    Prepared and electronically signed by    Davy Zarate D.O.  Signed 13-May-2021 14:34:01    No results found for this or any previous visit.    No results found for this or any previous visit.    No results found for this or any previous visit.                       [1]   Current Outpatient Medications:     ALPRAZolam (XANAX) 0.25 mg tablet, Take 1 tablet (0.25 mg total) by mouth daily as needed for anxiety, Disp: 30 tablet, Rfl: 0    atorvastatin (LIPITOR) 40 mg tablet, Take 1 tablet (40 mg total)  by mouth daily, Disp: 90 tablet, Rfl: 1    fluticasone (FLONASE) 50 mcg/act nasal spray, SQUIRT 1 SPRAY INTO EACH NOSTRIL DAILY AS NEEDED, Disp: 48 mL, Rfl: 2    metoprolol succinate (TOPROL-XL) 50 mg 24 hr tablet, TAKE 1 TABLET DAILY, Disp: 90 tablet, Rfl: 1    pantoprazole (PROTONIX) 40 mg tablet, Take 1 tablet (40 mg total) by mouth 2 (two) times a day, Disp: 180 tablet, Rfl: 1    Probiotic Product (Align) 4 MG CAPS, , Disp: , Rfl:     Silodosin 8 MG CAPS, Take 1 capsule by mouth daily at bedtime, Disp: 30 capsule, Rfl: 5    azelastine (ASTELIN) 0.1 % nasal spray, 1 spray into each nostril 2 (two) times a day Use in each nostril as directed, Disp: 30 mL, Rfl: 1    promethazine-dextromethorphan (PHENERGAN-DM) 6.25-15 mg/5 mL oral syrup, Take 2.5 mL by mouth 3 (three) times a day as needed for cough, Disp: 180 mL, Rfl: 1  [2]   Family History  Problem Relation Name Age of Onset    Colon cancer Mother Herlinda     Hypertension Father Father     Heart attack Father Father         NSTEMI    Stroke Father Father     Hyperlipidemia Father Father     Hypertension Sister Sheila     Squamous cell carcinoma Sister Sheila     Rashes / Skin problems Sister Sheila     Hypertension Brother Jack         Has defibulator and pace maker    Cancer Brother Jack     Basal cell carcinoma Brother Jack         Face and nose    Dementia Maternal Grandmother andres hernandez         started in her 70's    Parkinsonism Paternal Grandfather rei garcia     Diabetes Paternal Grandmother bridlara     Colon cancer Paternal Grandmother bridlara     Stroke Paternal Grandmother henri    [3]   Social History  Tobacco Use   Smoking Status Former    Current packs/day: 0.00    Average packs/day: 1 pack/day for 3.0 years (3.0 ttl pk-yrs)    Types: Cigarettes    Start date: 1970    Quit date:     Years since quittin.5   Smokeless Tobacco Never

## 2025-07-18 ENCOUNTER — OFFICE VISIT (OUTPATIENT)
Dept: FAMILY MEDICINE CLINIC | Facility: CLINIC | Age: 70
End: 2025-07-18
Payer: MEDICARE

## 2025-07-18 VITALS
DIASTOLIC BLOOD PRESSURE: 74 MMHG | OXYGEN SATURATION: 95 % | HEART RATE: 74 BPM | SYSTOLIC BLOOD PRESSURE: 114 MMHG | WEIGHT: 224 LBS | BODY MASS INDEX: 32.07 KG/M2 | HEIGHT: 70 IN | TEMPERATURE: 98.2 F

## 2025-07-18 DIAGNOSIS — E11.9 TYPE 2 DIABETES MELLITUS WITHOUT COMPLICATION, WITHOUT LONG-TERM CURRENT USE OF INSULIN (HCC): Primary | ICD-10-CM

## 2025-07-18 DIAGNOSIS — Z00.00 MEDICARE ANNUAL WELLNESS VISIT, SUBSEQUENT: ICD-10-CM

## 2025-07-18 LAB
LEFT EYE DIABETIC RETINOPATHY: ABNORMAL
LEFT EYE IMAGE QUALITY: ABNORMAL
LEFT EYE MACULAR EDEMA: ABNORMAL
LEFT EYE OTHER RETINOPATHY: ABNORMAL
RIGHT EYE DIABETIC RETINOPATHY: ABNORMAL
RIGHT EYE IMAGE QUALITY: ABNORMAL
RIGHT EYE MACULAR EDEMA: ABNORMAL
RIGHT EYE OTHER RETINOPATHY: ABNORMAL
SEVERITY (EYE EXAM): ABNORMAL

## 2025-07-18 PROCEDURE — 99213 OFFICE O/P EST LOW 20 MIN: CPT | Performed by: FAMILY MEDICINE

## 2025-07-18 PROCEDURE — G2211 COMPLEX E/M VISIT ADD ON: HCPCS | Performed by: FAMILY MEDICINE

## 2025-07-18 PROCEDURE — G0439 PPPS, SUBSEQ VISIT: HCPCS | Performed by: FAMILY MEDICINE

## 2025-07-18 NOTE — PROGRESS NOTES
Name: Luca Bowling      : 1955      MRN: [id number]  Encounter Provider: Terell Dowd MD  Encounter Date: 2025   Encounter department: [facility]  :  Assessment & Plan  Type 2 diabetes mellitus without complication, without long-term current use of insulin ([facility])    Lab Results   Component Value Date    HGBA1C 6.9 (H) 2025     Continue lifestyle modifications  Orders:  •  IRIS Diabetic eye exam    Medicare annual wellness visit, subsequent  See medicare wellness note    Follow up in 1 year          Preventive health issues were discussed with patient, and age appropriate screening tests were ordered as noted in patient's After Visit Summary. Personalized health advice and appropriate referrals for health education or preventive services given if needed, as noted in patient's After Visit Summary.    History of Present Illness     Patient is here to follow up for Type 2 DM. He does not take medications for diabetes only does diet and exercise.       Patient Care Team:  Terell Dowd MD as PCP - General (Family Medicine)  GUILLERMO Fierro MD as Endoscopist  GUILLERMO Dudley as Nurse Practitioner (Nurse Practitioner)  Kimberley Espinosa RD as Diabetes Educator (Nutrition)    Review of Systems   Constitutional:  Negative for activity change, appetite change, fatigue and fever.   HENT:  Negative for congestion and ear discharge.    Respiratory:  Negative for cough and shortness of breath.    Cardiovascular:  Negative for chest pain and palpitations.   Gastrointestinal:  Negative for diarrhea and nausea.   Musculoskeletal:  Negative for arthralgias and back pain.   Skin:  Negative for color change and rash.   Neurological:  Negative for dizziness and headaches.   Psychiatric/Behavioral:  Negative for agitation and behavioral problems.      Medical History Reviewed by provider this encounter:  Tobacco  Allergies  Meds  Problems  Med Hx  Surg Hx  Fam Hx        Annual Wellness Visit Questionnaire   Luca is here for his Subsequent Wellness visit.     Health Risk Assessment:   Patient rates overall health as good. Patient feels that their physical health rating is same. Patient is satisfied with their life. Eyesight was rated as same. Hearing was rated as same. Patient feels that their emotional and mental health rating is same. Patients states they are never, rarely angry. Patient states they are sometimes unusually tired/fatigued. Pain experienced in the last 7 days has been some. Patient's pain rating has been 3/10. Patient states that he has experienced no weight loss or gain in last 6 months.     Depression Screening:   PHQ-2 Score: 0      Fall Risk Screening:   In the past year, patient has experienced: no history of falling in past year      Home Safety:  Patient does not have trouble with stairs inside or outside of their home. Patient has working smoke alarms and has no working carbon monoxide detector. Home safety hazards include: none.     Nutrition:   Current diet is Regular and Diabetic.     Medications:   Patient is not currently taking any over-the-counter supplements. Patient is able to manage medications.     Activities of Daily Living (ADLs)/Instrumental Activities of Daily Living (IADLs):   Walk and transfer into and out of bed and chair?: Yes  Dress and groom yourself?: Yes    Bathe or shower yourself?: Yes    Feed yourself? Yes  Do your laundry/housekeeping?: Yes  Manage your money, pay your bills and track your expenses?: Yes  Make your own meals?: Yes    Do your own shopping?: Yes    Previous Hospitalizations:   Any hospitalizations or ED visits within the last 12 months?: Yes    How many hospitalizations have you had in the last year?: 1-2    Advance Care Planning:   Living will: No    Durable POA for healthcare: No    Advanced directive: No      Preventive Screenings      Cardiovascular Screening:    General: History Lipid Disorder and  Screening Current      Diabetes Screening:     General: Screening Not Indicated, History Diabetes and Screening Current      Colorectal Cancer Screening:     General: Screening Current      Prostate Cancer Screening:    General: Screening Current      Abdominal Aortic Aneurysm (AAA) Screening:    Risk factors include: age between 65-76 yo and tobacco use        Lung Cancer Screening:     General: Screening Not Indicated      Hepatitis C Screening:    General: Screening Current    Immunizations:  - Immunizations due: Zoster (Shingrix)    Screening, Brief Intervention, and Referral to Treatment (SBIRT)     Screening  Typical number of drinks in a day: 0  Typical number of drinks in a week: 2  Interpretation: Low risk drinking behavior.    AUDIT-C Screenin) How often did you have a drink containing alcohol in the past year? 2 to 4 times a month  2) How many drinks did you have on a typical day when you were drinking in the past year? 1 to 2  3) How often did you have 6 or more drinks on one occasion in the past year? never    AUDIT-C Score: 2  Interpretation: Score 0-3 (male): Negative screen for alcohol misuse    Single Item Drug Screening:  How often have you used an illegal drug (including marijuana) or a prescription medication for non-medical reasons in the past year? never    Single Item Drug Screen Score: 0  Interpretation: Negative screen for possible drug use disorder    Social Drivers of Health     Financial Resource Strain: Patient Declined (2023)    Overall Financial Resource Strain (CARDIA)    • Difficulty of Paying Living Expenses: Patient declined   Food Insecurity: No Food Insecurity (2025)    Nursing - Inadequate Food Risk Classification    • Worried About Running Out of Food in the Last Year: Never true    • Ran Out of Food in the Last Year: Never true   Transportation Needs: No Transportation Needs (2025)    PRAPARE - Transportation    • Lack of Transportation (Medical): No    •  "Lack of Transportation (Non-Medical): No   Housing Stability: Low Risk  (7/18/2025)    Housing Stability Vital Sign    • Unable to Pay for Housing in the Last Year: No    • Number of Times Moved in the Last Year: 0    • Homeless in the Last Year: No   Utilities: Not At Risk (7/18/2025)    [facility] Utilities    • Threatened with loss of utilities: No     No results found.    Objective   /74   Pulse 74   Temp 98.2 °F (36.8 °C)   Ht 5' 10\" (1.778 m)   Wt 102 kg (224 lb)   SpO2 95%   BMI 32.14 kg/m²     Physical Exam  Constitutional:       General: He is not in acute distress.     Appearance: He is well-developed. He is not diaphoretic.     Eyes:      General: No scleral icterus.     Pupils: Pupils are equal, round, and reactive to light.       Cardiovascular:      Rate and Rhythm: Normal rate and regular rhythm.      Heart sounds: Normal heart sounds. No murmur heard.  Pulmonary:      Effort: Pulmonary effort is normal. No respiratory distress.      Breath sounds: Normal breath sounds. No wheezing.   Abdominal:      General: Bowel sounds are normal. There is no distension.      Palpations: Abdomen is soft.      Tenderness: There is no abdominal tenderness.     Skin:     General: Skin is warm and dry.      Findings: No rash.     Neurological:      Mental Status: He is alert and oriented to person, place, and time.         "

## 2025-07-18 NOTE — ASSESSMENT & PLAN NOTE
Lab Results   Component Value Date    HGBA1C 6.9 (H) 07/09/2025     Continue lifestyle modifications  Orders:  •  IRIS Diabetic eye exam

## 2025-07-18 NOTE — PATIENT INSTRUCTIONS
Medicare Preventive Visit Patient Instructions  Thank you for completing your Welcome to Medicare Visit or Medicare Annual Wellness Visit today. Your next wellness visit will be due in one year (7/19/2026).  The screening/preventive services that you may require over the next 5-10 years are detailed below. Some tests may not apply to you based off risk factors and/or age. Screening tests ordered at today's visit but not completed yet may show as past due. Also, please note that scanned in results may not display below.  Preventive Screenings:  Service Recommendations Previous Testing/Comments   Colorectal Cancer Screening  Colonoscopy    Fecal Occult Blood Test (FOBT)/Fecal Immunochemical Test (FIT)  Fecal DNA/Cologuard Test  Flexible Sigmoidoscopy Age: 45-75 years old   Colonoscopy: every 10 years (May be performed more frequently if at higher risk)  OR  FOBT/FIT: every 1 year  OR  Cologuard: every 3 years  OR  Sigmoidoscopy: every 5 years  Screening may be recommended earlier than age 45 if at higher risk for colorectal cancer. Also, an individualized decision between you and your healthcare provider will decide whether screening between the ages of 76-85 would be appropriate. Colonoscopy: 09/19/2023  FOBT/FIT: Not on file  Cologuard: Not on file  Sigmoidoscopy: Not on file    Screening Current     Prostate Cancer Screening Individualized decision between patient and health care provider in men between ages of 55-69   Medicare will cover every 12 months beginning on the day after your 50th birthday PSA: 2.545 ng/mL     Screening Current     Hepatitis C Screening Once for adults born between 1945 and 1965  More frequently in patients at high risk for Hepatitis C Hep C Antibody: 05/07/2018    Screening Current   Diabetes Screening 1-2 times per year if you're at risk for diabetes or have pre-diabetes Fasting glucose: 154 mg/dL (7/9/2025)  A1C: 6.9 % (7/9/2025)  Screening Not Indicated  History Diabetes   Cholesterol  Screening Once every 5 years if you don't have a lipid disorder. May order more often based on risk factors. Lipid panel: 07/09/2025  Screening Not Indicated  History Lipid Disorder      Other Preventive Screenings Covered by Medicare:  Abdominal Aortic Aneurysm (AAA) Screening: covered once if your at risk. You're considered to be at risk if you have a family history of AAA or a male between the age of 65-75 who smoking at least 100 cigarettes in your lifetime.  Lung Cancer Screening: covers low dose CT scan once per year if you meet all of the following conditions: (1) Age 55-77; (2) No signs or symptoms of lung cancer; (3) Current smoker or have quit smoking within the last 15 years; (4) You have a tobacco smoking history of at least 20 pack years (packs per day x number of years you smoked); (5) You get a written order from a healthcare provider.  Glaucoma Screening: covered annually if you're considered high risk: (1) You have diabetes OR (2) Family history of glaucoma OR (3)  aged 50 and older OR (4)  American aged 65 and older  Osteoporosis Screening: covered every 2 years if you meet one of the following conditions: (1) Have a vertebral abnormality; (2) On glucocorticoid therapy for more than 3 months; (3) Have primary hyperparathyroidism; (4) On osteoporosis medications and need to assess response to drug therapy.  HIV Screening: covered annually if you're between the age of 15-65. Also covered annually if you are younger than 15 and older than 65 with risk factors for HIV infection. For pregnant patients, it is covered up to 3 times per pregnancy.    Immunizations:  Immunization Recommendations   Influenza Vaccine Annual influenza vaccination during flu season is recommended for all persons aged >= 6 months who do not have contraindications   Pneumococcal Vaccine   * Pneumococcal conjugate vaccine = PCV13 (Prevnar 13), PCV15 (Vaxneuvance), PCV20 (Prevnar 20)  * Pneumococcal  polysaccharide vaccine = PPSV23 (Pneumovax) Adults 19-65 yo with certain risk factors or if 65+ yo  If never received any pneumonia vaccine: recommend Prevnar 20 (PCV20)  Give PCV20 if previously received 1 dose of PCV13 or PPSV23   Hepatitis B Vaccine 3 dose series if at intermediate or high risk (ex: diabetes, end stage renal disease, liver disease)   Respiratory syncytial virus (RSV) Vaccine - COVERED BY MEDICARE PART D  * RSVPreF3 (Arexvy) CDC recommends that adults 60 years of age and older may receive a single dose of RSV vaccine using shared clinical decision-making (SCDM)   Tetanus (Td) Vaccine - COST NOT COVERED BY MEDICARE PART B Following completion of primary series, a booster dose should be given every 10 years to maintain immunity against tetanus. Td may also be given as tetanus wound prophylaxis.   Tdap Vaccine - COST NOT COVERED BY MEDICARE PART B Recommended at least once for all adults. For pregnant patients, recommended with each pregnancy.   Shingles Vaccine (Shingrix) - COST NOT COVERED BY MEDICARE PART B  2 shot series recommended in those 19 years and older who have or will have weakened immune systems or those 50 years and older     Health Maintenance Due:      Topic Date Due   • Colorectal Cancer Screening  09/17/2028   • Hepatitis C Screening  Completed     Immunizations Due:      Topic Date Due   • COVID-19 Vaccine (4 - 2024-25 season) 09/01/2024   • Influenza Vaccine (1) 09/01/2025     Advance Directives   What are advance directives?  Advance directives are legal documents that state your wishes and plans for medical care. These plans are made ahead of time in case you lose your ability to make decisions for yourself. Advance directives can apply to any medical decision, such as the treatments you want, and if you want to donate organs.   What are the types of advance directives?  There are many types of advance directives, and each state has rules about how to use them. You may choose a  combination of any of the following:  Living will:  This is a written record of the treatment you want. You can also choose which treatments you do not want, which to limit, and which to stop at a certain time. This includes surgery, medicine, IV fluid, and tube feedings.   Durable power of  for healthcare (DPAHC):  This is a written record that states who you want to make healthcare choices for you when you are unable to make them for yourself. This person, called a proxy, is usually a family member or a friend. You may choose more than 1 proxy.  Do not resuscitate (DNR) order:  A DNR order is used in case your heart stops beating or you stop breathing. It is a request not to have certain forms of treatment, such as CPR. A DNR order may be included in other types of advance directives.  Medical directive:  This covers the care that you want if you are in a coma, near death, or unable to make decisions for yourself. You can list the treatments you want for each condition. Treatment may include pain medicine, surgery, blood transfusions, dialysis, IV or tube feedings, and a ventilator (breathing machine).  Values history:  This document has questions about your views, beliefs, and how you feel and think about life. This information can help others choose the care that you would choose.  Why are advance directives important?  An advance directive helps you control your care. Although spoken wishes may be used, it is better to have your wishes written down. Spoken wishes can be misunderstood, or not followed. Treatments may be given even if you do not want them. An advance directive may make it easier for your family to make difficult choices about your care.   Weight Management   Why it is important to manage your weight:  Being overweight increases your risk of health conditions such as heart disease, high blood pressure, type 2 diabetes, and certain types of cancer. It can also increase your risk for  osteoarthritis, sleep apnea, and other respiratory problems. Aim for a slow, steady weight loss. Even a small amount of weight loss can lower your risk of health problems.  How to lose weight safely:  A safe and healthy way to lose weight is to eat fewer calories and get regular exercise. You can lose up about 1 pound a week by decreasing the number of calories you eat by 500 calories each day.   Healthy meal plan for weight management:  A healthy meal plan includes a variety of foods, contains fewer calories, and helps you stay healthy. A healthy meal plan includes the following:  Eat whole-grain foods more often.  A healthy meal plan should contain fiber. Fiber is the part of grains, fruits, and vegetables that is not broken down by your body. Whole-grain foods are healthy and provide extra fiber in your diet. Some examples of whole-grain foods are whole-wheat breads and pastas, oatmeal, brown rice, and bulgur.  Eat a variety of vegetables every day.  Include dark, leafy greens such as spinach, kale, michael greens, and mustard greens. Eat yellow and orange vegetables such as carrots, sweet potatoes, and winter squash.   Eat a variety of fruits every day.  Choose fresh or canned fruit (canned in its own juice or light syrup) instead of juice. Fruit juice has very little or no fiber.  Eat low-fat dairy foods.  Drink fat-free (skim) milk or 1% milk. Eat fat-free yogurt and low-fat cottage cheese. Try low-fat cheeses such as mozzarella and other reduced-fat cheeses.  Choose meat and other protein foods that are low in fat.  Choose beans or other legumes such as split peas or lentils. Choose fish, skinless poultry (chicken or turkey), or lean cuts of red meat (beef or pork). Before you cook meat or poultry, cut off any visible fat.   Use less fat and oil.  Try baking foods instead of frying them. Add less fat, such as margarine, sour cream, regular salad dressing and mayonnaise to foods. Eat fewer high-fat foods. Some  examples of high-fat foods include french fries, doughnuts, ice cream, and cakes.  Eat fewer sweets.  Limit foods and drinks that are high in sugar. This includes candy, cookies, regular soda, and sweetened drinks.  Exercise:  Exercise at least 30 minutes per day on most days of the week. Some examples of exercise include walking, biking, dancing, and swimming. You can also fit in more physical activity by taking the stairs instead of the elevator or parking farther away from stores. Ask your healthcare provider about the best exercise plan for you.    © Copyright [employer] 2018 Information is for End User's use only and may not be sold, redistributed or otherwise used for commercial purposes. All illustrations and images included in CareNotes® are the copyrighted property of [employer] or [employer]

## 2025-08-04 ENCOUNTER — REMOTE DEVICE CLINIC VISIT (OUTPATIENT)
Dept: CARDIOLOGY CLINIC | Facility: CLINIC | Age: 70
End: 2025-08-04
Payer: MEDICARE

## 2025-08-04 DIAGNOSIS — I48.0 PAF (PAROXYSMAL ATRIAL FIBRILLATION) (HCC): Primary | ICD-10-CM

## 2025-08-04 PROCEDURE — 93298 REM INTERROG DEV EVAL SCRMS: CPT | Performed by: INTERNAL MEDICINE

## 2025-08-17 PROBLEM — Z00.00 MEDICARE ANNUAL WELLNESS VISIT, SUBSEQUENT: Status: RESOLVED | Noted: 2023-06-27 | Resolved: 2025-08-17
